# Patient Record
Sex: FEMALE | Race: WHITE | NOT HISPANIC OR LATINO | Employment: UNEMPLOYED | URBAN - METROPOLITAN AREA
[De-identification: names, ages, dates, MRNs, and addresses within clinical notes are randomized per-mention and may not be internally consistent; named-entity substitution may affect disease eponyms.]

---

## 2017-01-09 ENCOUNTER — TELEPHONE (OUTPATIENT)
Dept: OBSTETRICS AND GYNECOLOGY | Facility: CLINIC | Age: 37
End: 2017-01-09

## 2017-01-09 NOTE — TELEPHONE ENCOUNTER
Post op laparoscopic hysterectomy 11/30/16 would like to know when she can resume intercourse. Patient scheduled for post op visit 1/18/17. Please advise.

## 2017-01-09 NOTE — TELEPHONE ENCOUNTER
----- Message from Kathy Hanna sent at 2017  2:18 PM CST -----  Contact: self  Anat Parks  MRN: [id number]  : 1980  PCP: Gracy Zimmer  Home Phone      [phone]  Work Phone      Not on file.  Mobile          [phone]      MESSAGE: Pt has a question in regards to her surgery. Pt does not wish to give me further information. Pt states that she only wants to give information to a nurse. Please call @ [phone].  Thanks!

## 2017-01-09 NOTE — TELEPHONE ENCOUNTER
We usually recommend the patient's weight a full 6 weeks.  for her this would be January 11.  Patient should be safe to resume intercourse

## 2017-02-13 ENCOUNTER — TELEPHONE (OUTPATIENT)
Dept: INTERNAL MEDICINE | Facility: CLINIC | Age: 37
End: 2017-02-13

## 2017-02-13 ENCOUNTER — OFFICE VISIT (OUTPATIENT)
Dept: FAMILY MEDICINE | Facility: CLINIC | Age: 37
End: 2017-02-13
Payer: OTHER GOVERNMENT

## 2017-02-13 ENCOUNTER — APPOINTMENT (OUTPATIENT)
Dept: RADIOLOGY | Facility: CLINIC | Age: 37
End: 2017-02-13
Attending: NURSE PRACTITIONER
Payer: OTHER GOVERNMENT

## 2017-02-13 VITALS
HEART RATE: 72 BPM | DIASTOLIC BLOOD PRESSURE: 90 MMHG | SYSTOLIC BLOOD PRESSURE: 138 MMHG | WEIGHT: 247.38 LBS | BODY MASS INDEX: 37.49 KG/M2 | HEIGHT: 68 IN | RESPIRATION RATE: 15 BRPM

## 2017-02-13 DIAGNOSIS — M54.42 ACUTE BILATERAL LOW BACK PAIN WITH BILATERAL SCIATICA: Primary | ICD-10-CM

## 2017-02-13 DIAGNOSIS — M54.41 ACUTE BILATERAL LOW BACK PAIN WITH BILATERAL SCIATICA: Primary | ICD-10-CM

## 2017-02-13 DIAGNOSIS — R82.90 ABNORMAL URINALYSIS: ICD-10-CM

## 2017-02-13 DIAGNOSIS — M54.41 ACUTE BILATERAL LOW BACK PAIN WITH BILATERAL SCIATICA: ICD-10-CM

## 2017-02-13 DIAGNOSIS — M54.42 ACUTE BILATERAL LOW BACK PAIN WITH BILATERAL SCIATICA: ICD-10-CM

## 2017-02-13 PROCEDURE — 87186 SC STD MICRODIL/AGAR DIL: CPT

## 2017-02-13 PROCEDURE — 87088 URINE BACTERIA CULTURE: CPT

## 2017-02-13 PROCEDURE — 87086 URINE CULTURE/COLONY COUNT: CPT

## 2017-02-13 PROCEDURE — 99999 PR PBB SHADOW E&M-EST. PATIENT-LVL IV: CPT | Mod: PBBFAC,,, | Performed by: NURSE PRACTITIONER

## 2017-02-13 PROCEDURE — 72100 X-RAY EXAM L-S SPINE 2/3 VWS: CPT | Mod: TC,PO

## 2017-02-13 PROCEDURE — 99214 OFFICE O/P EST MOD 30 MIN: CPT | Mod: S$PBB,,, | Performed by: NURSE PRACTITIONER

## 2017-02-13 PROCEDURE — 87077 CULTURE AEROBIC IDENTIFY: CPT

## 2017-02-13 PROCEDURE — 96372 THER/PROPH/DIAG INJ SC/IM: CPT | Mod: PBBFAC

## 2017-02-13 PROCEDURE — 81001 URINALYSIS AUTO W/SCOPE: CPT | Mod: PBBFAC | Performed by: NURSE PRACTITIONER

## 2017-02-13 PROCEDURE — 72100 X-RAY EXAM L-S SPINE 2/3 VWS: CPT | Mod: 26,,, | Performed by: RADIOLOGY

## 2017-02-13 PROCEDURE — 81000 URINALYSIS NONAUTO W/SCOPE: CPT | Mod: PBBFAC | Performed by: NURSE PRACTITIONER

## 2017-02-13 PROCEDURE — 99214 OFFICE O/P EST MOD 30 MIN: CPT | Mod: PBBFAC,25 | Performed by: NURSE PRACTITIONER

## 2017-02-13 RX ORDER — NITROFURANTOIN 25; 75 MG/1; MG/1
100 CAPSULE ORAL 2 TIMES DAILY
Qty: 20 CAPSULE | Refills: 0 | Status: SHIPPED | OUTPATIENT
Start: 2017-02-13 | End: 2017-02-23

## 2017-02-13 RX ORDER — METHYLPREDNISOLONE ACETATE 40 MG/ML
60 INJECTION, SUSPENSION INTRA-ARTICULAR; INTRALESIONAL; INTRAMUSCULAR; SOFT TISSUE
Status: COMPLETED | OUTPATIENT
Start: 2017-02-13 | End: 2017-02-13

## 2017-02-13 RX ORDER — CYCLOBENZAPRINE HCL 5 MG
5 TABLET ORAL NIGHTLY PRN
Qty: 30 TABLET | Refills: 1 | Status: SHIPPED | OUTPATIENT
Start: 2017-02-13 | End: 2017-05-01

## 2017-02-13 RX ORDER — TRAMADOL HYDROCHLORIDE AND ACETAMINOPHEN 37.5; 325 MG/1; MG/1
1 TABLET, FILM COATED ORAL EVERY 6 HOURS PRN
Qty: 60 TABLET | Refills: 0 | Status: SHIPPED | OUTPATIENT
Start: 2017-02-13 | End: 2017-03-08 | Stop reason: SDUPTHER

## 2017-02-13 RX ORDER — KETOROLAC TROMETHAMINE 30 MG/ML
60 INJECTION, SOLUTION INTRAMUSCULAR; INTRAVENOUS
Status: COMPLETED | OUTPATIENT
Start: 2017-02-13 | End: 2017-02-13

## 2017-02-13 RX ADMIN — KETOROLAC TROMETHAMINE 60 MG: 60 INJECTION, SOLUTION INTRAMUSCULAR at 05:02

## 2017-02-13 RX ADMIN — METHYLPREDNISOLONE ACETATE 60 MG: 40 INJECTION, SUSPENSION INTRA-ARTICULAR; INTRALESIONAL; INTRAMUSCULAR; SOFT TISSUE at 05:02

## 2017-02-13 NOTE — TELEPHONE ENCOUNTER
Patient reports hx of back injury. Reports that she woke this morning with numbness to feet. She is requesting an appt for today. Only availability today is with BUTCH Shannon NP @ [facility] @ 3:00 pm

## 2017-02-13 NOTE — TELEPHONE ENCOUNTER
----- Message from Jody Purcell sent at 2017  1:09 PM CST -----  Contact: self  Anat Parks  MRN: [id number]  : 1980  PCP: Gracy Zimmer  Home Phone      [phone]  Work Phone      Not on file.  Mobile          [phone]      MESSAGE:   Pt said she threw out her back. She is requesting to be seen today.    Phone: [phone]

## 2017-02-13 NOTE — PROGRESS NOTES
Subjective:       Patient ID: Anat Parks is a 36 y.o. female.    Chief Complaint: Back Pain (2 weeks ); Leg Pain (2 weeks); and Foot Pain    HPI Comments: Back pain radiating to both buttocks, legs and feet.    Back Pain   Episode onset: bck pain started about 2 years ago and has been worse over the last 2 weeks. The quality of the pain is described as burning. Radiates to: anuel buttocks, legs and foot numbness. The pain is at a severity of 6/10. The pain is worse during the night. The symptoms are aggravated by standing. Associated symptoms include leg pain.   Leg Pain      Foot Pain       Review of Systems   Constitutional: Negative.    HENT: Negative.    Eyes: Negative.    Respiratory: Negative.    Cardiovascular: Negative.    Gastrointestinal: Negative.    Genitourinary: Negative.    Musculoskeletal: Positive for back pain (as per HPI).   Skin: Negative.    Neurological: Negative.    Psychiatric/Behavioral: Negative.    All other systems reviewed and are negative.      Objective:      Physical Exam   Constitutional: She is oriented to person, place, and time. She appears well-developed and well-nourished. No distress.   HENT:   Head: Normocephalic and atraumatic.   Eyes: Pupils are equal, round, and reactive to light.   Neck: Normal range of motion. Neck supple.   Cardiovascular: Normal rate and regular rhythm.    No murmur heard.  Pulmonary/Chest: Effort normal and breath sounds normal. No respiratory distress.   Neurological: She is alert and oriented to person, place, and time.   Skin: Skin is warm and dry.   Psychiatric: She has a normal mood and affect.   Nursing note and vitals reviewed.      Assessment:       1. Acute bilateral low back pain with bilateral sciatica    2. Abnormal urinalysis        Plan:   Anat was seen today for back pain, leg pain and foot pain.    Diagnoses and all orders for this visit:    Acute bilateral low back pain with bilateral sciatica  -     POCT urinalysis, dipstick or  tablet reag  -     POCT URINE SEDIMENT EXAM  -     X-Ray Lumbar Spine Ap And Lateral; Future  -     methylPREDNISolone acetate injection 60 mg; Inject 1.5 mLs (60 mg total) into the muscle one time.  -     ketorolac injection 60 mg; Inject 2 mLs (60 mg total) into the muscle one time.  -     cyclobenzaprine (FLEXERIL) 5 MG tablet; Take 1 tablet (5 mg total) by mouth nightly as needed for Muscle spasms.  -     tramadol-acetaminophen 37.5-325 mg (ULTRACET) 37.5-325 mg Tab; Take 1 tablet by mouth every 6 (six) hours as needed for Pain.    Abnormal urinalysis  -     Urine culture  -     nitrofurantoin, macrocrystal-monohydrate, (MACROBID) 100 MG capsule; Take 1 capsule (100 mg total) by mouth 2 (two) times daily.    RTC 2 weeks for recheck

## 2017-02-13 NOTE — MR AVS SNAPSHOT
[address] [address]  Phone: [phone]  Fax: [phone]                  Anat Parks   2017 3:00 PM   Office Visit    Description:  Female : 1980   Provider:  Domonique Shannon NP   Department:  [facility]           Reason for Visit     Back Pain     Leg Pain     Foot Pain           Diagnoses this Visit        Comments    Acute bilateral low back pain with bilateral sciatica    -  Primary     Abnormal urinalysis                To Do List           Future Appointments        Provider Department Dept Phone    3/1/2017 8:15 AM Domonique Shannon NP [facility] [phone]    [phone]    2017 8:00 AM Mary Dickinson NP [facility] [phone]      Goals (5 Years of Data)     None      Follow-Up and Disposition     Return in about 2 weeks (around 2017).       These Medications        Disp Refills Start End    nitrofurantoin, macrocrystal-monohydrate, (MACROBID) 100 MG capsule 20 capsule 0 2017    Take 1 capsule (100 mg total) by mouth 2 (two) times daily. - Oral    Pharmacy: [facility] [address] [place] [address] Ph #: [phone]       cyclobenzaprine (FLEXERIL) 5 MG tablet 30 tablet 1 2017     Take 1 tablet (5 mg total) by mouth nightly as needed for Muscle spasms. - Oral    Pharmacy: [facility] [address] [place] [address] Ph #: [phone]       tramadol-acetaminophen 37.5-325 mg (ULTRACET) 37.5-325 mg Tab 60 tablet 0 2017     Take 1 tablet by mouth every 6 (six) hours as needed for Pain. - Oral    Pharmacy: [facility] [address] [place] [address] Ph #: [phone]         [facility]     [facility] -  Assistance  Registered nurses in the [facility] provide clinical advisement, health education,  appointment booking, and other advisory services.  Call for this free service at [phone].             Medications           Message regarding Medications     Verify the changes and/or additions to your medication regime listed below are the same as discussed with your clinician today.  If any of these changes or additions are incorrect, please notify your healthcare provider.        START taking these NEW medications        Refills    nitrofurantoin, macrocrystal-monohydrate, (MACROBID) 100 MG capsule 0    Sig: Take 1 capsule (100 mg total) by mouth 2 (two) times daily.    Class: Normal    Route: Oral    cyclobenzaprine (FLEXERIL) 5 MG tablet 1    Sig: Take 1 tablet (5 mg total) by mouth nightly as needed for Muscle spasms.    Class: Normal    Route: Oral    tramadol-acetaminophen 37.5-325 mg (ULTRACET) 37.5-325 mg Tab 0    Sig: Take 1 tablet by mouth every 6 (six) hours as needed for Pain.    Class: Normal    Route: Oral      These medications were administered today        Dose Freq    methylPREDNISolone acetate injection 60 mg 60 mg Clinic/HOD 1 time    Sig: Inject 1.5 mLs (60 mg total) into the muscle one time.    Class: Normal    Route: Intramuscular    ketorolac injection 60 mg 60 mg Clinic/HOD 1 time    Sig: Inject 2 mLs (60 mg total) into the muscle one time.    Class: Normal    Route: Intramuscular      STOP taking these medications     hydrocodone-acetaminophen 5-325mg (NORCO) 5-325 mg per tablet Take 1 tablet by mouth every 4 (four) hours as needed for Pain.           Verify that the below list of medications is an accurate representation of the medications you are currently taking.  If none reported, the list may be blank. If incorrect, please contact your healthcare provider. Carry this list with you in case of emergency.           Current Medications     buPROPion (WELLBUTRIN XL) 300 MG 24 hr tablet Take 1 tablet (300 mg total) by mouth once daily.    CAFFEINE ORAL Take 1 tablet by mouth daily  "as needed.    cyclobenzaprine (FLEXERIL) 5 MG tablet Take 1 tablet (5 mg total) by mouth nightly as needed for Muscle spasms.    diphenhydrAMINE (BENADRYL) 25 mg capsule Take 25 mg by mouth every 6 (six) hours as needed for Itching.    eszopiclone 3 mg Tab Take 1 tablet (3 mg total) by mouth nightly as needed.    ibuprofen (ADVIL,MOTRIN) 800 MG tablet Take 1 tablet (800 mg total) by mouth every 8 (eight) hours as needed for Pain (cramping).    nitrofurantoin, macrocrystal-monohydrate, (MACROBID) 100 MG capsule Take 1 capsule (100 mg total) by mouth 2 (two) times daily.    omeprazole (PRILOSEC) 10 MG capsule Take 1 capsule (10 mg total) by mouth once daily.    promethazine (PHENERGAN) 12.5 MG Tab Take 1 tablet (12.5 mg total) by mouth every 6 (six) hours as needed (nausea/vomiting).    tramadol-acetaminophen 37.5-325 mg (ULTRACET) 37.5-325 mg Tab Take 1 tablet by mouth every 6 (six) hours as needed for Pain.           Clinical Reference Information           Your Vitals Were     BP Pulse Resp Height Weight Last Period    138/90 (BP Location: Left arm, Patient Position: Sitting, BP Method: Manual) 72 15 5' 8" (1.727 m) 112.2 kg (247 lb 5.7 oz) 11/16/2016 (Exact Date)    BMI                37.61 kg/m2          Blood Pressure          Most Recent Value    BP  (!)  138/90      Allergies as of 2/13/2017     No Known Allergies      Immunizations Administered on Date of Encounter - 2/13/2017     None      Orders Placed During Today's Visit      Normal Orders This Visit    POCT urinalysis, dipstick or tablet reag     POCT URINE SEDIMENT EXAM     Urine culture     Future Labs/Procedures Expected by Expires    X-Ray Lumbar Spine Ap And Lateral  2/13/2017 2/13/2018      Language Assistance Services     ATTENTION: Language assistance services are available, free of charge. Please call [phone].      ATENCIÓN: Si habla español, tiene a morocho disposición servicios gratuitos de asistencia lingüística. Llame al " [phone].     YOUSIF Ý: N?u b?n nói Ti?ng Vi?t, có các d?ch v? h? tr? ngôn ng? mi?n phí dành cho b?n. G?i s? [phone].         [facility] complies with applicable Federal civil rights laws and does not discriminate on the basis of race, color, national origin, age, disability, or sex.

## 2017-02-14 LAB
BACTERIA SPEC CULT: ABNORMAL
BILIRUB SERPL-MCNC: NORMAL MG/DL
BLOOD URINE, POC: NORMAL
CASTS: ABNORMAL
COLOR, POC UA: NORMAL
CRYSTALS: ABNORMAL
GLUCOSE UR QL STRIP: NORMAL
KETONES UR QL STRIP: NORMAL
LEUKOCYTE ESTERASE URINE, POC: POSITIVE
NITRITE, POC UA: NORMAL
PH, POC UA: 5
PROTEIN, POC: NORMAL
RBC CELLS COUNTED: ABNORMAL
SPECIFIC GRAVITY, POC UA: 1.01
UROBILINOGEN, POC UA: NORMAL
WHITE BLOOD CELLS: ABNORMAL

## 2017-02-16 ENCOUNTER — PATIENT MESSAGE (OUTPATIENT)
Dept: FAMILY MEDICINE | Facility: CLINIC | Age: 37
End: 2017-02-16

## 2017-02-16 DIAGNOSIS — N39.0 URINARY TRACT INFECTION WITHOUT HEMATURIA, SITE UNSPECIFIED: Primary | ICD-10-CM

## 2017-02-16 LAB — BACTERIA UR CULT: NORMAL

## 2017-02-16 RX ORDER — SULFAMETHOXAZOLE AND TRIMETHOPRIM 800; 160 MG/1; MG/1
1 TABLET ORAL 2 TIMES DAILY
Qty: 20 TABLET | Refills: 0 | Status: SHIPPED | OUTPATIENT
Start: 2017-02-16 | End: 2017-02-26

## 2017-03-08 ENCOUNTER — OFFICE VISIT (OUTPATIENT)
Dept: INTERNAL MEDICINE | Facility: CLINIC | Age: 37
End: 2017-03-08
Payer: OTHER GOVERNMENT

## 2017-03-08 VITALS
DIASTOLIC BLOOD PRESSURE: 82 MMHG | HEART RATE: 96 BPM | SYSTOLIC BLOOD PRESSURE: 122 MMHG | WEIGHT: 239.44 LBS | BODY MASS INDEX: 36.29 KG/M2 | HEIGHT: 68 IN

## 2017-03-08 DIAGNOSIS — M54.42 ACUTE BILATERAL LOW BACK PAIN WITH BILATERAL SCIATICA: ICD-10-CM

## 2017-03-08 DIAGNOSIS — M51.36 BULGE OF LUMBAR DISC WITHOUT MYELOPATHY: Primary | ICD-10-CM

## 2017-03-08 DIAGNOSIS — F51.04 PSYCHOPHYSIOLOGICAL INSOMNIA: ICD-10-CM

## 2017-03-08 DIAGNOSIS — G89.29 CHRONIC MIDLINE LOW BACK PAIN WITHOUT SCIATICA: ICD-10-CM

## 2017-03-08 DIAGNOSIS — M54.50 CHRONIC MIDLINE LOW BACK PAIN WITHOUT SCIATICA: ICD-10-CM

## 2017-03-08 DIAGNOSIS — M54.41 ACUTE BILATERAL LOW BACK PAIN WITH BILATERAL SCIATICA: ICD-10-CM

## 2017-03-08 PROCEDURE — 99999 PR PBB SHADOW E&M-EST. PATIENT-LVL III: CPT | Mod: PBBFAC,,, | Performed by: NURSE PRACTITIONER

## 2017-03-08 PROCEDURE — 99213 OFFICE O/P EST LOW 20 MIN: CPT | Mod: PBBFAC | Performed by: NURSE PRACTITIONER

## 2017-03-08 PROCEDURE — 96372 THER/PROPH/DIAG INJ SC/IM: CPT | Mod: PBBFAC

## 2017-03-08 PROCEDURE — 99214 OFFICE O/P EST MOD 30 MIN: CPT | Mod: S$PBB,,, | Performed by: NURSE PRACTITIONER

## 2017-03-08 RX ORDER — ESZOPICLONE 3 MG/1
3 TABLET, FILM COATED ORAL NIGHTLY PRN
Qty: 90 TABLET | Refills: 1 | Status: SHIPPED | OUTPATIENT
Start: 2017-03-08 | End: 2018-02-07 | Stop reason: SDUPTHER

## 2017-03-08 RX ORDER — METHYLPREDNISOLONE ACETATE 80 MG/ML
80 INJECTION, SUSPENSION INTRA-ARTICULAR; INTRALESIONAL; INTRAMUSCULAR; SOFT TISSUE
Status: COMPLETED | OUTPATIENT
Start: 2017-03-08 | End: 2017-03-08

## 2017-03-08 RX ORDER — DICYCLOMINE HYDROCHLORIDE 20 MG/1
20 TABLET ORAL EVERY 6 HOURS
COMMUNITY
End: 2017-03-28 | Stop reason: SDUPTHER

## 2017-03-08 RX ORDER — TRAMADOL HYDROCHLORIDE AND ACETAMINOPHEN 37.5; 325 MG/1; MG/1
1 TABLET, FILM COATED ORAL EVERY 6 HOURS PRN
Qty: 60 TABLET | Refills: 0 | Status: SHIPPED | OUTPATIENT
Start: 2017-03-08 | End: 2017-05-01

## 2017-03-08 RX ADMIN — METHYLPREDNISOLONE ACETATE 80 MG: 80 INJECTION, SUSPENSION INTRA-ARTICULAR; INTRALESIONAL; INTRAMUSCULAR; SOFT TISSUE at 04:03

## 2017-03-08 NOTE — PROGRESS NOTES
Subjective:       Patient ID: Anat Parks is a 37 y.o. female.    Chief Complaint: Back Pain    HPI: Pt presents to clinic today new to me but known to Ludmila with c/o back pain. She reports that she saw Ludmila at the end of Feb and was imaged. Her x ray was normal. She reports that she has1 week off and still just not better. Usin TENS unit and heat with relief but pain does not remit. She has been doing PT stretching with no resolve. She has tried tramadol. Did not take flexeril due to her lunesta use for sleep and was afraid to take it all together.  She did have a CT abd and it showed Age-appropriate degenerative changes affect the skeleton.  There is a large broad-based posterior disk bulge at L4-5 that results in mild central canal stenosis or neural foraminal narrowing. 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 Negative for chills and fever.   Respiratory: Negative for cough, chest tightness and shortness of breath.    Cardiovascular: Negative for chest pain, palpitations and leg swelling.   Gastrointestinal: Negative for abdominal pain, constipation, diarrhea, nausea and vomiting.   Genitourinary: Negative for difficulty urinating, flank pain, frequency, hematuria and urgency.   Musculoskeletal: Positive for back pain.        Pain in lower back   Skin: Negative for rash and wound.   Neurological: Negative for dizziness, weakness, numbness and headaches.       Objective:      Physical Exam   Constitutional: She is oriented to person, place, and time. She appears well-developed and well-nourished.   HENT:   Head: Normocephalic and atraumatic.   Eyes: Pupils are equal, round, and reactive to light.   Neck: Normal range of motion. Neck supple.   Cardiovascular: Normal rate, regular rhythm, normal heart sounds and intact distal pulses.    No murmur  heard.  Pulmonary/Chest: Effort normal and breath sounds normal. No respiratory distress. She has no wheezes. She has no rales.   Abdominal: Soft. Bowel sounds are normal.   Musculoskeletal: She exhibits tenderness.   Lower back pain  Turning right and left with feet posted causes pain to flank areas. With cervical compression she c/o pain in sacral area   Neurological: She is alert and oriented to person, place, and time. She has normal reflexes.   Skin: Skin is warm and dry.   Psychiatric: She has a normal mood and affect.   Nursing note and vitals reviewed.      Assessment:       1. Bulge of lumbar disc without myelopathy    2. Chronic midline low back pain without sciatica    3. Acute bilateral low back pain with bilateral sciatica    4. Psychophysiological insomnia        Plan:   Anat was seen today for back pain.    Diagnoses and all orders for this visit:    Bulge of lumbar disc without myelopathy  -     MRI Lumbar Spine Without Contrast; Future    Chronic midline low back pain without sciatica  -     MRI Lumbar Spine Without Contrast; Future  -     methylPREDNISolone acetate injection 80 mg; Inject 1 mL (80 mg total) into the muscle one time.    Acute bilateral low back pain with bilateral sciatica  -     tramadol-acetaminophen 37.5-325 mg (ULTRACET) 37.5-325 mg Tab; Take 1 tablet by mouth every 6 (six) hours as needed for Pain.    Psychophysiological insomnia  -     eszopiclone 3 mg Tab; Take 1 tablet (3 mg total) by mouth nightly as needed.

## 2017-03-13 ENCOUNTER — HOSPITAL ENCOUNTER (OUTPATIENT)
Dept: RADIOLOGY | Facility: HOSPITAL | Age: 37
Discharge: HOME | End: 2017-03-13
Attending: NURSE PRACTITIONER
Payer: OTHER GOVERNMENT

## 2017-03-13 DIAGNOSIS — M51.36 BULGE OF LUMBAR DISC WITHOUT MYELOPATHY: ICD-10-CM

## 2017-03-13 DIAGNOSIS — M54.50 CHRONIC MIDLINE LOW BACK PAIN WITHOUT SCIATICA: ICD-10-CM

## 2017-03-13 DIAGNOSIS — G89.29 CHRONIC MIDLINE LOW BACK PAIN WITHOUT SCIATICA: ICD-10-CM

## 2017-03-13 PROCEDURE — 72148 MRI LUMBAR SPINE W/O DYE: CPT | Mod: TC

## 2017-03-13 PROCEDURE — 72148 MRI LUMBAR SPINE W/O DYE: CPT | Mod: 26,,, | Performed by: RADIOLOGY

## 2017-03-14 DIAGNOSIS — G89.29 CHRONIC MIDLINE LOW BACK PAIN WITHOUT SCIATICA: Primary | ICD-10-CM

## 2017-03-14 DIAGNOSIS — M54.50 CHRONIC MIDLINE LOW BACK PAIN WITHOUT SCIATICA: Primary | ICD-10-CM

## 2017-03-15 ENCOUNTER — TELEPHONE (OUTPATIENT)
Dept: FAMILY MEDICINE | Facility: CLINIC | Age: 37
End: 2017-03-15

## 2017-03-15 DIAGNOSIS — M54.50 ACUTE MIDLINE LOW BACK PAIN WITHOUT SCIATICA: Primary | ICD-10-CM

## 2017-03-21 ENCOUNTER — TELEPHONE (OUTPATIENT)
Dept: INTERNAL MEDICINE | Facility: CLINIC | Age: 37
End: 2017-03-21

## 2017-03-21 DIAGNOSIS — M54.9 BACK PAIN, UNSPECIFIED BACK LOCATION, UNSPECIFIED BACK PAIN LATERALITY, UNSPECIFIED CHRONICITY: Primary | ICD-10-CM

## 2017-03-21 NOTE — TELEPHONE ENCOUNTER
Can you please place a referral to Dr. Reji Arreguin  Physical medicine & rehabilitation - pain medicine   [address], [place] [address]   [phone]    Neurosurgery reviewed her case and determined that surgery is not indicated at this time. They referred her out to Dr Arreguin. She is scheduled for 3/28/17.

## 2017-03-24 ENCOUNTER — TELEPHONE (OUTPATIENT)
Dept: INTERNAL MEDICINE | Facility: CLINIC | Age: 37
End: 2017-03-24

## 2017-03-24 NOTE — TELEPHONE ENCOUNTER
----- Message from Jody Purcell sent at 3/24/2017  8:48 AM CDT -----  Contact: Karen / Dr. Arreguin  Anat Parks  MRN: [id number]  : 1980  PCP: Gracy Zimmer  Home Phone      [phone]  Work Phone      Not on file.  Mobile          [phone]      MESSAGE:   Calling because pt was referred to see Dr. Arreguin. She said that they have called multiple times to get this taken care of. Pt has  prime insurance which requires referrals to be submitted electronically. The pt is scheduled to go in on Tuesday. They need to have this done before then.    Phone: [phone]  Fax: [phone]

## 2017-03-24 NOTE — TELEPHONE ENCOUNTER
----- Message from Jody Purcell sent at 3/24/2017  8:48 AM CDT -----  Contact: Karen / Dr. Arreguin  Anat Parks  MRN: [id number]  : 1980  PCP: Gracy Zimmer  Home Phone      [phone]  Work Phone      Not on file.  Mobile          [phone]      MESSAGE:   Calling because pt was referred to see Dr. Arreguin. She said that they have called multiple times to get this taken care of. Pt has  prime insurance which requires referrals to be submitted electronically. The pt is scheduled to go in on Tuesday. They need to have this done before then.    Phone: [phone]  Fax: [phone]

## 2017-03-24 NOTE — TELEPHONE ENCOUNTER
I have requested assistance from Megan in auth dept to obtain this authorization. Will contact Dr. Arreguin's office once complete.

## 2017-03-28 DIAGNOSIS — K58.2 IRRITABLE BOWEL SYNDROME WITH BOTH CONSTIPATION AND DIARRHEA: ICD-10-CM

## 2017-03-28 RX ORDER — DICYCLOMINE HYDROCHLORIDE 20 MG/1
TABLET ORAL
Qty: 360 TABLET | Refills: 0 | Status: SHIPPED | OUTPATIENT
Start: 2017-03-28 | End: 2017-06-26 | Stop reason: SDUPTHER

## 2017-03-31 DIAGNOSIS — K29.70 GASTRITIS: ICD-10-CM

## 2017-03-31 RX ORDER — OMEPRAZOLE 10 MG/1
CAPSULE, DELAYED RELEASE ORAL
Qty: 90 CAPSULE | Refills: 0 | Status: SHIPPED | OUTPATIENT
Start: 2017-03-31 | End: 2017-06-29 | Stop reason: SDUPTHER

## 2017-04-04 ENCOUNTER — TELEPHONE (OUTPATIENT)
Dept: INTERNAL MEDICINE | Facility: CLINIC | Age: 37
End: 2017-04-04

## 2017-04-04 DIAGNOSIS — G89.29 CHRONIC LOW BACK PAIN, UNSPECIFIED BACK PAIN LATERALITY, WITH SCIATICA PRESENCE UNSPECIFIED: Primary | ICD-10-CM

## 2017-04-04 DIAGNOSIS — M54.5 CHRONIC LOW BACK PAIN, UNSPECIFIED BACK PAIN LATERALITY, WITH SCIATICA PRESENCE UNSPECIFIED: Primary | ICD-10-CM

## 2017-04-04 NOTE — TELEPHONE ENCOUNTER
----- Message from Norma Moon sent at 2017 11:25 AM CDT -----  Contact: self  Anat Parks  MRN: [id number]  : 1980  PCP: Gracy Zimmer  Home Phone      [phone]  Work Phone      Not on file.  Mobile          [phone]      MESSAGE: need referral to different pain dr want to see dr osman in [facility]-----[phone]  Dr elizondo can not sent scrip to ins co

## 2017-04-19 ENCOUNTER — TELEPHONE (OUTPATIENT)
Dept: INTERNAL MEDICINE | Facility: CLINIC | Age: 37
End: 2017-04-19

## 2017-04-19 NOTE — TELEPHONE ENCOUNTER
----- Message from Norma Moon sent at 2017 11:59 AM CDT -----  Contact: self  Anat Parks  MRN: [id number]  : 1980  PCP: Gracy Zimmer  Home Phone      [phone]  Work Phone      Not on file.  [employer]          [phone]      MESSAGE: checking on referral on pain management-------[phone]

## 2017-04-27 ENCOUNTER — PATIENT MESSAGE (OUTPATIENT)
Dept: INTERNAL MEDICINE | Facility: CLINIC | Age: 37
End: 2017-04-27

## 2017-04-27 ENCOUNTER — TELEPHONE (OUTPATIENT)
Dept: FAMILY MEDICINE | Facility: CLINIC | Age: 37
End: 2017-04-27

## 2017-04-27 ENCOUNTER — TELEPHONE (OUTPATIENT)
Dept: INTERNAL MEDICINE | Facility: CLINIC | Age: 37
End: 2017-04-27

## 2017-04-27 DIAGNOSIS — Z00.00 PREVENTATIVE HEALTH CARE: Primary | ICD-10-CM

## 2017-04-27 NOTE — TELEPHONE ENCOUNTER
Spoke with pt about referral spoke with Pao Montoya for approval authorization pending will check again latter

## 2017-04-27 NOTE — TELEPHONE ENCOUNTER
----- Message from Jody Purcell sent at 2017  8:12 AM CDT -----  Contact: self  Anat Parks  MRN: [id number]  : 1980  PCP: Gracy Zimmer  Home Phone      [phone]  Work Phone      Not on file.  Mobile          [phone]      MESSAGE:   Pt would like to speak with the nurse regarding a referral for pain management. She said that she has been waiting for a referral for over a month now.    Phone: [phone]

## 2017-04-27 NOTE — TELEPHONE ENCOUNTER
----- Message from [facility] sent at 2017  8:18 AM CDT -----  Contact: self  Anat Parks  MRN: [id number]  : 1980  PCP: Gracy Zimmer  Home Phone      [phone]  Work Phone      Not on file.  Mobile          [phone]      MESSAGE: requesting to speak with cyndi regarding her  referral, says she normally sees codi and tess but she has delt with cyndi before with  referrals and knows he knows how to do them correctly. Please call to discuss.    Phone: [phone]

## 2017-04-28 ENCOUNTER — LAB VISIT (OUTPATIENT)
Dept: LAB | Facility: HOSPITAL | Age: 37
End: 2017-04-28
Attending: INTERNAL MEDICINE
Payer: OTHER GOVERNMENT

## 2017-04-28 DIAGNOSIS — K58.9 IRRITABLE BOWEL SYNDROME WITHOUT DIARRHEA: ICD-10-CM

## 2017-04-28 DIAGNOSIS — Z00.00 WELLNESS EXAMINATION: ICD-10-CM

## 2017-04-28 DIAGNOSIS — Z80.3 FH: BREAST CANCER: ICD-10-CM

## 2017-04-28 DIAGNOSIS — K29.70 GASTRITIS: ICD-10-CM

## 2017-04-28 LAB
ALBUMIN SERPL BCP-MCNC: 3.6 G/DL
ALP SERPL-CCNC: 56 U/L
ALT SERPL W/O P-5'-P-CCNC: 27 U/L
ANION GAP SERPL CALC-SCNC: 8 MMOL/L
AST SERPL-CCNC: 16 U/L
BASOPHILS # BLD AUTO: 0.02 K/UL
BASOPHILS NFR BLD: 0.3 %
BILIRUB SERPL-MCNC: 0.4 MG/DL
BUN SERPL-MCNC: 14 MG/DL
CALCIUM SERPL-MCNC: 9 MG/DL
CHLORIDE SERPL-SCNC: 105 MMOL/L
CHOLEST/HDLC SERPL: 3.2 {RATIO}
CO2 SERPL-SCNC: 26 MMOL/L
CREAT SERPL-MCNC: 0.8 MG/DL
DIFFERENTIAL METHOD: ABNORMAL
EOSINOPHIL # BLD AUTO: 0.1 K/UL
EOSINOPHIL NFR BLD: 1.2 %
ERYTHROCYTE [DISTWIDTH] IN BLOOD BY AUTOMATED COUNT: 15 %
EST. GFR  (AFRICAN AMERICAN): >60 ML/MIN/1.73 M^2
EST. GFR  (NON AFRICAN AMERICAN): >60 ML/MIN/1.73 M^2
GLUCOSE SERPL-MCNC: 92 MG/DL
HCT VFR BLD AUTO: 38 %
HDL/CHOLESTEROL RATIO: 31.1 %
HDLC SERPL-MCNC: 161 MG/DL
HDLC SERPL-MCNC: 50 MG/DL
HGB BLD-MCNC: 12.3 G/DL
LDLC SERPL CALC-MCNC: 90.2 MG/DL
LYMPHOCYTES # BLD AUTO: 2.4 K/UL
LYMPHOCYTES NFR BLD: 33.2 %
MCH RBC QN AUTO: 26.5 PG
MCHC RBC AUTO-ENTMCNC: 32.4 %
MCV RBC AUTO: 82 FL
MONOCYTES # BLD AUTO: 0.6 K/UL
MONOCYTES NFR BLD: 7.8 %
NEUTROPHILS # BLD AUTO: 4.2 K/UL
NEUTROPHILS NFR BLD: 57.5 %
NONHDLC SERPL-MCNC: 111 MG/DL
PLATELET # BLD AUTO: 279 K/UL
PMV BLD AUTO: 10 FL
POTASSIUM SERPL-SCNC: 4.3 MMOL/L
PROT SERPL-MCNC: 6.8 G/DL
RBC # BLD AUTO: 4.65 M/UL
SODIUM SERPL-SCNC: 139 MMOL/L
TRIGL SERPL-MCNC: 104 MG/DL
TSH SERPL DL<=0.005 MIU/L-ACNC: 2.87 UIU/ML
WBC # BLD AUTO: 7.33 K/UL

## 2017-04-28 PROCEDURE — 36415 COLL VENOUS BLD VENIPUNCTURE: CPT

## 2017-04-28 PROCEDURE — 80061 LIPID PANEL: CPT

## 2017-04-28 PROCEDURE — 80053 COMPREHEN METABOLIC PANEL: CPT

## 2017-04-28 PROCEDURE — 84443 ASSAY THYROID STIM HORMONE: CPT

## 2017-04-28 PROCEDURE — 85025 COMPLETE CBC W/AUTO DIFF WBC: CPT

## 2017-04-28 NOTE — TELEPHONE ENCOUNTER
i see that juma emailed her saying the approval for Dr. Quintero ([facility]) was done.

## 2017-05-01 ENCOUNTER — OFFICE VISIT (OUTPATIENT)
Dept: INTERNAL MEDICINE | Facility: CLINIC | Age: 37
End: 2017-05-01
Payer: OTHER GOVERNMENT

## 2017-05-01 VITALS
HEART RATE: 80 BPM | WEIGHT: 247.13 LBS | RESPIRATION RATE: 18 BRPM | SYSTOLIC BLOOD PRESSURE: 110 MMHG | DIASTOLIC BLOOD PRESSURE: 80 MMHG | HEIGHT: 68 IN | BODY MASS INDEX: 37.46 KG/M2

## 2017-05-01 DIAGNOSIS — R41.840 INATTENTION: ICD-10-CM

## 2017-05-01 DIAGNOSIS — Z13.29 SCREENING FOR THYROID DISORDER: ICD-10-CM

## 2017-05-01 DIAGNOSIS — Z13.6 SCREENING FOR CARDIOVASCULAR CONDITION: ICD-10-CM

## 2017-05-01 DIAGNOSIS — Z09 FOLLOW UP: Primary | ICD-10-CM

## 2017-05-01 DIAGNOSIS — K58.1 IRRITABLE BOWEL SYNDROME WITH CONSTIPATION: ICD-10-CM

## 2017-05-01 PROCEDURE — 99214 OFFICE O/P EST MOD 30 MIN: CPT | Mod: S$PBB,,, | Performed by: NURSE PRACTITIONER

## 2017-05-01 PROCEDURE — 99214 OFFICE O/P EST MOD 30 MIN: CPT | Mod: PBBFAC | Performed by: NURSE PRACTITIONER

## 2017-05-01 PROCEDURE — 99999 PR PBB SHADOW E&M-EST. PATIENT-LVL IV: CPT | Mod: PBBFAC,,, | Performed by: NURSE PRACTITIONER

## 2017-05-01 RX ORDER — MELOXICAM 15 MG/1
15 TABLET ORAL DAILY
COMMUNITY
Start: 2017-04-19 | End: 2017-08-09

## 2017-05-01 RX ORDER — LUBIPROSTONE 8 UG/1
8 CAPSULE ORAL 2 TIMES DAILY WITH MEALS
Qty: 60 CAPSULE | Refills: 2 | Status: SHIPPED | OUTPATIENT
Start: 2017-05-01 | End: 2017-05-03 | Stop reason: SDUPTHER

## 2017-05-01 RX ORDER — METHOCARBAMOL 750 MG/1
750 TABLET, FILM COATED ORAL
COMMUNITY
Start: 2017-04-19 | End: 2017-08-09 | Stop reason: SDUPTHER

## 2017-05-01 NOTE — MR AVS SNAPSHOT
[facility]  [address] [facility] [address]  Phone: [phone]  Fax: [phone]                  Anat Parks   2017 7:30 AM   Office Visit    Description:  Female : 1980   Provider:  Mary Dickinson NP   Department:  [facility]           Reason for Visit     6 month checkup     Irritable Bowel Syndrome           Diagnoses this Visit        Comments    Follow up    -  Primary     Irritable bowel syndrome with constipation         Inattention         Screening for cardiovascular condition         Screening for thyroid disorder                To Do List           Future Appointments        Provider Department Dept Phone    2017 8:30 AM Devorah Womack MD [facility] [phone]    2017 8:00 AM LAB, [facility] [phone]    2017 8:00 AM Mary Dickinson NP [facility] [phone]      Goals (5 Years of Data)     None      Follow-Up and Disposition     Return in about 6 months (around 2017) for schedule with Dr. oWmack for ADD eval and Dr. Diaz for c-scope/ibs.       These Medications        Disp Refills Start End    lubiprostone (AMITIZA) 8 MCG Cap 60 capsule 2 2017     Take 1 capsule (8 mcg total) by mouth 2 (two) times daily with meals. - Oral    Pharmacy: [facility] [address] [place] [address] #: [phone]         [facility]     [facility] Care [facility] -  Assistance  Unless otherwise directed by your provider, please contact [facility], our nurse care line that is available for  assistance.     Registered nurses in the [facility] provide: appointment scheduling, clinical advisement, health education, and other advisory services.  Call: [phone] (toll free)               Medications           Message regarding Medications     Verify the changes and/or additions to your  medication regime listed below are the same as discussed with your clinician today.  If any of these changes or additions are incorrect, please notify your healthcare provider.        START taking these NEW medications        Refills    lubiprostone (AMITIZA) 8 MCG Cap 2    Sig: Take 1 capsule (8 mcg total) by mouth 2 (two) times daily with meals.    Class: Normal    Route: Oral      STOP taking these medications     buPROPion (WELLBUTRIN XL) 300 MG 24 hr tablet Take 1 tablet (300 mg total) by mouth once daily.    CAFFEINE ORAL Take 1 tablet by mouth daily as needed.    cyclobenzaprine (FLEXERIL) 5 MG tablet Take 1 tablet (5 mg total) by mouth nightly as needed for Muscle spasms.    promethazine (PHENERGAN) 12.5 MG Tab Take 1 tablet (12.5 mg total) by mouth every 6 (six) hours as needed (nausea/vomiting).    tramadol-acetaminophen 37.5-325 mg (ULTRACET) 37.5-325 mg Tab Take 1 tablet by mouth every 6 (six) hours as needed for Pain.           Verify that the below list of medications is an accurate representation of the medications you are currently taking.  If none reported, the list may be blank. If incorrect, please contact your healthcare provider. Carry this list with you in case of emergency.           Current Medications     dicyclomine (BENTYL) 20 mg tablet TAKE 1 TABLET FOUR TIMES A DAY BEFORE MEALS AND NIGHTLY    diphenhydrAMINE (BENADRYL) 25 mg capsule Take 25 mg by mouth every 6 (six) hours as needed for Itching.    eszopiclone 3 mg Tab Take 1 tablet (3 mg total) by mouth nightly as needed.    ibuprofen (ADVIL,MOTRIN) 800 MG tablet Take 1 tablet (800 mg total) by mouth every 8 (eight) hours as needed for Pain (cramping).    meloxicam (MOBIC) 15 MG tablet Take 15 mg by mouth once daily.    methocarbamol (ROBAXIN) 750 MG Tab Take 750 mg by mouth 3 (three) times daily before meals.    omeprazole (PRILOSEC) 10 MG capsule TAKE 1 CAPSULE DAILY    lubiprostone (AMITIZA) 8 MCG Cap Take 1 capsule (8 mcg total) by  "mouth 2 (two) times daily with meals.           Clinical Reference Information           Your Vitals Were     BP Pulse Resp Height Weight Last Period    110/80 (BP Location: Left arm, Patient Position: Sitting, BP Method: Manual) 80 18 5' 8" (1.727 m) 112.1 kg (247 lb 2.2 oz) 11/16/2016 (Exact Date)    BMI                37.58 kg/m2          Blood Pressure          Most Recent Value    BP  110/80      Allergies as of 5/1/2017     No Known Allergies      Immunizations Administered on Date of Encounter - 5/1/2017     None      Orders Placed During Today's Visit      Normal Orders This Visit    Ambulatory referral to Gastroenterology     Ambulatory Referral to Internal Medicine     Future Labs/Procedures Expected by Expires    CBC auto differential  10/28/2017 (Approximate) 5/1/2018    Comprehensive metabolic panel  10/28/2017 (Approximate) 5/1/2018      Language Assistance Services     ATTENTION: Language assistance services are available, free of charge. Please call [phone].      ATENCIÓN: Si habla [place], tiene a morocho disposición servicios gratuitos de asistencia lingüística. Llame al [phone].     YOUSIF Ý: N?u b?n nói Ti?ng Vi?t, có các d?ch v? h? tr? ngôn ng? mi?n phí dành cho b?n. G?i s? [phone].         [facility] complies with applicable Federal civil rights laws and does not discriminate on the basis of race, color, national origin, age, disability, or sex.        "

## 2017-05-01 NOTE — PROGRESS NOTES
"Subjective:       Patient ID: Anat Parks is a 37 y.o. female.    Chief Complaint: 6 month checkup and Irritable Bowel Syndrome (flaring up)    HPI: pt known to me presents for 6 month check up. Reports having trouble with IBS. Reports taking bentyl with no relief. Reports she has been suffering with this x 15 years. Has had c-scope about 15 years ago. Reports she has "dagger-like" pain to right lower/mid side, relieved with defecation and then sometimes 2 hours later, needing to go again. Does not have a GB.     Reports she feels "squirrly". Reports her MIL told her she thinks she has ADHD. Reports she cannot complete a task. Also reports she recently invoiced the wrong person. Afraid she will loose her job. This started in January.     Lab Results   Component Value Date    WBC 7.33 04/28/2017    HGB 12.3 04/28/2017    HCT 38.0 04/28/2017     04/28/2017    CHOL 161 04/28/2017    TRIG 104 04/28/2017    HDL 50 04/28/2017    ALT 27 04/28/2017    AST 16 04/28/2017     04/28/2017    K 4.3 04/28/2017     04/28/2017    CREATININE 0.8 04/28/2017    BUN 14 04/28/2017    CO2 26 04/28/2017    TSH 2.874 04/28/2017         Review of Systems   Constitutional: Negative for chills, diaphoresis, fatigue and fever.   Respiratory: Negative for cough, shortness of breath and wheezing.    Cardiovascular: Negative for chest pain.   Gastrointestinal: Negative for abdominal distention, abdominal pain, constipation, diarrhea, nausea and vomiting.   Musculoskeletal: Negative for arthralgias, back pain and myalgias.   Neurological: Negative for headaches.   Psychiatric/Behavioral: Positive for decreased concentration. Negative for agitation, behavioral problems, confusion, dysphoric mood, hallucinations, self-injury, sleep disturbance and suicidal ideas. The patient is nervous/anxious. The patient is not hyperactive.        Objective:      Physical Exam   Constitutional: She is oriented to person, place, and time. " She appears well-developed and well-nourished.   HENT:   Head: Normocephalic and atraumatic.   Cardiovascular: Normal rate, regular rhythm and normal heart sounds.    Pulmonary/Chest: Effort normal and breath sounds normal.   Abdominal: Soft. Bowel sounds are normal. There is no tenderness.   Musculoskeletal: She exhibits no edema.   Neurological: She is alert and oriented to person, place, and time.   Skin: Skin is warm and dry.   Psychiatric: She has a normal mood and affect. Her behavior is normal. Judgment and thought content normal.   Crying talking about worried she may loose her job.    Nursing note and vitals reviewed.      Assessment:       1. Follow up    2. Irritable bowel syndrome with constipation    3. Inattention    4. Screening for cardiovascular condition    5. Screening for thyroid disorder        Plan:     1. Follow up      2. Irritable bowel syndrome with constipation  Will do trial of amitiza, but i think she should see GI as well.   - Ambulatory referral to Gastroenterology  - CBC auto differential; Future  - Comprehensive metabolic panel; Future    3. Inattention  Will refer to Dr. Womack. Gained weight with wellbutrin and feels like it was no longer working for her.   - Ambulatory Referral to Internal Medicine    4. Screening for cardiovascular condition  5. Screening for thyroid disorder  BW looks great.

## 2017-05-03 ENCOUNTER — OFFICE VISIT (OUTPATIENT)
Dept: INTERNAL MEDICINE | Facility: CLINIC | Age: 37
End: 2017-05-03
Payer: OTHER GOVERNMENT

## 2017-05-03 VITALS
WEIGHT: 246 LBS | SYSTOLIC BLOOD PRESSURE: 124 MMHG | HEIGHT: 68 IN | HEART RATE: 87 BPM | OXYGEN SATURATION: 98 % | DIASTOLIC BLOOD PRESSURE: 80 MMHG | RESPIRATION RATE: 18 BRPM | BODY MASS INDEX: 37.28 KG/M2

## 2017-05-03 DIAGNOSIS — R41.840 POOR CONCENTRATION: ICD-10-CM

## 2017-05-03 DIAGNOSIS — F41.1 GAD (GENERALIZED ANXIETY DISORDER): Primary | ICD-10-CM

## 2017-05-03 DIAGNOSIS — F41.0 PANIC DISORDER: ICD-10-CM

## 2017-05-03 DIAGNOSIS — K58.1 IRRITABLE BOWEL SYNDROME WITH CONSTIPATION: ICD-10-CM

## 2017-05-03 DIAGNOSIS — F41.1 GAD (GENERALIZED ANXIETY DISORDER): ICD-10-CM

## 2017-05-03 PROCEDURE — 99214 OFFICE O/P EST MOD 30 MIN: CPT | Mod: S$PBB,,, | Performed by: INTERNAL MEDICINE

## 2017-05-03 PROCEDURE — 99213 OFFICE O/P EST LOW 20 MIN: CPT | Mod: PBBFAC,PN | Performed by: INTERNAL MEDICINE

## 2017-05-03 PROCEDURE — 99999 PR PBB SHADOW E&M-EST. PATIENT-LVL III: CPT | Mod: PBBFAC,,, | Performed by: INTERNAL MEDICINE

## 2017-05-03 RX ORDER — BUPROPION HYDROCHLORIDE 150 MG/1
150 TABLET ORAL DAILY
Qty: 30 TABLET | Refills: 11 | Status: SHIPPED | OUTPATIENT
Start: 2017-05-03 | End: 2017-05-03 | Stop reason: SDUPTHER

## 2017-05-03 RX ORDER — BUPROPION HYDROCHLORIDE 150 MG/1
150 TABLET ORAL DAILY
Qty: 90 TABLET | Refills: 1 | Status: SHIPPED | OUTPATIENT
Start: 2017-05-03 | End: 2017-08-09 | Stop reason: SDUPTHER

## 2017-05-03 RX ORDER — LUBIPROSTONE 8 UG/1
8 CAPSULE ORAL 2 TIMES DAILY WITH MEALS
Qty: 180 CAPSULE | Refills: 1 | Status: SHIPPED | OUTPATIENT
Start: 2017-05-03 | End: 2017-11-29 | Stop reason: SDUPTHER

## 2017-05-03 NOTE — PATIENT INSTRUCTIONS
Anxiety Reaction  Anxiety is the feeling we all get when we think something bad might happen. It is a normal response to stress and usually causes only a mild reaction. When anxiety becomes more severe, it can interfere with daily life. In some cases, you may not even be aware of what it is youre anxious about. There may also be a genetic link or it may be a learned behavior in the home.  Both psychological and physical triggers cause stress reaction. It's often a response to fear or emotional stress, real or imagined. This stress may come from home, family, work, or social relationships.  During an anxiety reaction, you may feel:  · Helpless  · Nervous  · Depressed  · Irritable  Your body may show signs of anxiety in many ways. You may experience:  · Dry mouth  · Shakiness  · Dizziness  · Weakness  · Trouble breathing  · Breathing fast (hyperventilating)  · Chest pressure  · Sweating  · Headache  · Nausea  · Diarrhea  · Tiredness  · Inability to sleep  · Sexual problems  Home care  · Try to locate the sources of stress in your life. They may not be obvious. These may include:  ¨ Daily hassles of life (traffic jams, missed appointments, car troubles, etc.)  ¨ Major life changes, both good (new baby, job promotion) and bad (loss of job, loss of loved one)  ¨ Overload: feeling that you have too many responsibilities and can't take care of all of them at once  ¨ Feeling helpless, feeling that your problems are beyond what youre able to solve  · Notice how your body reacts to stress. Learn to listen to your body signals. This will help you take action before the stress becomes severe.  · When you can, do something about the source of your stress. (Avoid hassles, limit the amount of change that happens in your life at one time and take a break when you feel overloaded).  · Unfortunately, many stressful situations can't be avoided. It is necessary to learn how to better manage stress. There are many proven methods  that will reduce your anxiety. These include simple things like exercise, good nutrition and adequate rest. Also, there are certain techniques that are helpful:  ¨ Relaxation  ¨ Breathing exercises  ¨ Visualization  ¨ Biofeedback  ¨ Meditation  For more information about this, consult your doctor or go to a local bookstore and review the many books and tapes available on this subject.  Follow-up care  If you feel that your anxiety is not responding to self-help measures, contact your doctor or make an appointment with a counselor. You may need short-term psychological counseling and temporary medicine to help you manage stress.  Call 911  Call your healthcare provider right away if any of these occur:  · Trouble breathing  · Confusion  · Drowsiness or trouble wakening  · Fainting or loss of consciousness  · Rapid heart rate  · Seizure  · New chest pain that becomes more severe, lasts longer, or spreads into your shoulder, arm, neck, jaw, or back  When to seek medical advice  Call your healthcare provider right away if any of these occur:  · Your symptoms get worse  · Severe headache not relieved by rest and mild pain reliever  Date Last Reviewed: 9/29/2015  © 4405-5867 [employer]. [address] [place] [address]. All rights reserved. This information is not intended as a substitute for professional medical care. Always follow your healthcare professional's instructions.

## 2017-05-03 NOTE — Clinical Note
Hello,  I don't think she needs stimulants and if she wanted them I really think she needs a full psych eval to determine if this is anxiety or true ADD. I think more uncontrolled anxiety. She doesn't want to see Dr. Vaz. I put her back on Wellbutrin and said she can keep seeing you if she wants.  Let me know if you have any questions, Devorah

## 2017-05-03 NOTE — MR AVS SNAPSHOT
[facility]  [address] [place] [address]  Phone: [phone]  Fax: [phone]                  Anat Parks   5/3/2017 8:15 AM   Office Visit    Description:  Female : 1980   Provider:  Devorah Womack MD   Department:  [facility]           Reason for Visit     Consult           Diagnoses this Visit        Comments    CHRISTINE (generalized anxiety disorder)    -  Primary     Panic disorder         Poor concentration                To Do List           Future Appointments        Provider Department Dept Phone    2017 8:00 AM LAB, [facility] [phone]    2017 8:00 AM Mary Dickinson NP [facility] [phone]      Goals (5 Years of Data)     None      Follow-Up and Disposition     Return if symptoms worsen or fail to improve.       These Medications        Disp Refills Start End    buPROPion (WELLBUTRIN XL) 150 MG TB24 tablet 30 tablet 11 5/3/2017 5/3/2018    Take 1 tablet (150 mg total) by mouth once daily. - Oral    Pharmacy: [facility] [address] [place] [address] #: [phone]         [facility]     [facility] Care [facility] -  Assistance  Unless otherwise directed by your provider, please contact [facility], our nurse care line that is available for  assistance.     Registered nurses in the [facility] provide: appointment scheduling, clinical advisement, health education, and other advisory services.  Call: [phone] (toll free)               Medications           Message regarding Medications     Verify the changes and/or additions to your medication regime listed below are the same as discussed with your clinician today.  If any of these changes or additions are incorrect, please notify your healthcare provider.        START taking these NEW medications        Refills    buPROPion (WELLBUTRIN XL) 150 MG TB24  "tablet 11    Sig: Take 1 tablet (150 mg total) by mouth once daily.    Class: Normal    Route: Oral           Verify that the below list of medications is an accurate representation of the medications you are currently taking.  If none reported, the list may be blank. If incorrect, please contact your healthcare provider. Carry this list with you in case of emergency.           Current Medications     dicyclomine (BENTYL) 20 mg tablet TAKE 1 TABLET FOUR TIMES A DAY BEFORE MEALS AND NIGHTLY    diphenhydrAMINE (BENADRYL) 25 mg capsule Take 25 mg by mouth every 6 (six) hours as needed for Itching.    eszopiclone 3 mg Tab Take 1 tablet (3 mg total) by mouth nightly as needed.    ibuprofen (ADVIL,MOTRIN) 800 MG tablet Take 1 tablet (800 mg total) by mouth every 8 (eight) hours as needed for Pain (cramping).    lubiprostone (AMITIZA) 8 MCG Cap Take 1 capsule (8 mcg total) by mouth 2 (two) times daily with meals.    meloxicam (MOBIC) 15 MG tablet Take 15 mg by mouth once daily.    methocarbamol (ROBAXIN) 750 MG Tab Take 750 mg by mouth 3 (three) times daily before meals.    omeprazole (PRILOSEC) 10 MG capsule TAKE 1 CAPSULE DAILY    buPROPion (WELLBUTRIN XL) 150 MG TB24 tablet Take 1 tablet (150 mg total) by mouth once daily.           Clinical Reference Information           Your Vitals Were     BP Pulse Resp Height Weight Last Period    124/80 87 18 5' 8" (1.727 m) 111.6 kg (246 lb) 11/16/2016 (Exact Date)    SpO2 BMI             98% 37.4 kg/m2         Blood Pressure          Most Recent Value    BP  124/80      Allergies as of 5/3/2017     No Known Allergies      Immunizations Administered on Date of Encounter - 5/3/2017     None      Instructions      Anxiety Reaction  Anxiety is the feeling we all get when we think something bad might happen. It is a normal response to stress and usually causes only a mild reaction. When anxiety becomes more severe, it can interfere with daily life. In some cases, you may not even be " aware of what it is youre anxious about. There may also be a genetic link or it may be a learned behavior in the home.  Both psychological and physical triggers cause stress reaction. It's often a response to fear or emotional stress, real or imagined. This stress may come from home, family, work, or social relationships.  During an anxiety reaction, you may feel:  · Helpless  · Nervous  · Depressed  · Irritable  Your body may show signs of anxiety in many ways. You may experience:  · Dry mouth  · Shakiness  · Dizziness  · Weakness  · Trouble breathing  · Breathing fast (hyperventilating)  · Chest pressure  · Sweating  · Headache  · Nausea  · Diarrhea  · Tiredness  · Inability to sleep  · Sexual problems  Home care  · Try to locate the sources of stress in your life. They may not be obvious. These may include:  ¨ Daily hassles of life (traffic jams, missed appointments, car troubles, etc.)  ¨ Major life changes, both good (new baby, job promotion) and bad (loss of job, loss of loved one)  ¨ Overload: feeling that you have too many responsibilities and can't take care of all of them at once  ¨ Feeling helpless, feeling that your problems are beyond what youre able to solve  · Notice how your body reacts to stress. Learn to listen to your body signals. This will help you take action before the stress becomes severe.  · When you can, do something about the source of your stress. (Avoid hassles, limit the amount of change that happens in your life at one time and take a break when you feel overloaded).  · Unfortunately, many stressful situations can't be avoided. It is necessary to learn how to better manage stress. There are many proven methods that will reduce your anxiety. These include simple things like exercise, good nutrition and adequate rest. Also, there are certain techniques that are helpful:  ¨ Relaxation  ¨ Breathing exercises  ¨ Visualization  ¨ Biofeedback  ¨ Meditation  For more information about this,  consult your doctor or go to a local bookstore and review the many books and tapes available on this subject.  Follow-up care  If you feel that your anxiety is not responding to self-help measures, contact your doctor or make an appointment with a counselor. You may need short-term psychological counseling and temporary medicine to help you manage stress.  Call 911  Call your healthcare provider right away if any of these occur:  · Trouble breathing  · Confusion  · Drowsiness or trouble wakening  · Fainting or loss of consciousness  · Rapid heart rate  · Seizure  · New chest pain that becomes more severe, lasts longer, or spreads into your shoulder, arm, neck, jaw, or back  When to seek medical advice  Call your healthcare provider right away if any of these occur:  · Your symptoms get worse  · Severe headache not relieved by rest and mild pain reliever  Date Last Reviewed: 9/29/2015  © 5587-7788 [employer]. [address] [place] [address]. All rights reserved. This information is not intended as a substitute for professional medical care. Always follow your healthcare professional's instructions.             Language Assistance Services     ATTENTION: Language assistance services are available, free of charge. Please call [phone].      ATENCIÓN: Si habla español, tiene a morocho disposición servicios gratuitos de asistencia lingüística. Llame al [phone].     YOUSIF Ý: N?u b?n nói Ti?ng Vi?t, có các d?ch v? h? tr? ngôn ng? mi?n phí dành cho b?n. G?i s? [phone].         [facility] complies with applicable Federal civil rights laws and does not discriminate on the basis of race, color, national origin, age, disability, or sex.

## 2017-06-26 DIAGNOSIS — K58.2 IRRITABLE BOWEL SYNDROME WITH BOTH CONSTIPATION AND DIARRHEA: ICD-10-CM

## 2017-06-27 RX ORDER — DICYCLOMINE HYDROCHLORIDE 20 MG/1
TABLET ORAL
Qty: 360 TABLET | Refills: 0 | Status: SHIPPED | OUTPATIENT
Start: 2017-06-27 | End: 2020-11-25 | Stop reason: SDUPTHER

## 2017-06-29 DIAGNOSIS — K29.70 GASTRITIS: ICD-10-CM

## 2017-06-29 RX ORDER — OMEPRAZOLE 10 MG/1
CAPSULE, DELAYED RELEASE ORAL
Qty: 90 CAPSULE | Refills: 1 | Status: SHIPPED | OUTPATIENT
Start: 2017-06-29 | End: 2017-08-09

## 2017-07-05 ENCOUNTER — HOSPITAL ENCOUNTER (EMERGENCY)
Facility: HOSPITAL | Age: 37
Discharge: HOME | End: 2017-07-05
Attending: EMERGENCY MEDICINE
Payer: OTHER GOVERNMENT

## 2017-07-05 VITALS
HEART RATE: 89 BPM | TEMPERATURE: 98 F | RESPIRATION RATE: 16 BRPM | BODY MASS INDEX: 36.95 KG/M2 | SYSTOLIC BLOOD PRESSURE: 139 MMHG | DIASTOLIC BLOOD PRESSURE: 104 MMHG | WEIGHT: 243 LBS

## 2017-07-05 DIAGNOSIS — L23.7 POISON IVY DERMATITIS: Primary | ICD-10-CM

## 2017-07-05 PROCEDURE — 99283 EMERGENCY DEPT VISIT LOW MDM: CPT | Mod: 25

## 2017-07-05 PROCEDURE — 96372 THER/PROPH/DIAG INJ SC/IM: CPT

## 2017-07-05 PROCEDURE — 96374 THER/PROPH/DIAG INJ IV PUSH: CPT

## 2017-07-05 PROCEDURE — 63600175 PHARM REV CODE 636 W HCPCS: Performed by: EMERGENCY MEDICINE

## 2017-07-05 RX ORDER — DEXAMETHASONE SODIUM PHOSPHATE 4 MG/ML
12 INJECTION, SOLUTION INTRA-ARTICULAR; INTRALESIONAL; INTRAMUSCULAR; INTRAVENOUS; SOFT TISSUE
Status: COMPLETED | OUTPATIENT
Start: 2017-07-05 | End: 2017-07-05

## 2017-07-05 RX ORDER — PREDNISONE 50 MG/1
50 TABLET ORAL DAILY
Qty: 10 TABLET | Refills: 0 | Status: SHIPPED | OUTPATIENT
Start: 2017-07-05 | End: 2017-07-15

## 2017-07-05 RX ORDER — DIPHENHYDRAMINE HYDROCHLORIDE 50 MG/ML
25 INJECTION INTRAMUSCULAR; INTRAVENOUS
Status: COMPLETED | OUTPATIENT
Start: 2017-07-05 | End: 2017-07-05

## 2017-07-05 RX ORDER — SULFAMETHOXAZOLE AND TRIMETHOPRIM 800; 160 MG/1; MG/1
1 TABLET ORAL 2 TIMES DAILY
Qty: 14 TABLET | Refills: 0 | Status: SHIPPED | OUTPATIENT
Start: 2017-07-05 | End: 2017-07-12

## 2017-07-05 RX ADMIN — DIPHENHYDRAMINE HYDROCHLORIDE 25 MG: 50 INJECTION, SOLUTION INTRAMUSCULAR; INTRAVENOUS at 03:07

## 2017-07-05 RX ADMIN — DEXAMETHASONE SODIUM PHOSPHATE 12 MG: 4 INJECTION, SOLUTION INTRAMUSCULAR; INTRAVENOUS at 03:07

## 2017-07-05 NOTE — ED PROVIDER NOTES
[facility]                                                  Chief Complaint  37 y.o. female with Poison Ivy    History of Present Illness  Anat Parks presents to the emergency room with poison ivy to right thigh, left thigh and right upper arm.  Patient believes that she was exposed to weeks ago, however today she suddenly erupted with new blisters.  She denies fever.      Past Medical History:   Diagnosis Date    Abdominal pain, other specified site     Right lower abdomen    Acid reflux     Anxiety     Bilateral ovarian cysts     Mostly on right, but left also    Bulging lumbar disc     Depression     Fatigue     Fibroid tumor     Right ovary-sm    Heartburn     Hiatal hernia     IBS (irritable bowel syndrome)     Menorrhagia      Past Surgical History:   Procedure Laterality Date     SECTION  3/1997; 2000    COLONOSCOPY      ESOPHAGOGASTRODUODENOSCOPY      ESOPHAGOGASTRODUODENOSCOPY      gall bladder  2011    HYSTERECTOMY  2016    [facility] BLEEDING     TUBAL LIGATION  2000      Review of patient's allergies indicates:  No Known Allergies     Review of Systems and Physical Exam     Review of Systems  -- Constitution - no fever, denies fatigue, no weakness, no chills  -- Eyes - no tearing or redness, no visual disturbance  -- Ear, Nose - no tinnitus or earache, no nasal congestion or discharge  -- Mouth,Throat - no sore throat, no toothache, normal voice, normal swallowing  -- Respiratory - denies cough and congestion, no shortness of breath, no wheezing  -- Cardiovascular - denies chest pain, no palpitations, denies claudication  -- Gastrointestinal - denies abdominal pain, nausea, vomiting, or diarrhea  -- Genitourinary - no dysuria, no denies flank pain, no hematuria or frequency   -- Musculoskeletal - denies back pain, negative for myalgias and arthralgias   -- Neurological - no headache, denies weakness or seizure; no LOC  -- Skin -  denies pallor, rash, or changes in skin.  Reports poison ivy to right and left thighs and upper right arm    Vital Signs   weight is 110.2 kg (243 lb). Her oral temperature is 98 °F (36.7 °C). Her blood pressure is 139/104 (abnormal) and her pulse is 89. Her respiration is 16.      Physical Exam  -- Nursing note and vitals reviewed  -- Constitutional: Appears well-developed and well-nourished  -- Head: Atraumatic. Normocephalic. No obvious abnormality  -- Eyes: Pupils are equal and reactive to light. Normal conjunctiva and lids  -- Nose: Nose normal in appearance, nares grossly normal. No discharge  -- Throat: Mucous membranes moist, pharynx normal, normal tonsils. No lesions   -- Ears: External ears and TM normal bilaterally. Normal hearing and no drainage  -- Neck: Normal range of motion. Neck supple. No masses, trachea midline  -- Cardiac: Normal rate, regular rhythm and normal heart sounds  -- Pulmonary: Normal respiratory effort, breath sounds clear to auscultation  -- Abdominal: Soft, no tenderness. Normal bowel sounds. Normal liver edge  -- Musculoskeletal: Normal range of motion, no effusions. Joints stable   -- Neurological: No focal deficits. Showed good interaction with staff  -- Vascular: Posterior tibial, dorsalis pedis and radial pulses 2+ bilaterally    -- Lymphatics: No cervical or peripheral lymphadenopathy. No edema noted  -- Skin: Warm and dry.  Lesions consistent with poison ivy on right thigh and left thigh and upper right arm, blisters present and surrounding erythema.  -- Psychiatric: Normal mood and affect. Bedside behavior is appropriate    Emergency Room Course     Treatment and Evaluation  1.  Physical exam consistent with poison ivy and possible localized reaction versus cellulitis  2.  What she patient with steroids as treatment for contact dermatitis, however we'll send patient home with Bactrim if symptoms do not improve.  3.  Follow up PCP this week    Abnormal lab values  Labs  Reviewed - No data to display    Medications Given  Medications   dexamethasone injection 12 mg (not administered)   diphenhydrAMINE injection 25 mg (not administered)         Diagnosis  -- Contact dermatitis    Disposition and Plan  -- Disposition: home  -- Condition: stable  -- Follow-up: Patient to follow up with Gracy Zimmer MD in 1-2 days.  -- I advised the patient that we have found no life threatening condition today  -- At this time, I believe the patient is clinically stable for discharge.   -- The patient acknowledges that close follow up with a MD is required   -- Patient agrees to comply with all instruction and direction given in the ER  -- Patient counseled on strict return precautions as discussed       Summer Lund MD  07/05/17 0337

## 2017-07-05 NOTE — DISCHARGE INSTRUCTIONS
Take 10 days of prednisone for contact dermatitis. If condition appears to worsen, stop prednisone and start Bactrim. Follow up PCP first available.

## 2017-08-09 ENCOUNTER — OFFICE VISIT (OUTPATIENT)
Dept: INTERNAL MEDICINE | Facility: CLINIC | Age: 37
End: 2017-08-09
Payer: OTHER GOVERNMENT

## 2017-08-09 VITALS
HEART RATE: 85 BPM | HEIGHT: 68 IN | WEIGHT: 253.5 LBS | SYSTOLIC BLOOD PRESSURE: 112 MMHG | DIASTOLIC BLOOD PRESSURE: 70 MMHG | RESPIRATION RATE: 18 BRPM | BODY MASS INDEX: 38.42 KG/M2

## 2017-08-09 DIAGNOSIS — R41.840 POOR CONCENTRATION: ICD-10-CM

## 2017-08-09 DIAGNOSIS — F41.0 PANIC DISORDER: ICD-10-CM

## 2017-08-09 DIAGNOSIS — F41.1 GAD (GENERALIZED ANXIETY DISORDER): ICD-10-CM

## 2017-08-09 DIAGNOSIS — E66.01 SEVERE OBESITY (BMI 35.0-39.9) WITH COMORBIDITY: ICD-10-CM

## 2017-08-09 DIAGNOSIS — Z09 FOLLOW UP: Primary | ICD-10-CM

## 2017-08-09 PROCEDURE — 99213 OFFICE O/P EST LOW 20 MIN: CPT | Mod: PBBFAC | Performed by: NURSE PRACTITIONER

## 2017-08-09 PROCEDURE — 99999 PR PBB SHADOW E&M-EST. PATIENT-LVL III: CPT | Mod: PBBFAC,,, | Performed by: NURSE PRACTITIONER

## 2017-08-09 PROCEDURE — 3008F BODY MASS INDEX DOCD: CPT | Mod: ,,, | Performed by: NURSE PRACTITIONER

## 2017-08-09 PROCEDURE — 99214 OFFICE O/P EST MOD 30 MIN: CPT | Mod: S$PBB,,, | Performed by: NURSE PRACTITIONER

## 2017-08-09 RX ORDER — AMITRIPTYLINE HYDROCHLORIDE 50 MG/1
50 TABLET, FILM COATED ORAL NIGHTLY
COMMUNITY
Start: 2017-07-12 | End: 2017-11-15 | Stop reason: SDUPTHER

## 2017-08-09 RX ORDER — FENOPROFEN CALCIUM 400 MG/1
400 CAPSULE ORAL 3 TIMES DAILY PRN
COMMUNITY
Start: 2017-07-12 | End: 2017-10-26

## 2017-08-09 RX ORDER — BUPROPION HYDROCHLORIDE 300 MG/1
300 TABLET ORAL DAILY
Qty: 90 TABLET | Refills: 1 | Status: SHIPPED | OUTPATIENT
Start: 2017-08-09 | End: 2018-02-07 | Stop reason: ALTCHOICE

## 2017-08-09 RX ORDER — CHLORZOXAZONE 750 MG/1
750 TABLET ORAL DAILY
COMMUNITY
Start: 2017-06-08 | End: 2017-10-02

## 2017-08-09 NOTE — PROGRESS NOTES
Subjective:       Patient ID: Anat Parks is a 37 y.o. female.    Chief Complaint: Personal Problem    HPI: pt known to me presents for obesity complicated by back pain. She has been trying to loose weight, but so limited secondary to pain in back as for as back pain. History of running 5 miles a day. Since back has continued to gain weight. BMI: 38.55.     Review of Systems   Constitutional: Positive for activity change, fatigue and unexpected weight change. Negative for chills, diaphoresis and fever.   HENT: Negative for hearing loss, rhinorrhea and trouble swallowing.    Eyes: Negative for discharge and visual disturbance.   Respiratory: Negative for chest tightness and wheezing.    Cardiovascular: Positive for palpitations. Negative for chest pain.   Gastrointestinal: Positive for constipation and diarrhea. Negative for blood in stool and vomiting.   Endocrine: Negative for polydipsia and polyuria.   Genitourinary: Negative for difficulty urinating, dysuria, hematuria and menstrual problem.   Musculoskeletal: Positive for arthralgias and joint swelling. Negative for neck pain.   Neurological: Positive for weakness. Negative for headaches.   Psychiatric/Behavioral: Negative for confusion and dysphoric mood.       Objective:      Physical Exam   Constitutional: She is oriented to person, place, and time. She appears well-developed and well-nourished.   HENT:   Head: Normocephalic and atraumatic.   Cardiovascular: Normal rate, regular rhythm and normal heart sounds.    Pulmonary/Chest: Effort normal and breath sounds normal.   Abdominal: Soft. Bowel sounds are normal. There is no tenderness.   Musculoskeletal: She exhibits no edema.   Neurological: She is alert and oriented to person, place, and time.   Skin: Skin is warm and dry.   Psychiatric: She has a normal mood and affect. Her behavior is normal. Judgment and thought content normal.   Nursing note and vitals reviewed.      Assessment:       1. Follow up     2. Severe obesity (BMI 35.0-39.9) with comorbidity    3. CHRISTINE (generalized anxiety disorder)    4. Panic disorder    5. Poor concentration        Plan:     1. Follow up      2. Severe obesity (BMI 35.0-39.9) with comorbidity  Has tried diet and exercise with no avail.   - Ambulatory Referral to Bariatric Medicine    3. CHRISTINE (generalized anxiety disorder)    - buPROPion (WELLBUTRIN XL) 300 MG 24 hr tablet; Take 1 tablet (300 mg total) by mouth once daily.  Dispense: 90 tablet; Refill: 1    4. Panic disorder    - buPROPion (WELLBUTRIN XL) 300 MG 24 hr tablet; Take 1 tablet (300 mg total) by mouth once daily.  Dispense: 90 tablet; Refill: 1    5. Poor concentration    - buPROPion (WELLBUTRIN XL) 300 MG 24 hr tablet; Take 1 tablet (300 mg total) by mouth once daily.  Dispense: 90 tablet; Refill: 1

## 2017-10-02 ENCOUNTER — OFFICE VISIT (OUTPATIENT)
Dept: PSYCHIATRY | Facility: CLINIC | Age: 37
End: 2017-10-02
Payer: OTHER GOVERNMENT

## 2017-10-02 VITALS
WEIGHT: 262.44 LBS | HEART RATE: 88 BPM | HEIGHT: 68 IN | BODY MASS INDEX: 39.77 KG/M2 | RESPIRATION RATE: 21 BRPM | DIASTOLIC BLOOD PRESSURE: 86 MMHG | SYSTOLIC BLOOD PRESSURE: 119 MMHG

## 2017-10-02 DIAGNOSIS — F33.9 EPISODE OF RECURRENT MAJOR DEPRESSIVE DISORDER, UNSPECIFIED DEPRESSION EPISODE SEVERITY: ICD-10-CM

## 2017-10-02 DIAGNOSIS — F41.1 GAD (GENERALIZED ANXIETY DISORDER): Primary | ICD-10-CM

## 2017-10-02 DIAGNOSIS — F51.04 PSYCHOPHYSIOLOGICAL INSOMNIA: ICD-10-CM

## 2017-10-02 DIAGNOSIS — K58.8 OTHER IRRITABLE BOWEL SYNDROME: ICD-10-CM

## 2017-10-02 DIAGNOSIS — F41.0 PANIC DISORDER: ICD-10-CM

## 2017-10-02 PROCEDURE — 99214 OFFICE O/P EST MOD 30 MIN: CPT | Mod: S$PBB,,, | Performed by: PSYCHIATRY & NEUROLOGY

## 2017-10-02 PROCEDURE — 99213 OFFICE O/P EST LOW 20 MIN: CPT | Mod: PBBFAC | Performed by: PSYCHIATRY & NEUROLOGY

## 2017-10-02 PROCEDURE — 3008F BODY MASS INDEX DOCD: CPT | Mod: ,,, | Performed by: PSYCHIATRY & NEUROLOGY

## 2017-10-02 PROCEDURE — 99999 PR PBB SHADOW E&M-EST. PATIENT-LVL III: CPT | Mod: PBBFAC,,, | Performed by: PSYCHIATRY & NEUROLOGY

## 2017-10-02 RX ORDER — AMITRIPTYLINE HYDROCHLORIDE 25 MG/1
25 TABLET, FILM COATED ORAL DAILY
Qty: 30 TABLET | Refills: 0
Start: 2017-10-02 | End: 2017-11-15 | Stop reason: SDUPTHER

## 2017-10-02 RX ORDER — CYCLOBENZAPRINE HYDROCHLORIDE 30 MG/1
30 CAPSULE, EXTENDED RELEASE ORAL DAILY PRN
COMMUNITY
Start: 2017-09-25 | End: 2017-11-09

## 2017-10-02 RX ORDER — OMEPRAZOLE 10 MG/1
10 CAPSULE, DELAYED RELEASE ORAL EVERY MORNING
COMMUNITY
Start: 2017-09-27 | End: 2018-07-10

## 2017-10-02 NOTE — PROGRESS NOTES
"Outpatient Psychiatry Follow-Up Visit (MD/NP)    10/2/2017    Clinical Status of Patient:  Outpatient (Ambulatory)    Chief Complaint:  Anat Parks is a 37 y.o. female who presents today for follow-up of depression and anxiety.  Met with patient and spouse.      Interval History and Content of Current Session:  Interim Events/Subjective Report/Content of Current Session:   The patient was seen and her chart reviewed.    The patient represents to clinic after about a 1 year absence.     She has been partially compliant with treatment. She had no adverse reactions or side effects.     She had stopped her medications last January and was doing well until about May, when she relapsed with anxiety secondary to job stress (interpersonal issues). She had resumed Wellbutrin at 150 mg daily, then increased it to 300 mg in August. It has been helpful but not fully able to control symptoms. A close friend of hers committed suicide in 7-8/2017. This also exacerbated her anxiety. Their marriage is going well. She continues to deal with family struggles (balancing work/home/life responsibilities). Her  had a successful tx with the spinal stimulator for his chronic pain (looking to get the permanent one placed.     She had previously noticed a cyclical pattern to her anxiety and depression with her menstrual cycle- she has since had a hysterectomy in November of 2016 which resolved the pattern. She had a recent back injury, and she is following up with  PT. She was started on Elavil for her IBS with some effect. She is also pursuing gastric bypass surgery.     "I'm anxious."  She has not needed the vistaril at all since the last appointment for anxiety. She uses it about every other week for insomnia. She still requires the lunesta about 4 times per week.     She reports improved Symptoms of Depression: no diminished mood, no loss of interest/anhedonia; no irritability, no diminished energy, no change in sleep (see " below; improved overall), no change in appetite, no diminished concentration or cognition or indecisiveness, no PMA/R, no excessive guilt, denied hopelessness/worthlessness, no suicidal ideations    Controlled  Sleep disturbance: no trouble with initiation (relieved with medications- may happen occasionally; may be getting some tolerance to the lunesta), no issues maintenance, no early morning awakening with inability to return to sleep; getting about 6-8 hours per night on average.  No Suicidal/Homicidal ideations: no active/passive ideations, organized plans, or future intentions; no past attempts.     Denied Symptoms of CHRISTINE: no excessive anxiety/worry/fear, no restlessness, no fatigue, no poor concentration, no irritability, no muscle tension, no sleep disturbance     Increased symptoms of Panic Disorder: she averages about 3-4 panic attacks per week since the last appointment (occured at night, un-precipitated; averaging once per month and full dissipated in about 10 months); Vistaril  mg was ineffective at controlling symptoms.      No Symptoms of sexual disorders: no decrease in libido/desire (better); no issues and orgasmic (better); no pain    Previous medication trials include luvox (increased irritability)    Review of Systems   · PSYCHIATRIC: Pertinant items are noted in the narrative.  · CONSTITUTIONAL: No weight gain or loss.   · MUSCULOSKELETAL: No pain or stiffness of the joints.  · NEUROLOGIC: No weakness, sensory changes, seizures, confusion, memory loss, tremor or other abnormal movements.  · ENDOCRINE: No polydipsia or polyuria.  · INTEGUMENTARY: No rashes or lacerations.  · EYES: No exophthalmos, jaundice or blindness.  · ENT: No dizziness, tinnitus or hearing loss.  · RESPIRATORY: No shortness of breath.  · CARDIOVASCULAR: No tachycardia or chest pain.  · GASTROINTESTINAL: No nausea, vomiting, pain, constipation or diarrhea.  · GENITOURINARY: No frequency, dysuria or sexual  "dysfunction.  · HEMATOLOGIC/LYMPHATIC: No excessive bleeding, prolonged or excessive bleeding after dental extraction/injury.  · ALLERGIC/IMMUNOLOGIC: No allergic response to materials, foods or animals at this time.    Past Medical, Family and Social History: The patient's past medical, family and social history have been reviewed and updated as appropriate within the electronic medical record - see encounter notes.    Compliance: yes    Side effects: None    Risk Parameters:  Patient reports no suicidal ideation  Patient reports no homicidal ideation  Patient reports no self-injurious behavior  Patient reports no violent behavior           Exam (detailed: at least 9 elements; comprehensive: all 15 elements)   Constitutional  Vitals:  Most recent vital signs, dated less than 90 days prior to this appointment, were reviewed.   Vitals:    10/02/17 1143   BP: 119/86   Pulse: 88   Resp: (!) 21   Weight: 119 kg (262 lb 7.3 oz)   Height: 5' 8" (1.727 m)     Body mass index is 39.91 kg/m².         General:  unremarkable, age appropriate, casually dressed, neatly groomed, obese     Musculoskeletal  Muscle Strength/Tone:  no spasicity, no rigidity, no dyskinesia, no dystonia, no tremor   Gait & Station:  non-ataxic     Psychiatric  Speech:  no latency; no press, spontaneous   Mood & Affect:  steady, anxious   congruent and appropriate, mood-congruent, anxious   Thought Process:  normal and logical, goal-directed   Associations:  intact   Thought Content:  normal, no suicidality, no homicidality, delusions, or paranoia   Insight:  intact, has awareness of illness   Judgement: behavior is adequate to circumstances, age appropriate   Orientation:  person, place, situation, time/date, day of week, month of year, year   Memory: intact for content of interview, able to remember recent events- yes, able to remember remote events- yes   Language: grossly intact, able to name, able to repeat   Attention Span & Concentration:  able " to focus, completed tasks   Fund of Knowledge:  intact and appropriate to age and level of education, familiar with aspects of current personal life, 4 of 4 recent presidents     Assessment and Diagnosis   Status/Progress: Based on the examination today, the patient's problem(s) is/are inadequately controlled and worsening.  New problems have not been presented today.   Co-morbidities are not complicating management of the primary condition.  There are no active rule-out diagnoses for this patient at this time.     General Impression:  MDD, recurrent, moderate, in full remission  CHRISTINE  Panic Disorder without agoraphobia   Insomnia    IBS, GERD, ovarian cysts, obesity      Intervention/Counseling/Treatment Plan   Treatment Plan/Recommendations:     Medications:  -Wellbutrin  mg po qAM for depression and off-label CHRISTINE  -Continue Lunesta 3 mg Po q HS prn insomnia; will taper down when able  -Increase Elavil to 25 mg po q AM and 50 mg po q HS for adjunctive depression, anxiety and IBS      -Continue Probiotic for adjunctive depression     Discussed diagnosis, risks and benefits of proposed treatment vs alternative treatments vs no treatment, and potential side effects of these treatments.  The patient expresses understanding of the above and displays the capacity to agree with this treatment given said understanding.  Patient also agrees that, currently, the benefits outweigh the risks and would like to pursue treatment at this time.    Therapy:  Pt is not interested at this time      Counseled:  Counseled on illness and tx options as well as exercise for MH     Labs:   Labs reviewed        Return to Clinic: 1 month, sooner if needed     Yuriy Vaz MD

## 2017-10-25 ENCOUNTER — TELEPHONE (OUTPATIENT)
Dept: BARIATRICS | Facility: CLINIC | Age: 37
End: 2017-10-25

## 2017-10-26 ENCOUNTER — INITIAL CONSULT (OUTPATIENT)
Dept: BARIATRICS | Facility: CLINIC | Age: 37
End: 2017-10-26
Payer: OTHER GOVERNMENT

## 2017-10-26 VITALS
HEART RATE: 104 BPM | HEIGHT: 68 IN | DIASTOLIC BLOOD PRESSURE: 80 MMHG | WEIGHT: 257.69 LBS | SYSTOLIC BLOOD PRESSURE: 110 MMHG | BODY MASS INDEX: 39.06 KG/M2

## 2017-10-26 DIAGNOSIS — K21.9 GASTROESOPHAGEAL REFLUX DISEASE, ESOPHAGITIS PRESENCE NOT SPECIFIED: ICD-10-CM

## 2017-10-26 DIAGNOSIS — F41.1 GAD (GENERALIZED ANXIETY DISORDER): ICD-10-CM

## 2017-10-26 DIAGNOSIS — E66.01 SEVERE OBESITY (BMI 35.0-39.9) WITH COMORBIDITY: ICD-10-CM

## 2017-10-26 PROCEDURE — 99213 OFFICE O/P EST LOW 20 MIN: CPT | Mod: PBBFAC | Performed by: INTERNAL MEDICINE

## 2017-10-26 PROCEDURE — 99999 PR PBB SHADOW E&M-EST. PATIENT-LVL III: CPT | Mod: PBBFAC,,, | Performed by: INTERNAL MEDICINE

## 2017-10-26 PROCEDURE — 99215 OFFICE O/P EST HI 40 MIN: CPT | Mod: S$PBB,,, | Performed by: INTERNAL MEDICINE

## 2017-10-26 RX ORDER — TOPIRAMATE 50 MG/1
50 TABLET, FILM COATED ORAL 2 TIMES DAILY
Qty: 60 TABLET | Refills: 2 | Status: SHIPPED | OUTPATIENT
Start: 2017-10-26 | End: 2017-11-17 | Stop reason: SDUPTHER

## 2017-10-26 NOTE — PATIENT INSTRUCTIONS
Patient was informed that topiramate is used for migraine prevention and seizures. Weight loss is a common side effect that is well documented. She understands this. She was informed of the potential side effects such as serious and possibly fatal rash in which case the medication should be discontinued immediately. Paresthesias, forgetfulness, fatigue, kidney stones, GI symptoms, and changes in lab values such as electrolytes, blood counts and kidney function.    Start topiramate  in the evening for 1 week, then morning and evening.       3 meals a day made up of the following:  Unlimited green vegetables, tomatoes, mushrooms, spaghetti squash, cauliflower, meat, poultry, seafood, eggs and hard cheeses.   Milk and plain yogurt  Dressings, seasonings, condiments, etc should have less than 2 g sugars.   beans or nuts can have 1 x a day.   1-2 servings of citrus fruits, berries, pineapple or melon a day (1/2 cup)  Avoid fried foods    No grains, rice, pasta, potatoes, bread, corn, peas, oatmeal, grits, tortillas, crackers, chips    No soda, sweet tea, juices or lemonade    [email or web address].[email or web address] for recipes. Moderate carb intake      Fruits and Vegetables       Include 1-2 servings of fruit daily.      1 serving of fruit includes ½ cup unsweetened applesauce, ½ medium banana, tennis ball size piece of fruit, 17 grapes, 1 cup melon, 1 cup strawberries, ¼ cup dried fruit     Include 2-3 servings of vegetables daily. 1 serving is 1 cup raw or ½ cup cooked.     Non-starchy vegetables include artichoke, asparagus, baby corn, bamboo shoots, beans: green/Italian/wax, bean sprouts, beets, broccoli, [place] sprouts, cabbage, carrots, cauliflower, celery, cucumber, eggplant, green onions or scallions, greens, jicama, leeks, mushrooms, okra, onions, pea pods, peppers, radishes, spinach, summer squash, tomatoes and salsa, turnips, vegetable juice cocktail, water chestnuts, zucchini              Meal Ideas for Regular Bariatric  Diet  *Recipes and products available at [email or web address].[email or web address]      Breakfast: (15-20g protein)    - Egg white omelet: 2 egg whites or ½ cup Egg Beaters. (Optional proteins: cheese, shrimp, black beans, chicken, sliced turkey) (Optional veggies: tomatoes, salsa, spinach, mushrooms, onions, green peppers, or small slice avocado)     - Egg and sausage: 1 egg or ¼ cup Egg Beaters (any variety), with 1 nila or 2 links of Turkey sausage or Veggie breakfast sausage (Collexpo or [employer])    - Crust-less breakfast quiche: To make a glass pie dish, mix 4oz part skim Ricotta, 1 cup skim milk, and 2 eggs as your base. Add protein: shredded cheese, sliced lean ham or turkey, turkey sandoval/sausage. Add veggies: tomato, onion, green onion, mushroom, green pepper, spinach, etc.    - Yogurt parfait: Mix 1 - 6oz container Dannon Light N Fit vanilla yogurt, with ¼ cup Kashi Go Lean cereal    - Cottage cheese and fruit: ½ cup part-skim cottage cheese or ricotta cheese topped with fresh fruit or sugar free preserves     - Romy Humphries's Vanilla Egg custard* (add 2 Tbsp instant coffee granules to make Cappuccino Custard*)    - Hi-Protein café latte (skim milk, decaf coffee, 1 scoop protein powder). Optional to add Sugar free syrup or extract flavoring.    Lunch: (20-30g protein)    - ½ cup Black bean soup (Homemade or Progresso), with ¼ cup shredded low-fat cheese. Top with chopped tomato or fresh salsa.     - Lean deli turkey breast and low-fat sliced cheese, mustard or light chatman to moisten, rolled up together, or wrapped in a Edvin lettuce leaf    - Chicken salad made from dinner leftovers, moisten with low-fat salad dressing or light chatman. Also try leftover salmon, shrimp, tuna or boiled eggs. Serve ½ cup over dark green salad    - Fat-free canned refried beans, topped with ¼ cup shredded low-fat cheese. Top with chopped tomato or fresh salsa.     - Greek salad: Top mixed greens with 1-2oz grilled chicken, tomatoes, red  onions, 2-3 kalamata olives, and sprinkle lightly with feta cheese. Spritz with Balsamic vinegar to taste.     - Crust-less lunch quiche: To make a glass pie dish, mix 4oz part skim Ricotta, 1 cup skim milk, and 2 eggs as your base. Add protein: shredded cheese, sliced lean ham or turkey, shrimp, chicken. Add veggies: tomato, onion, green onion, mushroom, green pepper, spinach, artichoke, broccoli, etc.    - Pizza bake: tomato sauce, low-fat shredded mozzarella and turkey pepperoni or Citizen of the [place] sandoval. Add any veggies.    - Cucumber crab bites: Spread ¼ cup crab dip (lump crabmeat + light cream cheese and green onions) over sliced cucumber.     - Chicken with light spinach and artichoke dip*: Puree in : 6oz cooked and drained spinach, 2 cloves garlic, 1 can cannelloni beans, ½ cup chopped green onions, 1 can drained artichoke hearts (not marinated in oil), lemon juice and basil. Mix in 2oz chopped up chicken.    Supper: (20-30g protein)    - Serve grilled fish over dark green salad tossed with low-fat dressing, served with grilled asparagus quigley     - Rotisserie chicken salad: served with sliced strawberries, walnuts, fat-free feta cheese crumbles and 1 tbsp Ivys Own Light Raspberry Haines Vinaigrette    - Shrimp cocktail: Dip cold boiled shrimp in homemade low-sugar cocktail sauce (1/2 cup Alfa One Carb ketchup, 2 tbsp horseradish, 1/4 tsp hot sauce, 1 tsp Worcestershire sauce, 1 tbsp freshly-squeezed lemon juice). Serve with dark green salad, walnuts, and crumbled blue cheese drizzled with olive oil and Balsamic vinegar    - Tuna Melt: Spread tuna salad onto 2 thick slices of tomato. Top with low-fat cheese and broil until cheese is melted. May also be made with chicken salad of shrimp salad. Zearing with different types of cheeses.    - Homemade low-fat Chili using extra lean ground beef or ground turkey. Top with shredded cheese and salsa as desired. May add dollop fat-free sour cream if  desired    - Dinner Omelet with shrimp or chicken and onion, green peppers and chives.    - No noodle lasagna: Use sliced zucchini or eggplant in place of noodles.  Layer with part skim ricotta cheese and low sugar meat sauce (use very lean ground beef or ground turkey).    - Mexican chicken bake: Bake chunks of chicken breast or thigh with taco seasoning, Pace brand enchilada sauce, green onions and low-fat cheese. Serve with ¼ cup black beans or fat free refried beans topped with chopped tomatoes or salsa.    - Dread frozen meatballs, simmered in Classico Marinara sauce. Different flavors of salsa or spaghetti sauce create different dishes! Sprinkle with parmesan cheese. Serve with grilled or steamed veggies, or a dark green salad.    - Simmer boneless skinless chicken thigh chunks in Classico Marinara sauce or roasted salsa until tender with chopped onion, bell pepper, garlic, mushrooms, spinach, etc.     - Hamburger, without the bun, dressed the way you like. Served with grilled or steamed veggies.    - Eggplant parmesan: Bake slices of eggplant at 350 degrees for 15 minutes. Layer tomato sauce, sliced eggplant and low-fat mozzarella cheese in a baking dish and cover with foil. Bake 30-40 more minutes or until bubbly. Uncover and bake at 400 degrees for about 15 more minutes, or until top is slightly crisp.    - Fish tacos: grilled/baked white fish, wrapped in Edvin lettuce leaf, topped with salsa, shredded low-fat cheese, and light coleslaw.    Snacks: (100-200 calories; >5g protein)    - 1 low-fat cheese stick with 8 cherry tomatoes or 1 serving fresh fruit  - 4 thin slices fat-free turkey breast and 1 slice low-fat cheese  - 4 thin slices fat-free honey ham with wedge of melon  - 1/4 cup unsalted nuts with ½ cup fruit  - 6-oz container Dannon Light n Fit vanilla yogurt, topped with 1oz unsalted nuts         - apple, celery or baby carrots spread with 2 Tbsp natural peanut butter or almond butter   -  apple slices with 1 oz slice low-fat cheese  - celery, cucumber, bell pepper or baby carrots dipped in ¼ cup hummus bean spread or light spinach and artichoke dip (*recipe in lunch section)  - 100 calorie bag microwave light popcorn with 3 tbsp grated parmesan cheese  - Misael Links Beef Steak - 14g protein! (similar to beef jerky)  - 2 wedges [place] - Light Herb & Garlic Cheese with sliced cucumber or green bell pepper  - 1/2 cup low-fat cottage cheese with ¼ cup fruit or ¼ cup salsa  - RTD Protein drinks: Atkins, Low Carb Slim Fast, EAS light, Muscle Milk Light, etc.  - Homemade Protein drinks: GNC Soy95, Isopure, Nectar, UNJURY, Whey Gourmet, etc. Mix 1 scoop powder with 8oz skim/1% milk or light soymilk.  - Protein bars: Atkins, EAS, Pure Protein, Think Thin, Detour, etc. Must have 0-4 grams sugar - Read the label.    Takeout Options: No more than twice/week  Deli - Salads (no pasta or rice), meats, cheeses. Roasted chicken. Lox (salmon)    Mexican - Platters which don't include tortillas, chips, or rice. Go easy on the beans. Example: Fajitas without the tortillas. Ask the  not to bring chips to the table if they are too tempting.    Greek - Meat or fish and vegetable, but no bread or rice. Including hummus, baba ganoush, etc, is OK. Most sit-down Greek restaurants can provide you with cucumber slices for dipping instead of rao bread.    Fast Food (Avoid as much as possible) - Salads (no croutons and limit salad dressing to 2 tbsp), grilled chicken sandwich without the bun and ask for no chatman. Lavernes low fat chili or Taco Bell pintos and cheese.    BBQ - The meats are fine if you ask for sauces on the side, but most of the traditional side dishes are loaded with carbs. Ricki slaw, baked beans and BBQ sauce are typically made with sugar.    Chinese - Nothing deep-fried, no rice or noodles. Many Chinese sauces have starch and sugar in them, so you'll have to use your judgement. If you find that these  sauces trigger cravings, or cause Dumping, you can ask for the sauce to be made without sugar or just use soy sauce.    Helpful tips to survive the holidays:  - Dont save yourself for the meal: Make sure you continue to eat high protein small meals every 3-4 hours to ensure to do not become over-hungry. Avoid temptation by not showing up to a holiday party or gathering hungry.   - Plan ahead. Bring a dish to a party if you know there may not be an appropriate option.   - Choose sugar-free drinks: Stick to water or other sugar-free beverages and make sure you are getting 6-8 cups of fluid each day (but not with meals!). Avoid alcohol, carbonated beverages, and high-fat/high-sugar beverages like hot chocolate and eggnog. Try sugar-free hot cocoa made with skim milk or water, or sugar-free spiced tea to add some holiday flair to your beverage (see sugar-free mulled cider recipe below)  - Take your time: Eat mindfully. Dont graze on food throughout the day. Sit down to enjoy your small meals. Chew slowly and thoroughly. Cut your food into small pieces before eating.  - Listen to your body: Stop eating as soon as you feel full. Do not feel pressured to try certain (or all) foods or to eat all of the food on your plate. Listen to your hunger cues.   - REMEMBER: Make your holidays about spending time with family and friends instead of focusing gatherings around food.  - Keep up your exercise routine: Make sure you continue to get regular exercise throughout the holiday season. Encourage friends and family to be active by taking a walk together after a meal, to look at holiday lights, or to window-shop.    Good Holiday Meal Options:  - Roasted Turkey, NO skin. Use low sodium broth instead of gravy.   - Stuffed Bell Peppers made WITHOUT bread crumbs or Rice. Try using parmesan cheese instead  - Gumbo, NO rice. Try picking out mostly the meat/seafood and vegetables with little broth.   - Green Bean Casserole made with 98%  fat free cream of mushroom soup and crushed almonds/pecans instead of fried onions  - Side salad w/ low fat dressing. Try a different kind of salad maybe use Kale or spinach.   - Roasted non-starchy vegetables like brussel sprouts, broccoli, green beans, zucchini, butternut squash, cauliflower  - Cauliflower Mash (steam or roast cauliflower, puree w/ low fat cheese, dash of fat free milk and 2-3 sprays of I cant believe its not butter spray. Add garlic powder and black pepper to season). Use Low sodium broth instead of gravy.   - Try Loaded Cauliflower Mash (Make cauliflower like above cauliflower mash. Top with diced turkey sandoval, ¼ cup low fat cheddar cheese and bake @ 350* F for 5-10 minutes, until cheese is melted. Top with minced chives, black pepper and garlic to taste).   - Homemade cranberry sauce using Splenda or another alternative sweetener. Boil fresh cranberries and add splenda to taste. Boil until cranberries break open and then simmer until it reaches the consistency you want (less time for more watery sauce and simmer for longer to create a thicker sauce).   - Deviled eggs: make using low fat chatman, mustard, DILL relish (not sweet relish).   - Vegetable tray w/ Greek yogurt Ranch Dip. Mix 1 packet of hidden [place] ranch dip mix w/ 16 oz low fat plain greek yogurt.     Good Holiday Dessert Options:  - High protein Pumpkin Cheesecake (see recipe below)  - Pumpkin Whip (see recipe below)  - Quest Apple Pie or Cinnamon Roll flavored protein bar (warm in microwave for 10-15 seconds)  - Eggnog Protein shake (see recipe below)  - Fresh fruit w/ low fat cheese  - Sugar-free Jello Parfaits. Layer Red and Green sugar-free jello in cups and top w/ 2 tbsp Sugar-free cool-whip    Pumpkin Cheesecake    8 ounces fat free cream cheese, softened   2 scoops of vanilla protein powder (<4 g sugar per serving)   ¼ tsp Fine salt   2 eggs, at room temperature   1/3 cup fat free sour cream  1/3 cup fat free half and  half  1 15 -ounce can pure pumpkin puree   1 tablespoon pumpkin pie spice, plus more for dusting   Unsalted nuts, crushed  *Add splenda to taste    Directions     1. Preheat the oven to 300 degrees F. Line 18 muffin cups with paper liners. Sprinkle 1 tsp crushed unsalted nuts at the bottom of each of muffin cup liner.     2. In a large bowl, beat the cream cheese, vanilla protein powder and 1/4 teaspoon fine salt on medium-high speed until smooth and creamy, 2 to 3 minutes. Scrape down the sides, reduce speed to low and beat in the eggs, 1 at a time, until combined. Beat in 1/3 cup fat free sour cream and fat free half and half. Stir in the pumpkin puree and pumpkin pie spice until smooth. Divide evenly among cookie-lined paper cups, filling almost all the way to the top.     3. Bake until the filling is just set, 40 to 45 minutes. A sharp knife inserted into the center will come out moist, but clean. Cool completely in tins on a wire rack. Refrigerate until cold, 4 hours, or overnight. Top with a dusting of pumpkin pie spice.    Recipe altered from the following recipe: [email or web address]-cheesecakes-recipe.[email or web address]    Pumpkin Whip    Box of sugar-free vanilla pudding  Can of pumpkin puree  Pumpkin Pie spice (sprinkle to taste)  ½ cup of sugar-free Cool Whip    Directions:  Make sugar-free pudding according to package directions using fat free or 1% milk. Stir in pumpkin and cool whip. Add pumpkin pie spice to taste.     Egg Nog Protein shake    8 oz skim or 1% milk  1 scoop vanilla protein powder  1 tbsp sugar-free vanilla pudding mix  ½ tsp butter flavor extract  ½ tsp rum extract  ½ tsp cinnamon     Shake together or blend with ice and serve.     Sugar-Free Mulled Cider    3 oz diet cran-apple juice  6 oz water  1 packet sugar-free apple cider mix  ½ tsp apple pie spice  ½ tsp butter flavor extract  1 tbsp Sugar-free Syrup    Mix together. Warm if  needed and serve w/ orange wedge and cinnamon stick.       Eating well to be healthy and lose weight does not have to be hard. It also does not have to be time consuming or expensive. There a lots of ways you can work in healthy choices into your day. Many of these are easy, quick and even family friendly!    Homemade hazelnut au lait  Brew your favorite brand of hazelnut flavored coffee (Community makes a good one). Microwave 1/2 cup of milk that fits your eating plan (whole, skim or sugar-free almond milk can all work). Add half to 1 oz sugar free hazelnut syrup.     Quick and easy breakfast  1-2 boiled eggs or mini-frittatas with a tangerine. The boiled eggs and mini-frittatas can both be made ahead and last for up to 4 days in the refrigerator. Bonus if you portion them out in ready to go containers or zipper bags.     Breakfast Egg Muffins with Sandoval and Spinach  Makes 12 muffins  Ingredients    6 eggs  ¼ cup milk  ¼ teaspoon salt  2 cups grated cheddar cheese  3/4 cup spinach, cooked and drained (about 8 oz fresh spinach)  6 sandoval slices, cooked, drained of fat, and chopped  1/2 cup grated Parmesan cheese (optional)    Instructions      Preheat oven to 350 degrees. Use a regular 12-cup muffin pan. Spray the muffin pan with non-stick cooking spray.  In a large bowl, beat eggs until smooth. Add milk, salt, Cheddar cheese and mix. Stir spinach, cooked sandoval into the egg mixture. Ladle the egg mixture into greased muffin cups ¾ full.  Top each muffin cup with grated Parmesan cheese.  Bake for 25 minutes. Remove from the oven, let the muffins cool for 30 minutes before removing them from the pan.      Be a brown bagger! When you make dinner, plan for an extra helping. When you serve your plate for dinner, serve an additional helping into a container that you can take with you the next day. If you don't have a refrigerator available during the day, an insulated lunch bag and ice packs will help you safely store you  lunch.     Cold Brewed Iced tea. Fill a pitcher with 64 oz filtered water. Add either 4 regular tea bags of your choice or a large iced tea bag. Refrigerate over night then remove the tea bags. The tea will not be bitter and is super flavorful. Get creative! Try combinations like green tea and hibiscus tea or black tea with lemon zinger. Add orange or lemon slices for even more flavor.     Snack wisely. Protein filled snacks will fill you up, allowing you to get by with fewer calories. String cheese, pork skins (chicharrones), turkey pepperoni, or celery with cream cheese will all fit the bill.       Ditch the take out. Turkey tacos (with or without a low carb tortilla), burgers (without the bun), or fun stir fries are all quick and easy. The whole family will be happy, and you can save calories and money.      Orange Chicken Stir montano with asparagus   Makes 6 servings  Ingredients:    1.5 lbs boneless skinless chicken breast/tenders, diced into 1-inch pieces  1 Tbsp extra virgin olive or avocado oil, divided  2 lb asparagus, end portions trimmed and remainder diced into 1 1/2-inch pieces  1 small yellow onion, sliced into thin strips  8 oz button mushrooms, sliced  1 Tbsp peeled and finely grated fresh oneil  4 cloves garlic, minced  1/2 cup low-sodium chicken broth  Juice of 2 fresh oranges  2 Tbsp low sodium soy sauce  2 Tbsp cornstarch  Sea salt and freshly ground black pepper    Directions:    In a 12-inch non-stick wok, heat 1/2 oil over moderately high heat. Once oil is hot, add diced chicken and season lightly with salt and pepper. Sauté until cooked through, tossing occasionally, about 5-6 minutes.  Place chicken on a large plate and set aside. Return wok, reduce to medium-high heat, add remaining oil.  Once oil is hot, add asparagus, yellow onion and mushrooms, and sauté until tender-crisp, about 4 - 5 minutes, adding in garlic and oneil during the last 1 minute of sautéing.  Meanwhile, in a mixing bowl  whisk together chicken broth, orange juice, soy sauce and cornstarch until well blended.  Pour chicken broth mixture into skillet with veggies, season with salt and pepper to taste, and bring mixture to a light boil, stirring constantly. Allow mixture to gently boil, stirring constantly, until thickened, about 1 minute.  Toss chicken into mixture and serve immediately over cauliflower rice or Shirataki noodles.      Skinny Chicken Tortilla Soup  Makes 7 servings    2 teaspoons olive oil  1 cup onion, chopped (about 1 small)  2 cups celery, sliced (about 4 medium stalks)  4 garlic cloves, minced  4 medium tomatoes, chopped  2 cups water  4 cups low-sodium organic chicken broth  3 cups chopped and/or shredded rotisserie chicken, skinless  2 cups sliced carrots (about 3 medium)  1 teaspoon dried oregano leaves  2 teaspoons chili powder  1 teaspoon garlic powder  2 teaspoons cumin  ½ teaspoon cayenne pepper (add less or omit, if you don't want a spicy soup)  ½ teaspoon sea salt + more to taste  ½ teaspoon pepper + more to taste    Directions:   Put all ingredients into a large crock pot. Cook on low for 5-6 hours.     Optional garnish with chopped avocado, chopped fresh cilantro, crumbled Cotija cheese, sour cream, Greek yogurt, your favorite hot sauce.           Vegan Avocado Banana Chocolate Pudding  Makes 4 servings  Ingredients    1 1/2 ripe avocados  2 ripe bananas  6 tbsp raw cacao powder or unsweetened cocoa powder  2-3 tbsp maple syrup (or calorie free sweetener)  1/4 cup almond milk  Instructions    Blend everything together in a  until the consistency is smooth and velvety. Taste and see if more sweetener is needed and stir to make sure everything is evenly mixed. Blend a second time if needed.  Top with banana slices, raw cacao nibs, almond butter, or any other toppings and enjoy!

## 2017-10-26 NOTE — PROGRESS NOTES
"Subjective:       Patient ID: Anat Parks is a 37 y.o. female.    Chief Complaint: No chief complaint on file.    CC: "I don't like the way this feels anymore" (gesturing toward abd)    Current attempts at weight loss: New pt to me, referred by Jenn Dickinson NP, with Patient Active Problem List:     Dyspepsia     Gastritis     CHRISTINE (generalized anxiety disorder)- seeing psych. Note reviewed- Medications:  -Wellbutrin  mg po qAM for depression and off-label CHRISTINE  -Continue Lunesta 3 mg Po q HS prn insomnia; will taper down when able  -Increase Elavil to 25 mg po q AM and 50 mg po q HS for adjunctive depression, anxiety and IBS     Panic disorder     MDD (major depressive disorder), recurrent episode     IBS (irritable bowel syndrome)     Psychophysiological insomnia     Obesity (BMI 30.0-34.9)     Family history of colon cancer     Family history of cervical cancer       Does some yard work and walks her dog. No exercise.  Avoids sweets.     Previous diet attempts: paleo, gluten-free, atkins.     History of medication for loss: a few years ago in [place]. Took something that made her hungry.     Heaviest weight: 257    Lightest weight: 180#    Goal weight: 220#    Typical eating patterns: Works for .  with 2 sons at home.   Breakfast: [employer] granola bar and piece of toast. Eggs and sandoval and toast. Weekends: similar.     Lunch: yogurt. Roast beef sandwich with cheese and chips. Weekends: similar    Dinner: corn, potatoes, steak and yellow rice.     Snacks: cheese stick, yogurt, granola bars, pork rinds, cheese and crackers.     Beverages:.water, crystal light.     Willingness to change: 10/10    EKG:Normal sinus rhythm  Within normal limits    BMR: 1897        Review of Systems   Constitutional: Negative for chills and fever.   Respiratory: Negative for shortness of breath.         + Snores   Cardiovascular: Positive for leg swelling. Negative for chest pain. " "  Gastrointestinal: Positive for constipation. Negative for diarrhea.        + GERD   Genitourinary: Negative for difficulty urinating and dysuria.        S/p hyst   Musculoskeletal: Positive for arthralgias and back pain.   Neurological: Negative for dizziness and light-headedness.   Psychiatric/Behavioral: Positive for dysphoric mood. The patient is nervous/anxious.         Pt states under control       Objective:     /80   Pulse 104   Ht 5' 8" (1.727 m)   Wt 116.9 kg (257 lb 11.5 oz)   LMP 11/16/2016 (Exact Date)   BMI 39.19 kg/m²     Physical Exam   Constitutional: She is oriented to person, place, and time. She appears well-developed. No distress.   Obese     HENT:   Head: Normocephalic and atraumatic.   Eyes: EOM are normal. Pupils are equal, round, and reactive to light. No scleral icterus.   Neck: Normal range of motion. Neck supple. No thyromegaly present.   Cardiovascular: Normal rate and normal heart sounds.  Exam reveals no gallop and no friction rub.    No murmur heard.  Pulmonary/Chest: Effort normal and breath sounds normal. No respiratory distress. She has no wheezes.   Abdominal: Soft. Bowel sounds are normal. She exhibits no distension. There is no tenderness.   Musculoskeletal: Normal range of motion. She exhibits no edema.   Neurological: She is alert and oriented to person, place, and time. No cranial nerve deficit.   Skin: Skin is warm and dry. No erythema.   Psychiatric: She has a normal mood and affect. Her behavior is normal. Judgment normal.   Vitals reviewed.      Assessment:       1. Severe obesity (BMI 35.0-39.9) with comorbidity    2. CHRISTINE (generalized anxiety disorder)    3. Gastroesophageal reflux disease, esophagitis presence not specified        Plan:         1. Severe obesity (BMI 35.0-39.9) with comorbidity  Healthy all day and holiday tips and Nutrition materials provided today.    3 meals a day made up of the following:  Unlimited green vegetables, tomatoes, mushrooms, " spaghetti squash, cauliflower, meat, poultry, seafood, eggs and hard cheeses.   Milk and plain yogurt  Dressings, seasonings, condiments, etc should have less than 2 g sugars.   beans or nuts can have 1 x a day.   1-2 servings of citrus fruits, berries, pineapple or melon a day (1/2 cup)  Avoid fried foods    No grains, rice, pasta, potatoes, bread, corn, peas, oatmeal, grits, tortillas, crackers, chips    No soda, sweet tea, juices or lemonade    [email or web address] for recipes. Moderate carb intake      2. CHRISTINE (generalized anxiety disorder)  Surgery not discussed today as she has just had some med changes.     3. Gastroesophageal reflux disease, esophagitis presence not specified  Expect improvement with weight loss  Attempt to wean PPI once 10% TBW lost.        Patient was informed that topiramate is used for migraine prevention and seizures. Weight loss is a common side effect that is well documented. She understands this. She was informed of the potential side effects such as serious and possibly fatal rash in which case the medication should be discontinued immediately. Paresthesias, forgetfulness, fatigue, kidney stones, GI symptoms, and changes in lab values such as electrolytes, blood counts and kidney function.    Start topiramate  in the evening for 1 week, then morning and evening.

## 2017-10-31 ENCOUNTER — OFFICE VISIT (OUTPATIENT)
Dept: PSYCHIATRY | Facility: CLINIC | Age: 37
End: 2017-10-31
Payer: OTHER GOVERNMENT

## 2017-10-31 ENCOUNTER — LAB VISIT (OUTPATIENT)
Dept: LAB | Facility: HOSPITAL | Age: 37
End: 2017-10-31
Attending: NURSE PRACTITIONER
Payer: OTHER GOVERNMENT

## 2017-10-31 VITALS
RESPIRATION RATE: 18 BRPM | BODY MASS INDEX: 38.56 KG/M2 | HEART RATE: 107 BPM | WEIGHT: 254.44 LBS | SYSTOLIC BLOOD PRESSURE: 128 MMHG | HEIGHT: 68 IN | DIASTOLIC BLOOD PRESSURE: 91 MMHG

## 2017-10-31 DIAGNOSIS — K58.1 IRRITABLE BOWEL SYNDROME WITH CONSTIPATION: ICD-10-CM

## 2017-10-31 DIAGNOSIS — E66.9 OBESITY (BMI 30.0-34.9): ICD-10-CM

## 2017-10-31 DIAGNOSIS — F33.9 EPISODE OF RECURRENT MAJOR DEPRESSIVE DISORDER, UNSPECIFIED DEPRESSION EPISODE SEVERITY: ICD-10-CM

## 2017-10-31 DIAGNOSIS — F41.0 PANIC DISORDER: ICD-10-CM

## 2017-10-31 DIAGNOSIS — F41.1 GAD (GENERALIZED ANXIETY DISORDER): Primary | ICD-10-CM

## 2017-10-31 LAB
ALBUMIN SERPL BCP-MCNC: 3.8 G/DL
ALP SERPL-CCNC: 68 U/L
ALT SERPL W/O P-5'-P-CCNC: 48 U/L
ANION GAP SERPL CALC-SCNC: 8 MMOL/L
AST SERPL-CCNC: 22 U/L
BASOPHILS # BLD AUTO: 0.03 K/UL
BASOPHILS NFR BLD: 0.4 %
BILIRUB SERPL-MCNC: 0.4 MG/DL
BUN SERPL-MCNC: 11 MG/DL
CALCIUM SERPL-MCNC: 9.4 MG/DL
CHLORIDE SERPL-SCNC: 107 MMOL/L
CO2 SERPL-SCNC: 23 MMOL/L
CREAT SERPL-MCNC: 1 MG/DL
DIFFERENTIAL METHOD: ABNORMAL
EOSINOPHIL # BLD AUTO: 0.1 K/UL
EOSINOPHIL NFR BLD: 1.4 %
ERYTHROCYTE [DISTWIDTH] IN BLOOD BY AUTOMATED COUNT: 13.8 %
EST. GFR  (AFRICAN AMERICAN): >60 ML/MIN/1.73 M^2
EST. GFR  (NON AFRICAN AMERICAN): >60 ML/MIN/1.73 M^2
GLUCOSE SERPL-MCNC: 97 MG/DL
HCT VFR BLD AUTO: 43.1 %
HGB BLD-MCNC: 13.8 G/DL
LYMPHOCYTES # BLD AUTO: 2 K/UL
LYMPHOCYTES NFR BLD: 27.7 %
MCH RBC QN AUTO: 26.7 PG
MCHC RBC AUTO-ENTMCNC: 32 G/DL
MCV RBC AUTO: 83 FL
MONOCYTES # BLD AUTO: 0.7 K/UL
MONOCYTES NFR BLD: 9.1 %
NEUTROPHILS # BLD AUTO: 4.5 K/UL
NEUTROPHILS NFR BLD: 61.4 %
PLATELET # BLD AUTO: 334 K/UL
PMV BLD AUTO: 9.9 FL
POTASSIUM SERPL-SCNC: 4.4 MMOL/L
PROT SERPL-MCNC: 7.4 G/DL
RBC # BLD AUTO: 5.17 M/UL
SODIUM SERPL-SCNC: 138 MMOL/L
WBC # BLD AUTO: 7.28 K/UL

## 2017-10-31 PROCEDURE — 36415 COLL VENOUS BLD VENIPUNCTURE: CPT

## 2017-10-31 PROCEDURE — 99999 PR PBB SHADOW E&M-EST. PATIENT-LVL III: CPT | Mod: PBBFAC,,, | Performed by: PSYCHIATRY & NEUROLOGY

## 2017-10-31 PROCEDURE — 99214 OFFICE O/P EST MOD 30 MIN: CPT | Mod: S$PBB,,, | Performed by: PSYCHIATRY & NEUROLOGY

## 2017-10-31 PROCEDURE — 85025 COMPLETE CBC W/AUTO DIFF WBC: CPT

## 2017-10-31 PROCEDURE — 80053 COMPREHEN METABOLIC PANEL: CPT

## 2017-10-31 PROCEDURE — 99213 OFFICE O/P EST LOW 20 MIN: CPT | Mod: PBBFAC | Performed by: PSYCHIATRY & NEUROLOGY

## 2017-10-31 RX ORDER — FLUCONAZOLE 200 MG/1
200 TABLET ORAL ONCE
Status: ON HOLD | COMMUNITY
Start: 2017-10-15 | End: 2018-11-08

## 2017-10-31 NOTE — PROGRESS NOTES
"Outpatient Psychiatry Follow-Up Visit (MD/NP)    10/31/2017    Clinical Status of Patient:  Outpatient (Ambulatory)    Chief Complaint:  Anat Parks is a 37 y.o. female who presents today for follow-up of depression and anxiety.  Met with patient and spouse.      Interval History and Content of Current Session:  Interim Events/Subjective Report/Content of Current Session:   The patient was seen and her chart reviewed.    She has been compliant with treatment. She had no adverse reactions or side effects. Vistaril was minimally effective in controlling a panic attack    A close friend of hers committed suicide in 7-8/2017. Her marriage is going well. She continues to deal with family struggles (balancing work/home/life responsibilities), but she is handling them better. Her  had a successful tx with the spinal stimulator for his chronic pain (looking to get the permanent one placed).     She is pursuing gastric bypass surgery- she recently met with a bariatric doctor and was started on Topamax; she is working on dietary and exercise changes.     She is in physical therapy for lower back pain, which has been helpful. She had no other medical issues since last seen. Her heartburn has improved.     "I'm doing better.. Ok today"      Improved/Controlled Symptoms of Depression: no diminished mood, no loss of interest/anhedonia; no irritability, no diminished energy, no change in sleep (see below; improved overall), no change in appetite, no diminished concentration or cognition or indecisiveness, no PMA/R, no excessive guilt, denied hopelessness/worthlessness, no suicidal ideations    Controlled Sleep disturbance: no trouble with initiation (relieved with medications- may happen occasionally; may be getting some tolerance to the lunesta), no issues maintenance, no early morning awakening with inability to return to sleep; getting about 8-12 hours per night on average. She still requires the lunesta about 4 " times per week.     No Suicidal/Homicidal ideations: no active/passive ideations, organized plans, or future intentions; no past attempts.     Denied Symptoms of CHRISTINE: no excessive anxiety/worry/fear, no restlessness, no fatigue, no poor concentration, no irritability, no muscle tension, no sleep disturbance     Continued but less symptoms of Panic Disorder: she averages about 1 per week (was at 3-4 panic attacks per week) since the last appointment (may be precipitated or unprecipitated); Vistaril 100 mg was ineffective at controlling symptoms.      No Symptoms of sexual disorders: no decrease in libido/desire (better); no issues and orgasmic (better); no pain    Overall, her IBS persists; Back pain is better.     Previous medication trials include luvox (increased irritability)    Review of Systems   · PSYCHIATRIC: Pertinant items are noted in the narrative.  · CONSTITUTIONAL: No weight gain or loss.   · MUSCULOSKELETAL: No pain or stiffness of the joints.  · NEUROLOGIC: No weakness, sensory changes, seizures, confusion, memory loss, tremor or other abnormal movements.  · ENDOCRINE: No polydipsia or polyuria.  · INTEGUMENTARY: No rashes or lacerations.  · EYES: No exophthalmos, jaundice or blindness.  · ENT: No dizziness, tinnitus or hearing loss.  · RESPIRATORY: No shortness of breath.  · CARDIOVASCULAR: No tachycardia or chest pain.  · GASTROINTESTINAL: No nausea, vomiting, pain, constipation or diarrhea.  · GENITOURINARY: No frequency, dysuria or sexual dysfunction.  · HEMATOLOGIC/LYMPHATIC: No excessive bleeding, prolonged or excessive bleeding after dental extraction/injury.  · ALLERGIC/IMMUNOLOGIC: No allergic response to materials, foods or animals at this time.    Past Medical, Family and Social History: The patient's past medical, family and social history have been reviewed and updated as appropriate within the electronic medical record - see encounter notes.    Compliance: yes    Side effects:  "None    Risk Parameters:  Patient reports no suicidal ideation  Patient reports no homicidal ideation  Patient reports no self-injurious behavior  Patient reports no violent behavior           Exam (detailed: at least 9 elements; comprehensive: all 15 elements)   Constitutional  Vitals:  Most recent vital signs, dated less than 90 days prior to this appointment, were reviewed.   Vitals:    10/31/17 1213   BP: (!) 128/91   Pulse: 107   Resp: 18   Weight: 115.4 kg (254 lb 6.6 oz)   Height: 5' 8" (1.727 m)     Body mass index is 38.68 kg/m².         General:  unremarkable, age appropriate, casually dressed, neatly groomed, obese     Musculoskeletal  Muscle Strength/Tone:  no spasicity, no rigidity, no dyskinesia, no dystonia, no tremor   Gait & Station:  non-ataxic     Psychiatric  Speech:  no latency; no press, spontaneous   Mood & Affect:  steady "ok"   congruent and appropriate, mood-congruent, anxious   Thought Process:  normal and logical, goal-directed   Associations:  intact   Thought Content:  normal, no suicidality, no homicidality, delusions, or paranoia   Insight:  intact, has awareness of illness   Judgement: behavior is adequate to circumstances, age appropriate   Orientation:  person, place, situation, time/date, day of week, month of year, year   Memory: intact for content of interview, able to remember recent events- yes, able to remember remote events- yes   Language: grossly intact, able to name, able to repeat   Attention Span & Concentration:  able to focus, completed tasks   Fund of Knowledge:  intact and appropriate to age and level of education, familiar with aspects of current personal life, 4 of 4 recent presidents     Assessment and Diagnosis   Status/Progress: Based on the examination today, the patient's problem(s) is/are improved and inadequately controlled.  New problems have not been presented today.   Co-morbidities are not complicating management of the primary condition.  There are no " active rule-out diagnoses for this patient at this time.     General Impression:  MDD, recurrent, moderate, in full remission  CHRISTINE  Panic Disorder without agoraphobia   Insomnia    IBS, GERD, ovarian cysts, obesity      Intervention/Counseling/Treatment Plan   Treatment Plan/Recommendations:     Medications:  -Wellbutrin  mg po qAM for depression and off-label CHRISTINE; pt declined a trial of 450 mg daily  -Lunesta 3 mg Po q HS prn insomnia; will taper down when able  -Change Elavil to 75 mg po q HS for adjunctive depression, anxiety and IBS; pt declined a trial of 100 mg nightly    -stop vistaril- ineffective  -Pt cannot have a trial of sedative hypnotics secondary to job restrictions    -Discussed naturopathic techniques- pt would like to try lavender extract    Pt started on Topamax 50 mg po BID for off-label mood/anxiety and weightloss    -Continue Probiotic for adjunctive depression     Discussed diagnosis, risks and benefits of proposed treatment vs alternative treatments vs no treatment, and potential side effects of these treatments.  The patient expresses understanding of the above and displays the capacity to agree with this treatment given said understanding.  Patient also agrees that, currently, the benefits outweigh the risks and would like to pursue treatment at this time.    Therapy:  Pt is not interested at this time      Counseled:  Counseled on illness and tx options as well as exercise for MH   Counseled on weight loss- goal is to lose 12 lbs by next appointment (goal weight 242 lbs)    Labs:   Labs reviewed        Return to Clinic: 1 month, sooner if needed     Yuriy Vaz MD

## 2017-11-09 ENCOUNTER — OFFICE VISIT (OUTPATIENT)
Dept: INTERNAL MEDICINE | Facility: CLINIC | Age: 37
End: 2017-11-09
Payer: OTHER GOVERNMENT

## 2017-11-09 VITALS
DIASTOLIC BLOOD PRESSURE: 72 MMHG | WEIGHT: 212.5 LBS | SYSTOLIC BLOOD PRESSURE: 100 MMHG | BODY MASS INDEX: 32.21 KG/M2 | RESPIRATION RATE: 16 BRPM | HEART RATE: 100 BPM | HEIGHT: 68 IN

## 2017-11-09 DIAGNOSIS — F41.1 GAD (GENERALIZED ANXIETY DISORDER): ICD-10-CM

## 2017-11-09 DIAGNOSIS — E66.9 OBESITY (BMI 30.0-34.9): ICD-10-CM

## 2017-11-09 DIAGNOSIS — Z00.00 HEALTH CARE MAINTENANCE: ICD-10-CM

## 2017-11-09 DIAGNOSIS — R10.13 DYSPEPSIA: Primary | ICD-10-CM

## 2017-11-09 DIAGNOSIS — Z13.29 SCREENING FOR HYPOTHYROIDISM: ICD-10-CM

## 2017-11-09 DIAGNOSIS — K58.8 OTHER IRRITABLE BOWEL SYNDROME: ICD-10-CM

## 2017-11-09 DIAGNOSIS — F33.9 EPISODE OF RECURRENT MAJOR DEPRESSIVE DISORDER, UNSPECIFIED DEPRESSION EPISODE SEVERITY: ICD-10-CM

## 2017-11-09 DIAGNOSIS — Z13.220 SCREENING FOR HYPERLIPIDEMIA: ICD-10-CM

## 2017-11-09 DIAGNOSIS — K29.70 GASTRITIS, PRESENCE OF BLEEDING UNSPECIFIED, UNSPECIFIED CHRONICITY, UNSPECIFIED GASTRITIS TYPE: ICD-10-CM

## 2017-11-09 PROCEDURE — 99213 OFFICE O/P EST LOW 20 MIN: CPT | Mod: PBBFAC | Performed by: NURSE PRACTITIONER

## 2017-11-09 PROCEDURE — 99999 PR PBB SHADOW E&M-EST. PATIENT-LVL III: CPT | Mod: PBBFAC,,, | Performed by: NURSE PRACTITIONER

## 2017-11-09 PROCEDURE — 90688 IIV4 VACCINE SPLT 0.5 ML IM: CPT | Mod: PBBFAC

## 2017-11-09 PROCEDURE — 99214 OFFICE O/P EST MOD 30 MIN: CPT | Mod: S$PBB,,, | Performed by: NURSE PRACTITIONER

## 2017-11-09 NOTE — PROGRESS NOTES
Subjective:       Patient ID: Anat Parks is a 37 y.o. female.    Chief Complaint: Follow-up ( 6month )    HPI: Pt presents to clinic today known to me and Mary. She reports that she is still seeing pain management (has been d/michael), psych- still seeing Dr Vaz, and PT- has been d/michael. She did do her cbc and cmp. Last lipid and tsh was 4/2017- WNL    She also has seen bariatrics and was put on topamax and also started on a diet. She has f/u 1/2/2017     Also sees Dr Diaz for IBS- sees as needed    Lab Results   Component Value Date    ALT 48 (H) 10/31/2017    AST 22 10/31/2017    ALKPHOS 68 10/31/2017    BILITOT 0.4 10/31/2017     Fatty liver. She is trying to loose wt  Review of Systems   Constitutional: Negative for activity change and unexpected weight change.   HENT: Negative for hearing loss, rhinorrhea and trouble swallowing.    Eyes: Negative for discharge and visual disturbance.   Respiratory: Negative for chest tightness and wheezing.    Cardiovascular: Negative for chest pain and palpitations.   Gastrointestinal: Negative for constipation, diarrhea and vomiting.   Endocrine: Negative for polydipsia and polyuria.   Genitourinary: Negative for difficulty urinating, dysuria, hematuria and menstrual problem.   Musculoskeletal: Negative for arthralgias, joint swelling and neck pain.   Neurological: Negative for weakness and headaches.   Psychiatric/Behavioral: Negative for confusion and dysphoric mood.       Objective:      Physical Exam   Constitutional: She is oriented to person, place, and time. She appears well-developed and well-nourished.   HENT:   Head: Normocephalic and atraumatic.   Nose: Nose normal.   Mouth/Throat: Oropharynx is clear and moist.   Bilateral fluid   Eyes: Conjunctivae and EOM are normal. Pupils are equal, round, and reactive to light.   Neck: Normal range of motion. Neck supple. No thyromegaly present.   Cardiovascular: Normal rate, regular rhythm, normal heart sounds and  intact distal pulses.    No murmur heard.  Pulmonary/Chest: Effort normal and breath sounds normal. No respiratory distress. She has no wheezes. She has no rales.   Abdominal: Soft. Bowel sounds are normal. She exhibits no distension and no mass. There is no tenderness. There is no guarding.   Musculoskeletal: Normal range of motion.   Lymphadenopathy:     She has no cervical adenopathy.   Neurological: She is alert and oriented to person, place, and time.   Skin: Skin is warm and dry. Capillary refill takes less than 2 seconds.   Psychiatric: She has a normal mood and affect. Her behavior is normal. Judgment and thought content normal.   Nursing note and vitals reviewed.      Assessment:       1. Dyspepsia    2. Gastritis, presence of bleeding unspecified, unspecified chronicity, unspecified gastritis type    3. CHRISTINE (generalized anxiety disorder)    4. Episode of recurrent major depressive disorder, unspecified depression episode severity    5. Other irritable bowel syndrome    6. Obesity (BMI 30.0-34.9)        Plan:   nAat was seen today for follow-up.    Diagnoses and all orders for this visit:    Dyspepsia    Gastritis, presence of bleeding unspecified, unspecified chronicity, unspecified gastritis type    CHRISTINE (generalized anxiety disorder)    Episode of recurrent major depressive disorder, unspecified depression episode severity    Other irritable bowel syndrome    Obesity (BMI 30.0-34.9)    Cont topamax, I can not fill this as it is off lable. She will cont to have her MDs write for this  Cont amitiza, prilosec and bentyl per GI for IBS  Cont eszopiclone for insomnia and wellbutrim from psych  Cont elavil for chronic pain and psych- I can rx this for her or Dr Vaz as she is no longer seeing pain management    All labs 4/2018

## 2017-11-15 ENCOUNTER — TELEPHONE (OUTPATIENT)
Dept: INTERNAL MEDICINE | Facility: CLINIC | Age: 37
End: 2017-11-15

## 2017-11-15 RX ORDER — AMITRIPTYLINE HYDROCHLORIDE 25 MG/1
25 TABLET, FILM COATED ORAL DAILY
Qty: 30 TABLET | Refills: 0 | Status: SHIPPED | OUTPATIENT
Start: 2017-11-15 | End: 2017-11-17 | Stop reason: SDUPTHER

## 2017-11-15 RX ORDER — AMITRIPTYLINE HYDROCHLORIDE 50 MG/1
50 TABLET, FILM COATED ORAL NIGHTLY
Qty: 30 TABLET | Refills: 0 | Status: SHIPPED | OUTPATIENT
Start: 2017-11-15 | End: 2017-11-17 | Stop reason: SDUPTHER

## 2017-11-15 NOTE — TELEPHONE ENCOUNTER
Ok. Pt still taking bentyl and amitiza. She is no longer taking the amrix prescribed by Dr Quintero. She would like to restart taking the lorzone 750mg bc this helped her with no adverse rx. She stopped the amrix d/t dry mouth. She is also requesting refills on amitriptyline 25mg and 50mg.

## 2017-11-15 NOTE — TELEPHONE ENCOUNTER
What has she done in the past? I see that she is on Amitiza/bentyl/prilosec. Is she still taking this? She can make a f/u appt to cody

## 2017-11-15 NOTE — TELEPHONE ENCOUNTER
Refilled her elavil. lorzone is a muscle relaxer. FDA has not indicated its use in IBS. She may have used it off label but I am not able to prescribe meds off label

## 2017-11-15 NOTE — TELEPHONE ENCOUNTER
Spoke with pt. Informed her of inability to send in lorzone. She understood. I told her to give the new meds an adjustment period. She may also just be having an IBS flare up causing the side pain. She will give it a week or so and if no relief, she will call to schedule an appt so we can get to the bottom of it and take the next appropriate steps.

## 2017-11-15 NOTE — TELEPHONE ENCOUNTER
----- Message from Waleska Bishop sent at 11/15/2017  9:21 AM CST -----  Contact: Pt  Anat Parks  MRN: [id number]  : 1980  PCP: Azalia Muir  Home Phone      [phone]  Work Phone      Not on file.  Mobile          [phone]      MESSAGE:  Pt recently stopped seeing pain management and her IBS symptoms are coming back, she's getting pain her side. Wants to know what Azalia wants to do about this. Says her digestive health doctor can't see her for 6 months. Please advise.    PHONE:  [phone]

## 2017-11-17 ENCOUNTER — TELEPHONE (OUTPATIENT)
Dept: INTERNAL MEDICINE | Facility: CLINIC | Age: 37
End: 2017-11-17

## 2017-11-17 NOTE — TELEPHONE ENCOUNTER
----- Message from Kaila Hanson sent at 2017  9:46 AM CST -----  Contact: self  Anat Parks  MRN: [id number]  : 1980  PCP: Azalia Muir  Home Phone      [phone]  Work Phone      Not on file.  [employer]          [phone]      MESSAGE: patient calling stating her medication was sent to the wrong pharmacy/ she only uses [facility] if the medication is an antibiotic   Phone [phone]  Pharmacy - [facility]

## 2017-11-17 NOTE — TELEPHONE ENCOUNTER
Pt requesting amitriptyline (ELAVIL) 25 MG tablet and amitriptyline (ELAVIL) 50 MG tablet to be sent to [facility]. Requesting 90 day supply.   Please advise.

## 2017-11-20 RX ORDER — AMITRIPTYLINE HYDROCHLORIDE 50 MG/1
50 TABLET, FILM COATED ORAL NIGHTLY
Qty: 90 TABLET | Refills: 0 | Status: SHIPPED | OUTPATIENT
Start: 2017-11-20 | End: 2018-02-07 | Stop reason: SDUPTHER

## 2017-11-20 RX ORDER — TOPIRAMATE 50 MG/1
50 TABLET, FILM COATED ORAL 2 TIMES DAILY
Qty: 60 TABLET | Refills: 2 | Status: SHIPPED | OUTPATIENT
Start: 2017-11-20 | End: 2018-01-22 | Stop reason: SDUPTHER

## 2017-11-20 RX ORDER — AMITRIPTYLINE HYDROCHLORIDE 25 MG/1
25 TABLET, FILM COATED ORAL DAILY
Qty: 90 TABLET | Refills: 0 | Status: SHIPPED | OUTPATIENT
Start: 2017-11-20 | End: 2018-02-07 | Stop reason: SDUPTHER

## 2017-11-29 DIAGNOSIS — K58.1 IRRITABLE BOWEL SYNDROME WITH CONSTIPATION: ICD-10-CM

## 2017-11-29 RX ORDER — LUBIPROSTONE 8 UG/1
8 CAPSULE ORAL 2 TIMES DAILY WITH MEALS
Qty: 180 CAPSULE | Refills: 1 | Status: SHIPPED | OUTPATIENT
Start: 2017-11-29 | End: 2019-06-05 | Stop reason: SDUPTHER

## 2017-12-12 ENCOUNTER — TELEPHONE (OUTPATIENT)
Dept: INTERNAL MEDICINE | Facility: CLINIC | Age: 37
End: 2017-12-12

## 2017-12-12 NOTE — TELEPHONE ENCOUNTER
----- Message from Zach Marin sent at 2017  9:20 AM CST -----  Contact: SELF  Anat Parks  MRN: [id number]  : 1980  PCP: Azalia Muir  Home Phone      [phone]  Work Phone      Not on file.  Mobile          [phone]      MESSAGE: PT WANTS TO BE SEEN TODAY FOR INJURIES FROM A FALL LAST NIGHT. PT BELIEVES SHE HAS HEAD INJURY. PLEASE CALL     PHONE: [phone]

## 2018-01-22 ENCOUNTER — OFFICE VISIT (OUTPATIENT)
Dept: BARIATRICS | Facility: CLINIC | Age: 38
End: 2018-01-22
Payer: OTHER GOVERNMENT

## 2018-01-22 VITALS
SYSTOLIC BLOOD PRESSURE: 110 MMHG | HEIGHT: 68 IN | DIASTOLIC BLOOD PRESSURE: 60 MMHG | WEIGHT: 242.06 LBS | BODY MASS INDEX: 36.69 KG/M2 | HEART RATE: 100 BPM

## 2018-01-22 DIAGNOSIS — K21.9 GASTROESOPHAGEAL REFLUX DISEASE, ESOPHAGITIS PRESENCE NOT SPECIFIED: ICD-10-CM

## 2018-01-22 DIAGNOSIS — E66.01 SEVERE OBESITY (BMI 35.0-39.9) WITH COMORBIDITY: Primary | ICD-10-CM

## 2018-01-22 DIAGNOSIS — M51.36 BULGING LUMBAR DISC: ICD-10-CM

## 2018-01-22 DIAGNOSIS — K44.9 HIATAL HERNIA: ICD-10-CM

## 2018-01-22 DIAGNOSIS — F41.0 PANIC DISORDER: ICD-10-CM

## 2018-01-22 DIAGNOSIS — F33.9 EPISODE OF RECURRENT MAJOR DEPRESSIVE DISORDER, UNSPECIFIED DEPRESSION EPISODE SEVERITY: ICD-10-CM

## 2018-01-22 PROCEDURE — 99213 OFFICE O/P EST LOW 20 MIN: CPT | Mod: PBBFAC | Performed by: INTERNAL MEDICINE

## 2018-01-22 PROCEDURE — 99999 PR PBB SHADOW E&M-EST. PATIENT-LVL III: CPT | Mod: PBBFAC,,, | Performed by: INTERNAL MEDICINE

## 2018-01-22 PROCEDURE — 99213 OFFICE O/P EST LOW 20 MIN: CPT | Mod: S$PBB,,, | Performed by: INTERNAL MEDICINE

## 2018-01-22 RX ORDER — TOPIRAMATE 50 MG/1
50 TABLET, FILM COATED ORAL 2 TIMES DAILY
Qty: 180 TABLET | Refills: 0 | Status: SHIPPED | OUTPATIENT
Start: 2018-01-22 | End: 2018-04-19 | Stop reason: SDUPTHER

## 2018-01-22 NOTE — PROGRESS NOTES
"Subjective:       Patient ID: Anat Parks is a 37 y.o. female.    Chief Complaint: Follow-up    Pt here today for follow-up. Has lost 17 lbs.  Has been on LCHF diet and topiramate. Denies SE. Has noted some dry mouth, but not sure, if may be wellbutrin. She feels she is doing well. She did have some dietary questions. She has talked to her psychiatrist, and he says will be ok with her proceeding with work up. She has not had any further med changes with him.               Review of Systems   Constitutional: Negative for chills and fever.   Respiratory: Negative for shortness of breath.         + Snores   Cardiovascular: Positive for leg swelling. Negative for chest pain.   Gastrointestinal: Positive for constipation. Negative for diarrhea.        + GERD   Genitourinary: Negative for difficulty urinating and dysuria.        S/p hyst   Musculoskeletal: Positive for arthralgias and back pain.   Neurological: Negative for dizziness and light-headedness.   Psychiatric/Behavioral: Positive for dysphoric mood. The patient is nervous/anxious.         Pt states under control       Objective:     /60   Pulse 100   Ht 5' 8" (1.727 m)   Wt 109.8 kg (242 lb 1 oz)   LMP 11/16/2016 (Exact Date)   BMI 36.81 kg/m²     Physical Exam   Constitutional: She is oriented to person, place, and time. She appears well-developed. No distress.   Obese     HENT:   Head: Normocephalic and atraumatic.   Eyes: EOM are normal. Pupils are equal, round, and reactive to light. No scleral icterus.   Neck: Normal range of motion. Neck supple.   Cardiovascular: Normal rate and normal heart sounds.  Exam reveals no gallop and no friction rub.    No murmur heard.  Pulmonary/Chest: Effort normal and breath sounds normal. No respiratory distress. She has no wheezes.   Musculoskeletal: Normal range of motion. She exhibits no edema.   Neurological: She is alert and oriented to person, place, and time. No cranial nerve deficit.   Skin: Skin is " warm and dry. No erythema.   Psychiatric: She has a normal mood and affect. Her behavior is normal. Judgment normal.   Vitals reviewed.      Assessment:       1. Severe obesity (BMI 35.0-39.9) with comorbidity    2. Gastroesophageal reflux disease, esophagitis presence not specified    3. Hiatal hernia    4. Bulging lumbar disc    5. Episode of recurrent major depressive disorder, unspecified depression episode severity    6. Panic disorder        Plan:         1. Severe obesity (BMI 35.0-39.9) with comorbidity  She is still intereseted in surgery. Seminar info given.    2. Gastroesophageal reflux disease, esophagitis presence not specified  Expect improvement with weight loss  Attempt to wean PPI once 10% TBW lost.      3. Hiatal hernia  If she proceeds to surgery, could possibly have prepared at same time.     4. Bulging lumbar disc  Increase low impact activity as tolerated.  Avoid high impact activity, very heavy lifting or other exercise regimens that may cause discomfort.  Expect improvement with weight loss.     5. Episode of recurrent major depressive disorder, unspecified depression episode severity  Pt has not had med changes in 3 months. Advised that meds will need to be in from that can be crushed/chewed.   Will CC Dr. Vaz.   6. Panic disorder  See #5.     3 meals a day made up of the following:  Unlimited green vegetables, tomatoes, mushrooms, spaghetti squash, cauliflower, meat, poultry, seafood, eggs and hard cheeses.   Milk and plain yogurt  Dressings, seasonings, condiments, etc should have less than 2 g sugars.   beans or nuts can have 1 x a day.   1-2 servings of citrus fruits, berries, pineapple or melon a day (1/2 cup)  Avoid fried foods    No grains, rice, pasta, potatoes, bread, corn, peas, oatmeal, grits, tortillas, crackers, chips    No soda, sweet tea, juices or lemonade    [email or web address].[email or web address] for recipes. Moderate carb intake      Patient was informed that topiramate is used for  migraine prevention and seizures. Weight loss is a common side effect that is well documented. She understands this. She was informed of the potential side effects such as serious and possibly fatal rash in which case the medication should be discontinued immediately. Paresthesias, forgetfulness, fatigue, kidney stones, GI symptoms, and changes in lab values such as electrolytes, blood counts and kidney function.

## 2018-01-22 NOTE — PATIENT INSTRUCTIONS
If you are interested in bariatric surgery we will need you to either attend an in-person seminar or online seminar. If you choose to attend an in-person seminar this is give once a month and you may call [phone] to arrange your appointment. If you choose to view the online seminar please go to: [email or web address] The seminar is best viewed on a desktop or laptop computer. Upon completion of the seminar on the last slide you will see the code word comprised of letters and numbers in red and you should jot them down as youll need them to enter into the required form. On that same page youll see the link which will take you to the form. Please enter all the information required, including the code word. You will receive an email confirmation and so will we. Upon receiving this letter you will be called so that your appointments can be arranged. There is also two links for you to print out two packets of information. One is the patient information packet and the other is the nutrition worksheet. Please complete them and bring to your next appointment in our department.       3 meals a day made up of the following:  Unlimited green vegetables, tomatoes, mushrooms, spaghetti squash, cauliflower, meat, poultry, seafood, eggs and hard cheeses.   Milk and plain yogurt  Dressings, seasonings, condiments, etc should have less than 2 g sugars.   beans or nuts can have 1 x a day.   1-2 servings of citrus fruits, berries, pineapple or melon a day (1/2 cup)  Avoid fried foods    No grains, rice, pasta, potatoes, bread, corn, peas, oatmeal, grits, tortillas, crackers, chips    No soda, sweet tea, juices or lemonade    [email or web address] for recipes. Moderate carb intake      Patient was informed that topiramate is used for migraine prevention and seizures. Weight loss is a common side effect that is well documented. She understands this. She was informed of the potential side effects such as serious and possibly  fatal rash in which case the medication should be discontinued immediately. Paresthesias, forgetfulness, fatigue, kidney stones, GI symptoms, and changes in lab values such as electrolytes, blood counts and kidney function.      Dinner in Under 30 minutes and 400 calories.     Baked Chicken breast with Broccoli Cheese Casserole  2-3 chicken breast (approximately 6-8 oz each)    2 tsp each garlic and herb Mrs. Palumbo seasoning   2 tsp your favorite creole seasoning  2 tablespoons of balsamic vinegar  2 - 10 oz packages of frozen broccoli  1 egg   ½ cup skim milk  ½ tsp paprika   ½ tsp cayenne pepper   1 can fat free cream of mushroom soup.   1 .5 cups of shredded cheddar cheese    Pre-heat oven to 450 degrees. Toss 2-3 chicken breast (approximately 6-8 oz each) in 2 tsp each garlic and herb Mrs. Dash seasoning, 2 tsp your favorite creole seasoning, and 2 tablespoons of balsamic vinegar. This can also be done up to 24 hours ahead of time, and the chicken can be left in the refrigerator to marinate. Place on a baking sheet sprayed with non-stick cooking spray and bake on 450 degrees for 10 min, then reduce heat to 350 degrees and cook an addition 10-15 minutes until chicken is cooked through.   While chicken is baking, microwave safe dish, thaw 2 - 10 oz packages of frozen broccoli. Drain any excess water, season with salt, pepper, all-purpose seasoning of your choice. In another bowl, whisk together 1 egg, ½ cup skim milk, ½ tsp paprika, ½ tsp cayenne pepper and 1 can fat free cream of mushroom soup. Combine broccoli, soup mixture, 1 cup of shredded cheddar cheese in baking dish sprayed with cooking spray. Top with remaining cheese. Bake in the oven with chicken for about 20 min or until set cheese is melted and villegas.     Makes 4 servings. 389 calories per serving.10 g fat, 13 g carbs, 54g protein     Sheet Pan [place] Shrimp  1 pound large shrimp, shelled, peeled and deveined.   2 tablespoon olive oil  The zest and  the juice of 1 lime  ½ tsp chili powder  ½-1 tsp cayenne pepper  1 tsp cumin  ½ tsp dry oregano  1 red bell pepper  1 green bell pepper  1 red onion  2 cups mushrooms, quartered  1 avocado  Whole giuliano lettuce leaves  Preheat oven to 375 degrees.  In a bowl combine shrimp, lime juice, lime zest, cumin, cayenne pepper, chili powder, and a pinch of salt. Set aside.   Slice peppers and onions into thin strips. Toss in 1 tablespoon of olive oil. Sprinkle with salt and pepper and arrange in a single layer over 1/3 of a large sheet pan  Toss mushrooms with remaining olive oil, oregano, salt and pepper. Arrange in single layer on sheet pan. Place sheet pan in oven for about 15-20 minutes until vegetables are softened, cooked about ¾ of the way through. Remove the sheet pan from oven.  Change setting on oven to low broil.  If needed, rearrange vegetables to make room for shrimp. Arrange the shrimp in a single layer on the sheet pan and return pan to oven. After 5 minutes, flip shrimp. Cook 3-5 additional minutes, or until  Shrimp are opaque.   Serve shrimp and cooked vegetables on lettuce leaves with sliced avocado.   Makes 4 servings. Calories per servin; 23g fat, 19 g carbohydrates, 27g protein.    Zoodles Primavera with Seasoned Ricotta  8 cups zucchini noodles. These can be purchased already prepared, or you can make them on your own with whole zucchini and a vegetable spiralizer.   1.5 cups cherry tomatoes, quartered  2 cups sliced mushrooms  1 cup chopped fresh broccoli  1 cup frozen peas and carrots  2 cloves garlic, finely chopped  16 oz part skim ricotta cheese  2 oz Neufchatel cream cream cheese, cut into small cubes  Juice and zest of 1 lemon  1 pinch red pepper flakes    2 tsp Italian seasoning  4-5 leaves fresh basil, thinly sliced  1 tablespoon of olive oil  Parmesan cheese for topping (optional)     In a bowl, combine ricotta cheese, 1 tsp Italian seasoning, ½ teaspoon lemon zest. Season with salt and  pepper to taste. Mix well. Fold in half of fresh basil. Set aside.   Heat a large skillet to medium high. Add olive oil. Sautee mushrooms until starting to become tender. Season them with salt and pepper while cooking.  Add broccoli and peas and carrots. Reduce heat to medium. Cover pan and cook for 4-5 minutes until broccoli is tender and peas and carrots are warmed through. Add Neufchatel cheese, Italian seasoning, red pepper flakes, 1 tsp lemon zest and lemon juice. Stir until a smooth sauce is formed. Add zucchini noodles (zoodles) to skillet and toss in sauce, then add cherry tomatoes.  Separate zoodle and vegetables into 4 equal portions. Serve each with a ¼ cup serving of seasoned ricotta cheese. Sprinkle with grated parmesan cheese or fresh basil,  if desired.   Makes 4 servings. Calories per servin calories, 32 g carbs, 33 g protein, 18 g fat

## 2018-02-07 ENCOUNTER — OFFICE VISIT (OUTPATIENT)
Dept: PSYCHIATRY | Facility: CLINIC | Age: 38
End: 2018-02-07
Payer: OTHER GOVERNMENT

## 2018-02-07 VITALS
DIASTOLIC BLOOD PRESSURE: 84 MMHG | RESPIRATION RATE: 19 BRPM | WEIGHT: 240.5 LBS | BODY MASS INDEX: 36.45 KG/M2 | HEIGHT: 68 IN | HEART RATE: 86 BPM | SYSTOLIC BLOOD PRESSURE: 116 MMHG

## 2018-02-07 DIAGNOSIS — F41.0 PANIC DISORDER: ICD-10-CM

## 2018-02-07 DIAGNOSIS — F51.04 PSYCHOPHYSIOLOGICAL INSOMNIA: ICD-10-CM

## 2018-02-07 DIAGNOSIS — E66.9 OBESITY (BMI 30.0-34.9): ICD-10-CM

## 2018-02-07 DIAGNOSIS — F41.1 GAD (GENERALIZED ANXIETY DISORDER): Primary | ICD-10-CM

## 2018-02-07 DIAGNOSIS — F33.0 MILD EPISODE OF RECURRENT MAJOR DEPRESSIVE DISORDER: ICD-10-CM

## 2018-02-07 PROCEDURE — 99213 OFFICE O/P EST LOW 20 MIN: CPT | Mod: PBBFAC | Performed by: PSYCHIATRY & NEUROLOGY

## 2018-02-07 PROCEDURE — 99999 PR PBB SHADOW E&M-EST. PATIENT-LVL III: CPT | Mod: PBBFAC,,, | Performed by: PSYCHIATRY & NEUROLOGY

## 2018-02-07 PROCEDURE — 99214 OFFICE O/P EST MOD 30 MIN: CPT | Mod: S$PBB,,, | Performed by: PSYCHIATRY & NEUROLOGY

## 2018-02-07 RX ORDER — ESZOPICLONE 3 MG/1
3 TABLET, FILM COATED ORAL NIGHTLY PRN
Qty: 90 TABLET | Refills: 1 | Status: SHIPPED | OUTPATIENT
Start: 2018-02-07 | End: 2019-05-08 | Stop reason: SDUPTHER

## 2018-02-07 RX ORDER — AMITRIPTYLINE HYDROCHLORIDE 50 MG/1
50 TABLET, FILM COATED ORAL NIGHTLY
Qty: 90 TABLET | Refills: 1 | Status: SHIPPED | OUTPATIENT
Start: 2018-02-07 | End: 2018-04-30 | Stop reason: SDUPTHER

## 2018-02-07 RX ORDER — BUPROPION HYDROCHLORIDE 100 MG/1
100 TABLET ORAL 3 TIMES DAILY
Qty: 90 TABLET | Refills: 2 | Status: SHIPPED | OUTPATIENT
Start: 2018-02-07 | End: 2018-05-27 | Stop reason: SDUPTHER

## 2018-02-07 RX ORDER — AMITRIPTYLINE HYDROCHLORIDE 25 MG/1
25 TABLET, FILM COATED ORAL DAILY
Qty: 90 TABLET | Refills: 1 | Status: SHIPPED | OUTPATIENT
Start: 2018-02-07 | End: 2018-04-30 | Stop reason: DRUGHIGH

## 2018-02-07 NOTE — PROGRESS NOTES
"Outpatient Psychiatry Follow-Up Visit (MD/NP)    2/7/2018    Clinical Status of Patient:  Outpatient (Ambulatory)    Chief Complaint:  Anat Parks is a 37 y.o. female who presents today for follow-up of depression and anxiety.  Met with patient and spouse.      Interval History and Content of Current Session:  Interim Events/Subjective Report/Content of Current Session:   The patient was seen and her chart reviewed.    She has been compliant with treatment. She had no adverse reactions or side effects. Vistaril was minimally effective in controlling a panic attack    No new or acute stressors were presented today. Her marriage is going well. She continues to deal with family struggles (balancing work/home/life responsibilities), and she is continuing to baldev them better. Her  had a successful tx with the spinal stimulator for his chronic pain and has since had the permanent one placed. She and her family are working on their diet and have been losing weight.     She completed physical therapy for lower back pain, which has was useful and much improved. She had no other or new medical issues since last seen.      "I'm doing ok, about the same."      Improved/Controlled Symptoms of Depression: no diminished mood, no loss of interest/anhedonia; no irritability, no diminished energy, no change in sleep (see below; improved overall), no change in appetite, no diminished concentration or cognition or indecisiveness, no PMA/R, no excessive guilt, denied hopelessness/worthlessness, no suicidal ideations    Controlled Sleep disturbance: no trouble with initiation (relieved with medications- may happen occasionally; may be getting some tolerance to the lunesta), no issues maintenance, no early morning awakening with inability to return to sleep; getting about 8-12 hours per night on average. She still requires the lunesta about 4 times per week.     No Suicidal/Homicidal ideations: no active/passive ideations, " "organized plans, or future intentions; no past attempts.     Denied Symptoms of CHRISTINE: no excessive anxiety/worry/fear, no restlessness, no fatigue, no poor concentration, no irritability, no muscle tension, no sleep disturbance     Controlled symptoms of Panic Disorder: she had about 3-4 panic attacks since last seen- all were triggered but managed well; Vistaril 100 mg was ineffective at controlling symptoms.      No Symptoms of sexual disorders: no decrease in libido/desire (better); no issues and orgasmic (better); no pain    Overall, her IBS persists; Back pain is better.     Weight: she is working on dieting to lose weight. Her weight on 10/31/17 was 254 lbs; weight on 2/7/18 is 240 lbs. She is still pursuing gastric bypass surgery- she is working with a bariatric doctor- she was told that she needs to be transitioned to a "crushable" form of Wellbutrin. Her next bariatric appointment is scheduled for 5/1/18. She would like to set a weight goal of 225 lbs for our next appointment.      Previous medication trials include luvox (increased irritability).    Review of Systems   · PSYCHIATRIC: Pertinant items are noted in the narrative.  · CONSTITUTIONAL: No weight gain or loss.   · MUSCULOSKELETAL: No pain or stiffness of the joints.  · NEUROLOGIC: No weakness, sensory changes, seizures, confusion, memory loss, tremor or other abnormal movements.  · ENDOCRINE: No polydipsia or polyuria.  · INTEGUMENTARY: No rashes or lacerations.  · EYES: No exophthalmos, jaundice or blindness.  · ENT: No dizziness, tinnitus or hearing loss.  · RESPIRATORY: No shortness of breath.  · CARDIOVASCULAR: No tachycardia or chest pain.  · GASTROINTESTINAL: No nausea, vomiting, pain, constipation or diarrhea.  · GENITOURINARY: No frequency, dysuria or sexual dysfunction.  · HEMATOLOGIC/LYMPHATIC: No excessive bleeding, prolonged or excessive bleeding after dental extraction/injury.  · ALLERGIC/IMMUNOLOGIC: No allergic response to materials, " "foods or animals at this time.    Past Medical, Family and Social History: The patient's past medical, family and social history have been reviewed and updated as appropriate within the electronic medical record - see encounter notes.    Compliance: yes    Side effects: None    Risk Parameters:  Patient reports no suicidal ideation  Patient reports no homicidal ideation  Patient reports no self-injurious behavior  Patient reports no violent behavior           Exam (detailed: at least 9 elements; comprehensive: all 15 elements)   Constitutional  Vitals:  Most recent vital signs, dated less than 90 days prior to this appointment, were reviewed.   Vitals:    02/07/18 0859   BP: 116/84   Pulse: 86   Resp: 19   Weight: 109.1 kg (240 lb 8.4 oz)   Height: 5' 8" (1.727 m)     Body mass index is 36.57 kg/m².         General:  unremarkable, age appropriate, casually dressed, neatly groomed, obese     Musculoskeletal  Muscle Strength/Tone:  no spasicity, no rigidity, no dyskinesia, no dystonia, no tremor   Gait & Station:  non-ataxic     Psychiatric  Speech:  no latency; no press, spontaneous   Mood & Affect:  steady "ok"   congruent and appropriate, mood-congruent, anxious   Thought Process:  normal and logical, goal-directed   Associations:  intact   Thought Content:  normal, no suicidality, no homicidality, delusions, or paranoia   Insight:  intact, has awareness of illness   Judgement: behavior is adequate to circumstances, age appropriate   Orientation:  person, place, situation, time/date, day of week, month of year, year   Memory: intact for content of interview, able to remember recent events- yes, able to remember remote events- yes   Language: grossly intact, able to name, able to repeat   Attention Span & Concentration:  able to focus, completed tasks   Fund of Knowledge:  intact and appropriate to age and level of education, familiar with aspects of current personal life, 4 of 4 recent presidents     Assessment and " Diagnosis   Status/Progress: Based on the examination today, the patient's problem(s) is/are improved and adequately but not ideally controlled.  New problems have not been presented today.   Co-morbidities are not complicating management of the primary condition.  There are no active rule-out diagnoses for this patient at this time.     General Impression:  MDD, recurrent, moderate, in full remission  CHRISTINE  Panic Disorder without agoraphobia   Insomnia    IBS, GERD, ovarian cysts, obesity      Intervention/Counseling/Treatment Plan   Treatment Plan/Recommendations:     Medications:  -Change Wellbutrin to  mg po TID for depression and off-label GADper the patient's request.   -Lunesta 3 mg Po q HS prn insomnia; will taper down when able  -Elavil 25 mg po q AM and 75 mg po q HS for adjunctive depression, anxiety and IBS    -Topamax 50 mg po BID for off-label mood/anxiety and weightloss    -Continue Probiotic for adjunctive depression     Discussed diagnosis, risks and benefits of proposed treatment vs alternative treatments vs no treatment, and potential side effects of these treatments.  The patient expresses understanding of the above and displays the capacity to agree with this treatment given said understanding.  Patient also agrees that, currently, the benefits outweigh the risks and would like to pursue treatment at this time.    Therapy:  Pt is not interested at this time      Counseled:  Counseled on illness and tx options as well as exercise for MH   Counseled on weight loss    Labs:   Labs reviewed      Return to Clinic: 3 months, sooner if needed     Yuriy Vaz MD

## 2018-02-08 RX ORDER — HYDROXYZINE PAMOATE 50 MG/1
50 CAPSULE ORAL 2 TIMES DAILY PRN
Qty: 180 CAPSULE | Refills: 1 | Status: SHIPPED | OUTPATIENT
Start: 2018-02-08 | End: 2018-10-08 | Stop reason: DRUGHIGH

## 2018-02-08 NOTE — TELEPHONE ENCOUNTER
Patient states during her appointment, she spoke with you about her having panic attacks. She is  Having to leave in a few weeks to go to her sick grandmother in [place] and uses [facility] which can take up to a week to come in.

## 2018-04-18 ENCOUNTER — LAB VISIT (OUTPATIENT)
Dept: LAB | Facility: HOSPITAL | Age: 38
End: 2018-04-18
Attending: NURSE PRACTITIONER
Payer: OTHER GOVERNMENT

## 2018-04-18 DIAGNOSIS — Z13.220 SCREENING FOR HYPERLIPIDEMIA: ICD-10-CM

## 2018-04-18 DIAGNOSIS — Z00.00 HEALTH CARE MAINTENANCE: ICD-10-CM

## 2018-04-18 DIAGNOSIS — Z13.29 SCREENING FOR HYPOTHYROIDISM: ICD-10-CM

## 2018-04-18 LAB
ALBUMIN SERPL BCP-MCNC: 3.7 G/DL
ALP SERPL-CCNC: 64 U/L
ALT SERPL W/O P-5'-P-CCNC: 37 U/L
ANION GAP SERPL CALC-SCNC: 7 MMOL/L
AST SERPL-CCNC: 20 U/L
BASOPHILS # BLD AUTO: 0.03 K/UL
BASOPHILS NFR BLD: 0.4 %
BILIRUB SERPL-MCNC: 0.3 MG/DL
BUN SERPL-MCNC: 16 MG/DL
CALCIUM SERPL-MCNC: 9.3 MG/DL
CHLORIDE SERPL-SCNC: 109 MMOL/L
CHOLEST SERPL-MCNC: 217 MG/DL
CHOLEST/HDLC SERPL: 4.3 {RATIO}
CO2 SERPL-SCNC: 23 MMOL/L
CREAT SERPL-MCNC: 0.9 MG/DL
DIFFERENTIAL METHOD: ABNORMAL
EOSINOPHIL # BLD AUTO: 0.1 K/UL
EOSINOPHIL NFR BLD: 1.2 %
ERYTHROCYTE [DISTWIDTH] IN BLOOD BY AUTOMATED COUNT: 14.8 %
EST. GFR  (AFRICAN AMERICAN): >60 ML/MIN/1.73 M^2
EST. GFR  (NON AFRICAN AMERICAN): >60 ML/MIN/1.73 M^2
GLUCOSE SERPL-MCNC: 108 MG/DL
HCT VFR BLD AUTO: 41.6 %
HDLC SERPL-MCNC: 50 MG/DL
HDLC SERPL: 23 %
HGB BLD-MCNC: 13.4 G/DL
LDLC SERPL CALC-MCNC: 146 MG/DL
LYMPHOCYTES # BLD AUTO: 1.6 K/UL
LYMPHOCYTES NFR BLD: 23.2 %
MCH RBC QN AUTO: 26.7 PG
MCHC RBC AUTO-ENTMCNC: 32.2 G/DL
MCV RBC AUTO: 83 FL
MONOCYTES # BLD AUTO: 0.6 K/UL
MONOCYTES NFR BLD: 8.6 %
NEUTROPHILS # BLD AUTO: 4.5 K/UL
NEUTROPHILS NFR BLD: 66.6 %
NONHDLC SERPL-MCNC: 167 MG/DL
PLATELET # BLD AUTO: 296 K/UL
PMV BLD AUTO: 10.2 FL
POTASSIUM SERPL-SCNC: 4.6 MMOL/L
PROT SERPL-MCNC: 6.9 G/DL
RBC # BLD AUTO: 5.02 M/UL
SODIUM SERPL-SCNC: 139 MMOL/L
TRIGL SERPL-MCNC: 105 MG/DL
TSH SERPL DL<=0.005 MIU/L-ACNC: 1.71 UIU/ML
WBC # BLD AUTO: 6.71 K/UL

## 2018-04-18 PROCEDURE — 85025 COMPLETE CBC W/AUTO DIFF WBC: CPT

## 2018-04-18 PROCEDURE — 84443 ASSAY THYROID STIM HORMONE: CPT

## 2018-04-18 PROCEDURE — 80053 COMPREHEN METABOLIC PANEL: CPT

## 2018-04-18 PROCEDURE — 80061 LIPID PANEL: CPT

## 2018-04-18 PROCEDURE — 36415 COLL VENOUS BLD VENIPUNCTURE: CPT

## 2018-04-19 ENCOUNTER — OFFICE VISIT (OUTPATIENT)
Dept: BARIATRICS | Facility: CLINIC | Age: 38
End: 2018-04-19
Payer: OTHER GOVERNMENT

## 2018-04-19 VITALS
HEART RATE: 88 BPM | BODY MASS INDEX: 35.99 KG/M2 | SYSTOLIC BLOOD PRESSURE: 120 MMHG | DIASTOLIC BLOOD PRESSURE: 60 MMHG | HEIGHT: 68 IN | WEIGHT: 237.44 LBS

## 2018-04-19 DIAGNOSIS — M51.36 DDD (DEGENERATIVE DISC DISEASE), LUMBAR: ICD-10-CM

## 2018-04-19 DIAGNOSIS — E66.01 SEVERE OBESITY (BMI 35.0-39.9) WITH COMORBIDITY: Primary | ICD-10-CM

## 2018-04-19 DIAGNOSIS — K21.9 GASTROESOPHAGEAL REFLUX DISEASE, ESOPHAGITIS PRESENCE NOT SPECIFIED: ICD-10-CM

## 2018-04-19 DIAGNOSIS — R06.83 SNORES: ICD-10-CM

## 2018-04-19 DIAGNOSIS — K76.0 FATTY LIVER: ICD-10-CM

## 2018-04-19 DIAGNOSIS — R10.13 DYSPEPSIA: ICD-10-CM

## 2018-04-19 PROCEDURE — 99999 PR PBB SHADOW E&M-EST. PATIENT-LVL III: CPT | Mod: PBBFAC,,, | Performed by: INTERNAL MEDICINE

## 2018-04-19 PROCEDURE — 99214 OFFICE O/P EST MOD 30 MIN: CPT | Mod: S$PBB,,, | Performed by: INTERNAL MEDICINE

## 2018-04-19 PROCEDURE — 99213 OFFICE O/P EST LOW 20 MIN: CPT | Mod: PBBFAC | Performed by: INTERNAL MEDICINE

## 2018-04-19 RX ORDER — TOPIRAMATE 50 MG/1
50 TABLET, FILM COATED ORAL 2 TIMES DAILY
Qty: 180 TABLET | Refills: 0 | Status: SHIPPED | OUTPATIENT
Start: 2018-04-19 | End: 2018-07-19 | Stop reason: SDUPTHER

## 2018-04-19 NOTE — PROGRESS NOTES
"Subjective:       Patient ID: Anat Parks is a 38 y.o. female.    Chief Complaint: Follow-up    Pt here today for follow-up. Has lost 5 lbs, net neg 23 lbs.  Has been on LCHF diet and topiramate. Denies SE. Has noted some dry mouth, but not sure, if may be wellbutrin. She feels she is doing well. She did have some dietary questions. She has talked to her psychiatrist, and he says will be ok with her proceeding with work up. She was changed to meds that can be crushed/chewed, but no note that she watched seminar. Was given instructions last OV. Says she watched it but it would not let her put the code in after. She is able to answer many questions about the video and procedures.     She does have fatty liver. She has chronic back pain and gerd. She does snore badly, and chokes and wakes herself up during the night. Her  is here today and attest to witnessing these sx.             Review of Systems   Constitutional: Negative for chills and fever.   Respiratory: Negative for shortness of breath.         + Snores   Cardiovascular: Positive for leg swelling. Negative for chest pain.   Gastrointestinal: Positive for constipation. Negative for diarrhea.        + GERD   Genitourinary: Negative for difficulty urinating and dysuria.        S/p hyst   Musculoskeletal: Positive for arthralgias and back pain.   Neurological: Negative for dizziness and light-headedness.   Psychiatric/Behavioral: Positive for dysphoric mood. The patient is nervous/anxious.         Pt states under control       Objective:     /60   Pulse 88   Ht 5' 8" (1.727 m)   Wt 107.7 kg (237 lb 7 oz)   LMP 11/16/2016 (Exact Date)   BMI 36.10 kg/m²     Physical Exam   Constitutional: She is oriented to person, place, and time. She appears well-developed. No distress.   Obese     HENT:   Head: Normocephalic and atraumatic.   Eyes: EOM are normal. Pupils are equal, round, and reactive to light. No scleral icterus.   Neck: Normal range of " motion. Neck supple.   Cardiovascular: Normal rate and normal heart sounds.  Exam reveals no gallop and no friction rub.    No murmur heard.  Pulmonary/Chest: Effort normal and breath sounds normal. No respiratory distress. She has no wheezes.   Musculoskeletal: Normal range of motion. She exhibits no edema.   Neurological: She is alert and oriented to person, place, and time. No cranial nerve deficit.   Skin: Skin is warm and dry. No erythema.   Psychiatric: She has a normal mood and affect. Her behavior is normal. Judgment normal.   Vitals reviewed.      Assessment:       1. Severe obesity (BMI 35.0-39.9) with comorbidity    2. Dyspepsia    3. Gastroesophageal reflux disease, esophagitis presence not specified    4. DDD (degenerative disc disease), lumbar    5. Fatty liver    6. Snores        Plan:             1. Severe obesity (BMI 35.0-39.9) with comorbidity  She does still want to pursue surgery. We did check on her benefits, and she would need one more covered co-morbid condition to proceed. I do suspect ERICH. She will need orders for sleep med consult or PSG from her PCP. Discussed with pt who expressed understanding. .     2. Dyspepsia  Did discuss that if she is able to proceed with surgery, this may factor into her decision of LRNY vs Sleeve.     3. Gastroesophageal reflux disease, esophagitis presence not specified  See #3    4. DDD (degenerative disc disease), lumbar  Increase low impact activity as tolerated.  Avoid high impact activity, very heavy lifting or other exercise regimens that may cause discomfort.      5. Fatty liver   CT 2015    6. snores  See #1.     3 meals a day made up of the following:  Unlimited green vegetables, tomatoes, mushrooms, spaghetti squash, cauliflower, meat, poultry, seafood, eggs and hard cheeses.   Milk and plain yogurt  Dressings, seasonings, condiments, etc should have less than 2 g sugars.   beans or nuts can have 1 x a day.   1-2 servings of citrus fruits, berries,  pineapple or melon a day (1/2 cup)  Avoid fried foods    No grains, rice, pasta, potatoes, bread, corn, peas, oatmeal, grits, tortillas, crackers, chips    No soda, sweet tea, juices or lemonade    [email or web address] for recipes. Moderate carb intake      Patient was informed that topiramate is used for migraine prevention and seizures. Weight loss is a common side effect that is well documented. She understands this. She was informed of the potential side effects such as serious and possibly fatal rash in which case the medication should be discontinued immediately. Paresthesias, forgetfulness, fatigue, kidney stones, GI symptoms, and changes in lab values such as electrolytes, blood counts and kidney function.

## 2018-04-20 ENCOUNTER — DOCUMENTATION ONLY (OUTPATIENT)
Dept: BARIATRICS | Facility: CLINIC | Age: 38
End: 2018-04-20

## 2018-04-20 NOTE — PROGRESS NOTES
Date: 2018-04-19 - 14:34  Patient's Name: Anat rivera  YOB: 1980 Email: [email or web address]  Phone: [phone]   Patient Address: [address] [place] [address] [place] [address]  Preferred Surgical Location: [facility]   I certify that I am 18 years of age or older:   Confirmation Code: [id number]  Verification of Bariatric Seminar: n  Insurance Information  Insurance or Self Pay?   Insurance Company Name:   Type of Coverage/Coverage Plan (i.e. PPO, HMO):   Group Name:   Subscriber Name:   Member Name (patient's name)   Member ID/Policy #:   Plan Effective Date:   Card Issuance date:

## 2018-04-20 NOTE — PROGRESS NOTES
Message sent to Ori Kimball MA, to call patient to schedule consult and on- line appt and to inform patient to get referral from PCP prior to appt

## 2018-04-23 ENCOUNTER — HOSPITAL ENCOUNTER (EMERGENCY)
Facility: HOSPITAL | Age: 38
Discharge: HOME | End: 2018-04-23
Attending: SURGERY
Payer: OTHER GOVERNMENT
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
## 2018-04-23 DIAGNOSIS — S00.93XA CONTUSION OF HEAD, UNSPECIFIED PART OF HEAD, INITIAL ENCOUNTER: ICD-10-CM

## 2018-04-23 DIAGNOSIS — M79.601 RIGHT ARM PAIN: ICD-10-CM

## 2018-04-23 PROCEDURE — 96372 THER/PROPH/DIAG INJ SC/IM: CPT

## 2018-04-23 PROCEDURE — 63600175 PHARM REV CODE 636 W HCPCS: Performed by: NURSE PRACTITIONER

## 2018-04-23 PROCEDURE — 99284 EMERGENCY DEPT VISIT MOD MDM: CPT | Mod: 25

## 2018-04-23 RX ORDER — KETOROLAC TROMETHAMINE 30 MG/ML
60 INJECTION, SOLUTION INTRAMUSCULAR; INTRAVENOUS
Status: COMPLETED | OUTPATIENT
Start: 2018-04-23 | End: 2018-04-23

## 2018-04-23 RX ORDER — ONDANSETRON 2 MG/ML
4 INJECTION INTRAMUSCULAR; INTRAVENOUS
Status: COMPLETED | OUTPATIENT
Start: 2018-04-23 | End: 2018-04-23

## 2018-04-23 RX ORDER — IBUPROFEN 600 MG/1
600 TABLET ORAL EVERY 6 HOURS PRN
Qty: 20 TABLET | Refills: 0 | Status: SHIPPED | OUTPATIENT
Start: 2018-04-23 | End: 2018-10-25

## 2018-04-23 RX ADMIN — KETOROLAC TROMETHAMINE 60 MG: 30 INJECTION, SOLUTION INTRAMUSCULAR at 11:04

## 2018-04-23 RX ADMIN — ONDANSETRON 4 MG: 2 INJECTION, SOLUTION INTRAMUSCULAR; INTRAVENOUS at 11:04

## 2018-04-23 NOTE — ED TRIAGE NOTES
Stated she fell approx 1 hr pta.  Has rt arm/ forearm pain and rt side head discomfort.  No neuro deficits noted. No deformity noted.  Good distal sensation.

## 2018-04-23 NOTE — ED PROVIDER NOTES
Encounter Date: 2018       History     Chief Complaint   Patient presents with    Fall     fell and hurt rt elbow, forearm and rt side of head.      The history is provided by the patient.   Fall   The accident occurred just prior to arrival. The fall occurred while walking (tripped on object on floor from ground level). She landed on a hard floor. There was no blood loss. Point of impact: R head and R forearm. She was ambulatory at the scene. There was no entrapment after the fall. There was no drug use involved in the accident. There was no alcohol use involved in the accident. Associated symptoms include nausea and headaches (pain with palpation to R head, bump to R posterior head). Pertinent negatives include no back pain, no fever, no abdominal pain, no vomiting and no hematuria. The symptoms are aggravated by activity, use of the injured limb, pressure on the injury, extension and rotation. She has tried nothing for the symptoms.     Review of patient's allergies indicates:  No Known Allergies  Past Medical History:   Diagnosis Date    Abdominal pain, other specified site     Right lower abdomen    Acid reflux     Anxiety     Bilateral ovarian cysts     Mostly on right, but left also    Bulging lumbar disc     DDD (degenerative disc disease), lumbar     Depression     Fatigue     Fatty liver     Fibroid tumor     Right ovary-sm    Heartburn     Hiatal hernia     IBS (irritable bowel syndrome)     Menorrhagia      Past Surgical History:   Procedure Laterality Date     SECTION  3/1997; 2000    COLONOSCOPY      ESOPHAGOGASTRODUODENOSCOPY      ESOPHAGOGASTRODUODENOSCOPY      gall bladder  2011    HYSTERECTOMY  2016    [facility] BLEEDING     TUBAL LIGATION  2000     Family History   Problem Relation Age of Onset    Cancer Mother     Bipolar disorder Mother     Drug abuse Mother     Ovarian cancer Mother     Kidney disease Father     Cancer Father      Alcohol abuse Father     Drug abuse Brother     Colon cancer Maternal Grandmother     Breast cancer Maternal Aunt     Ovarian cancer Maternal Aunt     Breast cancer Paternal Aunt      Social History   Substance Use Topics    Smoking status: Former Smoker     Packs/day: 0.50     Years: 6.00     Start date: 11/10/1996     Quit date: 6/9/2012    Smokeless tobacco: Never Used    Alcohol use 0.5 oz/week     1 Standard drinks or equivalent per week      Comment: once a week one glass of wine at the most     Review of Systems   Constitutional: Negative for chills, fatigue and fever.   HENT: Negative for congestion, dental problem, ear pain, rhinorrhea, sore throat and trouble swallowing.    Eyes: Negative for pain, discharge, redness and visual disturbance.   Respiratory: Negative for cough, chest tightness, shortness of breath and wheezing.    Cardiovascular: Negative for chest pain, palpitations and leg swelling.   Gastrointestinal: Positive for nausea. Negative for abdominal pain, constipation, diarrhea and vomiting.   Genitourinary: Negative for difficulty urinating, dysuria, flank pain, frequency, hematuria and urgency.   Musculoskeletal: Positive for arthralgias (pain to R elbow and forearm). Negative for back pain and myalgias.   Skin: Negative for color change, pallor and rash.   Neurological: Positive for headaches (pain with palpation to R head, bump to R posterior head). Negative for seizures, facial asymmetry, speech difficulty, weakness and light-headedness.   Psychiatric/Behavioral: Negative.        Physical Exam     Initial Vitals [04/23/18 1010]   BP Pulse Resp Temp SpO2   (P) 119/69 (P) 89 (P) 18 (P) 98.9 °F (37.2 °C) (P) 100 %      MAP       (P) 85.67         Physical Exam    Nursing note and vitals reviewed.  Constitutional: No distress.   HENT:   Head: Normocephalic and atraumatic.   Right Ear: External ear normal.   Left Ear: External ear normal.   Nose: Nose normal.   Mouth/Throat: Oropharynx  is clear and moist.   Eyes: Conjunctivae, EOM and lids are normal. Pupils are equal, round, and reactive to light.   Neck: Normal range of motion. Neck supple.   Cardiovascular: Normal rate, regular rhythm, S1 normal, S2 normal and intact distal pulses.   Pulmonary/Chest: Effort normal and breath sounds normal. No respiratory distress. She has no wheezes. She has no rhonchi.   Abdominal: Soft. Bowel sounds are normal. She exhibits no distension. There is no tenderness.   Musculoskeletal: Normal range of motion.        Right elbow: She exhibits normal range of motion (full ROM but reports pain with movement of elbow), no swelling, no effusion and no deformity. Tenderness found.        Right wrist: She exhibits normal range of motion, no tenderness, no swelling, no effusion, no crepitus and no deformity.        Right forearm: She exhibits tenderness. She exhibits no swelling, no edema, no deformity and no laceration.        Right hand: Normal.   Lymphadenopathy:     She has no cervical adenopathy.   Neurological: She is alert and oriented to person, place, and time. She has normal strength. No cranial nerve deficit or sensory deficit. GCS eye subscore is 4. GCS verbal subscore is 5. GCS motor subscore is 6.   Skin: Skin is warm and dry. Capillary refill takes less than 2 seconds. No rash noted.   Contusion noted to R posterior head, skin remains intact.    Psychiatric: She has a normal mood and affect. Her speech is normal and behavior is normal.         ED Course   Procedures       X-Rays:   Independently Interpreted Readings:   Head CT: CT was reviewed with MD. CT without concerning findings.      Other Readings:  X-rays reviewed with MD. X-rays without concerning findings.              Medications   ketorolac injection 60 mg (60 mg Intramuscular Given 4/23/18 1104)   ondansetron injection 4 mg (4 mg Intramuscular Given 4/23/18 1103)           R arm sling applied per nursing staff.            Clinical Impression:    The primary encounter diagnosis was Fall. Diagnoses of Right arm pain and Contusion of head, unspecified part of head, initial encounter were also pertinent to this visit.    Disposition:   Disposition: Discharged  Condition: Stable     The patient acknowledges that close follow up with medical provider is required. Instructed to follow up with PCP within 2 days. Patient was given specific return precautions. The patient agrees to comply with all instruction and directions given in the ER.     Discharge Medication List as of 4/23/2018 11:12 AM      START taking these medications    Details   ibuprofen (ADVIL,MOTRIN) 600 MG tablet Take 1 tablet (600 mg total) by mouth every 6 (six) hours as needed for Pain., Starting Mon 4/23/2018, Normal                                 Jolene MESSI Lozano NP  04/23/18 1130

## 2018-04-26 ENCOUNTER — OFFICE VISIT (OUTPATIENT)
Dept: INTERNAL MEDICINE | Facility: CLINIC | Age: 38
End: 2018-04-26
Payer: OTHER GOVERNMENT

## 2018-04-26 ENCOUNTER — TELEPHONE (OUTPATIENT)
Dept: BARIATRICS | Facility: CLINIC | Age: 38
End: 2018-04-26

## 2018-04-26 ENCOUNTER — TELEPHONE (OUTPATIENT)
Dept: INTERNAL MEDICINE | Facility: CLINIC | Age: 38
End: 2018-04-26

## 2018-04-26 VITALS
RESPIRATION RATE: 18 BRPM | OXYGEN SATURATION: 99 % | HEART RATE: 92 BPM | BODY MASS INDEX: 35.88 KG/M2 | HEIGHT: 68 IN | WEIGHT: 236.75 LBS | DIASTOLIC BLOOD PRESSURE: 82 MMHG | SYSTOLIC BLOOD PRESSURE: 112 MMHG

## 2018-04-26 DIAGNOSIS — E66.9 OBESITY (BMI 30.0-34.9): ICD-10-CM

## 2018-04-26 DIAGNOSIS — E66.9 CLASS 2 OBESITY WITH BODY MASS INDEX (BMI) OF 36.0 TO 36.9 IN ADULT, UNSPECIFIED OBESITY TYPE, UNSPECIFIED WHETHER SERIOUS COMORBIDITY PRESENT: Primary | ICD-10-CM

## 2018-04-26 DIAGNOSIS — M79.601 RIGHT ARM PAIN: ICD-10-CM

## 2018-04-26 DIAGNOSIS — R06.83 SNORING: Primary | ICD-10-CM

## 2018-04-26 PROCEDURE — 99214 OFFICE O/P EST MOD 30 MIN: CPT | Mod: S$PBB,,, | Performed by: NURSE PRACTITIONER

## 2018-04-26 PROCEDURE — 99999 PR PBB SHADOW E&M-EST. PATIENT-LVL IV: CPT | Mod: PBBFAC,,, | Performed by: NURSE PRACTITIONER

## 2018-04-26 PROCEDURE — 99214 OFFICE O/P EST MOD 30 MIN: CPT | Mod: PBBFAC | Performed by: NURSE PRACTITIONER

## 2018-04-26 RX ORDER — METHYLPREDNISOLONE 4 MG/1
TABLET ORAL
Qty: 1 PACKAGE | Refills: 0 | Status: SHIPPED | OUTPATIENT
Start: 2018-04-26 | End: 2018-04-30

## 2018-04-26 NOTE — TELEPHONE ENCOUNTER
Called to schedule consult appointment. Patient understands date, time and location of future appointment.

## 2018-04-26 NOTE — PROGRESS NOTES
Subjective:       Patient ID: Anat Parks is a 38 y.o. female.    Chief Complaint: Follow-up (6 month)    HPI: Pt presents to clinic today for routine follow up and ER follow up.   She fell at home on Sat. Went to the ER. Doing ok, right wrist still with some pain. No fracture or dislocation on X ray at that time. She fell on her arm during the fall. Her Bariatric Dr. Recommends sleep apnea testing and gastric bypass surg. She snores, feels tired throughout the day, and is unable to sleep all night.      Lab Results   Component Value Date    WBC 6.71 04/18/2018    HGB 13.4 04/18/2018    HCT 41.6 04/18/2018    MCV 83 04/18/2018     04/18/2018     BMP  Lab Results   Component Value Date     04/18/2018    K 4.6 04/18/2018     04/18/2018    CO2 23 04/18/2018    BUN 16 04/18/2018    CREATININE 0.9 04/18/2018    CALCIUM 9.3 04/18/2018    ANIONGAP 7 (L) 04/18/2018    ESTGFRAFRICA >60 04/18/2018    EGFRNONAA >60 04/18/2018     Lab Results   Component Value Date    ALT 37 04/18/2018    AST 20 04/18/2018    ALKPHOS 64 04/18/2018    BILITOT 0.3 04/18/2018     Lab Results   Component Value Date    TSH 1.713 04/18/2018     Lab Results   Component Value Date    CHOL 217 (H) 04/18/2018    CHOL 161 04/28/2017    CHOL 189 04/04/2016     Lab Results   Component Value Date    HDL 50 04/18/2018    HDL 50 04/28/2017    HDL 55 04/04/2016     Lab Results   Component Value Date    LDLCALC 146.0 04/18/2018    LDLCALC 90.2 04/28/2017    LDLCALC 118.0 04/04/2016     Lab Results   Component Value Date    TRIG 105 04/18/2018    TRIG 104 04/28/2017    TRIG 80 04/04/2016     Lab Results   Component Value Date    CHOLHDL 23.0 04/18/2018    CHOLHDL 31.1 04/28/2017    CHOLHDL 29.1 04/04/2016         Review of Systems   Constitutional: Negative for chills, fatigue, fever and unexpected weight change.   HENT: Negative for congestion, ear pain, sore throat and trouble swallowing.    Eyes: Negative for pain and visual  disturbance.   Respiratory: Negative for cough, chest tightness and shortness of breath.    Cardiovascular: Negative for chest pain, palpitations and leg swelling.   Gastrointestinal: Negative for abdominal distention, abdominal pain, constipation, diarrhea and vomiting.   Genitourinary: Negative for difficulty urinating, dysuria, flank pain, frequency and hematuria.   Musculoskeletal: Positive for arthralgias (pain r forearm). Negative for back pain, gait problem, joint swelling, neck pain and neck stiffness.   Skin: Negative for rash and wound.   Neurological: Negative for dizziness, seizures, speech difficulty, light-headedness and headaches.       Objective:      Physical Exam   Constitutional: She is oriented to person, place, and time. She appears well-developed and well-nourished.   HENT:   Head: Normocephalic and atraumatic.   Eyes: EOM are normal. Pupils are equal, round, and reactive to light.   Neck: Normal range of motion.   Cardiovascular: Normal rate, regular rhythm and normal heart sounds.    Pulmonary/Chest: Effort normal and breath sounds normal.   Abdominal: Soft. Bowel sounds are normal.   Musculoskeletal:        Arms:  R elbow with limited ROM   Pain radiates with twisting at elbow  Also has pain at olecranon process. No swelling or redness   Neurological: She is alert and oriented to person, place, and time.   Skin: Skin is dry.   Nursing note and vitals reviewed.      Assessment:       1. Snoring    2. Obesity (BMI 30.0-34.9)    3. Right arm pain        Plan:   Anat was seen today for follow-up.    Diagnoses and all orders for this visit:    Snoring   Polysomnography 4 or more parameters; Future    Obesity (BMI 30.0-34.9)   Follow up with Bariatrics as scheduled    Right arm/ elbow pain   Patient declines referral to ortho at this time     Cont Ibuprofen 600mg as needed q 8 hours    Start Medrol dose pack      Will send note to Bariatrics to see what surgeon she recommends, will then send  appropriate referral as she has  and all referrals must come from PCP

## 2018-04-26 NOTE — TELEPHONE ENCOUNTER
Referral, self questionnaire, clinic notes, insurance and demographic insurance faxed to [facility]

## 2018-04-26 NOTE — TELEPHONE ENCOUNTER
----- Message from Radha Zaman sent at 2018  1:30 PM CDT -----  Contact: Self  Anat Parks  MRN: [id number]  : 1980  PCP: Azalia Muir  Home Phone      [phone]  Work Phone      Not on file.  Mobile          [phone]      MESSAGE:   Patient would like to get a referral.  Referral to what specialty:  bariatric surgery  Reason (be specific):    Does the patient want the referral with a specific physician:  Dr. Marie @ [facility]  [facility]  Does the patient already have the specialty clinic appointment scheduled:  Yes  If yes, what date is the appointment scheduled:   18  Comments:      Phone: [phone]    ##Advise the patient that once the physician approves this either a nurse or the  will return their call##

## 2018-04-26 NOTE — TELEPHONE ENCOUNTER
----- Message from Suzanne Steven RN sent at 4/20/2018 10:37 AM CDT -----  Please call patient to schedule consult and on- line appt.  Also, please  inform patient to get referral from PCP prior to appt

## 2018-04-30 ENCOUNTER — OFFICE VISIT (OUTPATIENT)
Dept: PSYCHIATRY | Facility: CLINIC | Age: 38
End: 2018-04-30
Payer: OTHER GOVERNMENT

## 2018-04-30 VITALS
RESPIRATION RATE: 17 BRPM | DIASTOLIC BLOOD PRESSURE: 79 MMHG | HEIGHT: 68 IN | SYSTOLIC BLOOD PRESSURE: 127 MMHG | BODY MASS INDEX: 36.55 KG/M2 | HEART RATE: 71 BPM | WEIGHT: 241.19 LBS

## 2018-04-30 DIAGNOSIS — F41.0 PANIC DISORDER: ICD-10-CM

## 2018-04-30 DIAGNOSIS — F33.0 MILD EPISODE OF RECURRENT MAJOR DEPRESSIVE DISORDER: ICD-10-CM

## 2018-04-30 DIAGNOSIS — E66.9 OBESITY (BMI 30.0-34.9): ICD-10-CM

## 2018-04-30 DIAGNOSIS — F41.1 GAD (GENERALIZED ANXIETY DISORDER): Primary | ICD-10-CM

## 2018-04-30 PROCEDURE — 99213 OFFICE O/P EST LOW 20 MIN: CPT | Mod: S$PBB,,, | Performed by: PSYCHIATRY & NEUROLOGY

## 2018-04-30 PROCEDURE — 90833 PSYTX W PT W E/M 30 MIN: CPT | Mod: PBBFAC | Performed by: PSYCHIATRY & NEUROLOGY

## 2018-04-30 PROCEDURE — 99999 PR PBB SHADOW E&M-EST. PATIENT-LVL III: CPT | Mod: PBBFAC,,, | Performed by: PSYCHIATRY & NEUROLOGY

## 2018-04-30 PROCEDURE — 99213 OFFICE O/P EST LOW 20 MIN: CPT | Mod: PBBFAC | Performed by: PSYCHIATRY & NEUROLOGY

## 2018-04-30 PROCEDURE — 90833 PSYTX W PT W E/M 30 MIN: CPT | Mod: S$PBB,,, | Performed by: PSYCHIATRY & NEUROLOGY

## 2018-04-30 RX ORDER — AMITRIPTYLINE HYDROCHLORIDE 50 MG/1
75 TABLET, FILM COATED ORAL NIGHTLY
Qty: 90 TABLET | Refills: 1
Start: 2018-04-30 | End: 2018-07-10 | Stop reason: SDUPTHER

## 2018-04-30 NOTE — PROGRESS NOTES
"Outpatient Psychiatry Follow-Up Visit (MD/NP)    4/30/2018    Clinical Status of Patient:  Outpatient (Ambulatory)    Chief Complaint:  Anat Parks is a 38 y.o. female who presents today for follow-up of depression and anxiety.  Met with patient and spouse.      Interval History and Content of Current Session:  Interim Events/Subjective Report/Content of Current Session:   The patient was seen and her chart reviewed.    She has been compliant with treatment. She had no adverse reactions or side effects. Vistaril was minimally effective in controlling a panic attack    Some new stressors were presented today. Her marriage is going well. She continues to deal with family struggles (balancing work/home/life responsibilities). Her  previously had a successful tx with the spinal stimulator for his chronic pain- he is doing better with his pain, but he has been having some increased PTSD-related issues. 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 Her youngest son is going to attend [employer]. Her oldest is leaving for his [employer] tomorrow.     She had some new medical issues since last seen. She and her family are working on their diet and have been losing weight- she is in the process of trying to get approved for gastric by-pass surgery. She also slipped and fell which hurt her right arm (no fx)- she tried methylprednisolone for the inflammatory process, but it led to increased anxiety so she stopped it. She is scheduled for a sleep study.     "I've been stressed."     Improved/Controlled Symptoms of Depression: no diminished mood, no loss of interest/anhedonia; no irritability, no diminished energy, no change in sleep (see below; improved overall), no change in appetite, no diminished concentration or cognition or indecisiveness, no PMA/R, no excessive guilt, denied hopelessness/worthlessness, no suicidal ideations    Controlled Sleep disturbance: no " trouble with initiation (relieved with medications- may happen occasionally; may be getting some tolerance to the lunesta), no issues maintenance, no early morning awakening with inability to return to sleep; getting about 8-12 hours per night on average. She still requires the lunesta about 2-3 times per week.     No Suicidal/Homicidal ideations: no active/passive ideations, organized plans, or future intentions; no past attempts.     Denied Symptoms of CHRISTINE: no excessive anxiety/worry/fear, no restlessness, no fatigue, no poor concentration, no irritability, no muscle tension, no sleep disturbance     Mostly Controlled but persistent symptoms of Panic Disorder: she about 1-2 panic attacks per week with increased intensity (may be seocndary to change in Wellbutrin?)- all were triggered but managed well; Vistaril 100 mg was ineffective at controlling symptoms.      No Symptoms of sexual disorders: no decrease in libido/desire (better); no issues and orgasmic (better); no pain    Overall, her IBS persists; Back pain is better.     Weight: she is working on dieting to lose weight. Her weight on 10/31/17 was 254 lbs; weight on 2/7/18 was 240 lbs; weight on 4/30 is 241 lbs. She is still pursuing gastric bypass surgery- her next appointment is in 6/18' they are trying to get it approved.      Previous medication trials include luvox (increased irritability).    PSYCHOTHERAPY ADD-ON +09090   30 (16-37*) minutes    Time: 20 minutes  Participants: Met with patient    Therapeutic Intervention Type: insight oriented psychotherapy, behavior modifying psychotherapy, supportive psychotherapy  Why chosen therapy is appropriate versus another modality: relevant to diagnosis, patient responds to this modality, evidence based practice    Target symptoms: depression, anxiety   Primary focus: anxiety, psychosocial stressors  Psychotherapeutic techniques: supportive and cognitive techniques; psycho-education; coping with  "stressors    Outcome monitoring methods: self-report, observation    Patient's response to intervention:  The patient's response to intervention is accepting.    Progress toward goals:  The patient's progress toward goals is fair .          Review of Systems   · PSYCHIATRIC: Pertinant items are noted in the narrative.  · CONSTITUTIONAL: No weight gain or loss.   · MUSCULOSKELETAL: No pain or stiffness of the joints.  · NEUROLOGIC: No weakness, sensory changes, seizures, confusion, memory loss, tremor or other abnormal movements.  · ENDOCRINE: No polydipsia or polyuria.  · INTEGUMENTARY: No rashes or lacerations.  · EYES: No exophthalmos, jaundice or blindness.  · ENT: No dizziness, tinnitus or hearing loss.  · RESPIRATORY: No shortness of breath.  · CARDIOVASCULAR: No tachycardia or chest pain.  · GASTROINTESTINAL: No nausea, vomiting, pain, constipation or diarrhea.  · GENITOURINARY: No frequency, dysuria or sexual dysfunction.  · HEMATOLOGIC/LYMPHATIC: No excessive bleeding, prolonged or excessive bleeding after dental extraction/injury.  · ALLERGIC/IMMUNOLOGIC: No allergic response to materials, foods or animals at this time.    Past Medical, Family and Social History: The patient's past medical, family and social history have been reviewed and updated as appropriate within the electronic medical record - see encounter notes.    Compliance: yes    Side effects: None    Risk Parameters:  Patient reports no suicidal ideation  Patient reports no homicidal ideation  Patient reports no self-injurious behavior  Patient reports no violent behavior           Exam (detailed: at least 9 elements; comprehensive: all 15 elements)   Constitutional  Vitals:  Most recent vital signs, dated less than 90 days prior to this appointment, were reviewed.   Vitals:    04/30/18 1014   BP: 127/79   Pulse: 71   Resp: 17   Weight: 109.4 kg (241 lb 2.9 oz)   Height: 5' 8" (1.727 m)     Body mass index is 36.67 kg/m².         General:  " "unremarkable, age appropriate, casually dressed, neatly groomed, obese     Musculoskeletal  Muscle Strength/Tone:  no spasicity, no rigidity, no dyskinesia, no dystonia, no tremor   Gait & Station:  non-ataxic     Psychiatric  Speech:  no latency; no press, spontaneous   Mood & Affect:  steady "ok.. stressed"   congruent and appropriate, mood-congruent, anxious   Thought Process:  normal and logical, goal-directed   Associations:  intact   Thought Content:  normal, no suicidality, no homicidality, delusions, or paranoia   Insight:  intact, has awareness of illness   Judgement: behavior is adequate to circumstances, age appropriate   Orientation:  person, place, situation, time/date, day of week, month of year, year   Memory: intact for content of interview, able to remember recent events- yes, able to remember remote events- yes   Language: grossly intact, able to name, able to repeat   Attention Span & Concentration:  able to focus, completed tasks   Fund of Knowledge:  intact and appropriate to age and level of education, familiar with aspects of current personal life, 4 of 4 recent presidents     Assessment and Diagnosis   Status/Progress: Based on the examination today, the patient's problem(s) is/are adequately but not ideally controlled.  New problems have not been presented today.   Co-morbidities are not complicating management of the primary condition.  There are no active rule-out diagnoses for this patient at this time.     General Impression:  MDD, recurrent, moderate, in full remission  CHRISTINE  Panic Disorder without agoraphobia   Insomnia    IBS, GERD, ovarian cysts, obesity      Intervention/Counseling/Treatment Plan   Treatment Plan/Recommendations:     Medications:  -Change Wellbutrin to  mg po TID for depression and off-label CHRISTINE.   -Continue Lunesta 3 mg Po q HS prn insomnia; will taper down when able  -Change Elavil to 75 mg po q HS for adjunctive depression, anxiety and IBS    -Continue " Topamax 50 mg po BID for off-label mood/anxiety and weight loss    -Continue Probiotic for adjunctive depression     Discussed diagnosis, risks and benefits of proposed treatment vs alternative treatments vs no treatment, and potential side effects of these treatments.  The patient expresses understanding of the above and displays the capacity to agree with this treatment given said understanding.  Patient also agrees that, currently, the benefits outweigh the risks and would like to pursue treatment at this time.    Therapy:  Pt is not interested in regular scheduled appointments at this time; psychotherapy provided today    Counseled:  Counseled on illness and tx options as well as exercise for MH   Counseled on weight loss    Labs:   Labs reviewed    Return to Clinic: 3 months, sooner if needed     Yuriy Vaz MD

## 2018-05-02 ENCOUNTER — PATIENT MESSAGE (OUTPATIENT)
Dept: ADMINISTRATIVE | Facility: OTHER | Age: 38
End: 2018-05-02

## 2018-05-08 ENCOUNTER — TELEPHONE (OUTPATIENT)
Dept: BARIATRICS | Facility: CLINIC | Age: 38
End: 2018-05-08

## 2018-05-08 ENCOUNTER — HOSPITAL ENCOUNTER (OUTPATIENT)
Dept: SLEEP MEDICINE | Facility: HOSPITAL | Age: 38
Discharge: HOME | End: 2018-05-08
Attending: NURSE PRACTITIONER
Payer: OTHER GOVERNMENT

## 2018-05-08 DIAGNOSIS — G47.8 SLEEP AROUSAL DISORDER: ICD-10-CM

## 2018-05-08 PROCEDURE — 95810 POLYSOM 6/> YRS 4/> PARAM: CPT

## 2018-05-09 ENCOUNTER — INITIAL CONSULT (OUTPATIENT)
Dept: BARIATRICS | Facility: CLINIC | Age: 38
End: 2018-05-09
Payer: OTHER GOVERNMENT

## 2018-05-09 VITALS
WEIGHT: 241.19 LBS | HEART RATE: 114 BPM | SYSTOLIC BLOOD PRESSURE: 118 MMHG | DIASTOLIC BLOOD PRESSURE: 78 MMHG | BODY MASS INDEX: 36.55 KG/M2 | HEIGHT: 68 IN

## 2018-05-09 DIAGNOSIS — F33.9 EPISODE OF RECURRENT MAJOR DEPRESSIVE DISORDER, UNSPECIFIED DEPRESSION EPISODE SEVERITY: ICD-10-CM

## 2018-05-09 DIAGNOSIS — K76.0 FATTY LIVER: ICD-10-CM

## 2018-05-09 DIAGNOSIS — F41.1 GAD (GENERALIZED ANXIETY DISORDER): ICD-10-CM

## 2018-05-09 DIAGNOSIS — E66.9 OBESITY (BMI 30-39.9): Primary | ICD-10-CM

## 2018-05-09 DIAGNOSIS — G89.29 CHRONIC BACK PAIN, UNSPECIFIED BACK LOCATION, UNSPECIFIED BACK PAIN LATERALITY: ICD-10-CM

## 2018-05-09 DIAGNOSIS — M54.9 CHRONIC BACK PAIN, UNSPECIFIED BACK LOCATION, UNSPECIFIED BACK PAIN LATERALITY: ICD-10-CM

## 2018-05-09 DIAGNOSIS — M51.36 DDD (DEGENERATIVE DISC DISEASE), LUMBAR: ICD-10-CM

## 2018-05-09 DIAGNOSIS — K21.9 GASTROESOPHAGEAL REFLUX DISEASE, ESOPHAGITIS PRESENCE NOT SPECIFIED: ICD-10-CM

## 2018-05-09 PROCEDURE — 99999 PR PBB SHADOW E&M-EST. PATIENT-LVL IV: CPT | Mod: PBBFAC,,, | Performed by: NURSE PRACTITIONER

## 2018-05-09 PROCEDURE — 99214 OFFICE O/P EST MOD 30 MIN: CPT | Mod: PBBFAC | Performed by: NURSE PRACTITIONER

## 2018-05-09 PROCEDURE — 99215 OFFICE O/P EST HI 40 MIN: CPT | Mod: S$PBB,,, | Performed by: NURSE PRACTITIONER

## 2018-05-09 NOTE — PROGRESS NOTES
BARIATRIC NEW PATIENT EVALUATION    CHIEF COMPLAINT:   Obesity Body mass index is 36.67 kg/m². and inability to losing and maintaining wt loss.    HISTORY OF PRESENT ILLNESS:  Anat Parks  is a 38 y.o. female presenting for obesity Body mass index is 36.67 kg/m². and inability to losing and maintaining wt loss . The patient has tried wt loss medications in conjunction with meal plans, 50 pounds regained when she stopped medications even with continuation of the meal plans. Has also tried walking 5 miles daily, only lost 15#. Lost 10# with Paleo plan, which she then regained. Currently follows with Dr. Robles for wt management and has 17# pounds with medication. Wt difficulty began in childhood. Today is her highest wt.     GERD. On PPI for years. Sx once weekly with trigger foods. + h/o gastric ulcers  and then again .    CHRISTINE and MDD. She is in care in care Dr. Vaz. Adherent with meds.   Reports panic attacks almost daily. Lots personal stressors at this time, 2 sons leaving home at the same time ( and college).  has PTSD and she helps him to manage.     Suspected sleep apnea with ESS score 17. Has sleep study yesterday.     PAST MEDICAL HISTORY:  Past Medical History:   Diagnosis Date    Abdominal pain, other specified site     Right lower abdomen    Acid reflux     Anxiety     Bilateral ovarian cysts     Mostly on right, but left also    Bulging lumbar disc     DDD (degenerative disc disease), lumbar     Degenerative disc disease 2017    Lower back    Depression     Fatigue     Fatty liver     Fibroid tumor     Right ovary-sm    Heartburn     IBS (irritable bowel syndrome)     Menorrhagia     Ulcer     Had an upper gi take Prilosec         PAST SURGICAL HISTORY:  Past Surgical History:   Procedure Laterality Date     SECTION  3/1997; 2000    CHOLECYSTECTOMY  2009    COLONOSCOPY      ESOPHAGOGASTRODUODENOSCOPY       ESOPHAGOGASTRODUODENOSCOPY  2014    gall bladder  11/2011    HYSTERECTOMY  11/30/2016    [facility]     TUBAL LIGATION  7/20/2000       FAMILY HISTORY:  Family History   Problem Relation Age of Onset    Cancer Mother     Bipolar disorder Mother     Drug abuse Mother     Ovarian cancer Mother     Kidney disease Father     Alcohol abuse Father     Drug abuse Brother     Colon cancer Maternal Grandmother     Cancer Maternal Grandmother     Obesity Maternal Grandmother     Stroke Maternal Grandmother     Cancer Maternal Aunt     Breast cancer Paternal Aunt        SOCIAL HISTORY:  Social History     Social History    Marital status:      Spouse name: N/A    Number of children: 2    Years of education: N/A     Occupational History    Not on file.     Social History Main Topics    Smoking status: Former Smoker     Packs/day: 0.50     Years: 6.00     Types: Cigarettes     Start date: 11/10/1996     Quit date: 6/9/2012    Smokeless tobacco: Never Used    Alcohol use 0.5 oz/week     1 Standard drinks or equivalent per week      Comment: Rarely    Drug use: No    Sexual activity: Yes     Partners: Male     Birth control/ protection: See Surgical Hx, Other-see comments      Comment: Hysterectomy     Other Topics Concern    Patient Feels They Ought To Cut Down On Drinking/Drug Use No    Patient Annoyed By Others Criticizing Their Drinking/Drug Use No    Patient Has Felt Bad Or Guilty About Drinking/Drug Use No    Patient Has Had A Drink/Used Drugs As An Eye Opener In The [place] No     Social History Narrative     17 years    2 children           MEDICATIONS:  Current Outpatient Prescriptions   Medication Sig Dispense Refill    amitriptyline (ELAVIL) 50 MG tablet Take 1.5 tablets (75 mg total) by mouth every evening. (Patient taking differently: Take 75 mg by mouth nightly as needed. ) 90 tablet 1    buPROPion (WELLBUTRIN) 100 MG tablet Take 1 tablet (100 mg total) by mouth 3 (three)  "times daily. 90 tablet 2    dicyclomine (BENTYL) 20 mg tablet TAKE 1 TABLET FOUR TIMES A DAY BEFORE MEALS AND NIGHTLY 360 tablet 0    eszopiclone 3 mg Tab Take 1 tablet (3 mg total) by mouth nightly as needed. 90 tablet 1    fluconazole (DIFLUCAN) 200 MG Tab Take 200 mg by mouth once.       hydrOXYzine pamoate (VISTARIL) 50 MG Cap Take 1 capsule (50 mg total) by mouth 2 (two) times daily as needed (ANXIETY OR INSOMNIA. MAY TAKE UP  MG AT A TIME IF NEEDED.). 180 capsule 1    ibuprofen (ADVIL,MOTRIN) 600 MG tablet Take 1 tablet (600 mg total) by mouth every 6 (six) hours as needed for Pain. 20 tablet 0    lubiprostone (AMITIZA) 8 MCG Cap Take 1 capsule (8 mcg total) by mouth 2 (two) times daily with meals. 180 capsule 1    omeprazole (PRILOSEC) 10 MG capsule Take 10 mg by mouth every morning.       topiramate (TOPAMAX) 50 MG tablet Take 1 tablet (50 mg total) by mouth 2 (two) times daily. 180 tablet 0     No current facility-administered medications for this visit.        ALLERGIES:  Review of patient's allergies indicates:  No Known Allergies    ROS:  Review of Systems   Constitutional: Negative for chills, fever and malaise/fatigue.   Respiratory: Negative for cough, shortness of breath and wheezing.    Cardiovascular: Negative for chest pain and palpitations.   Gastrointestinal: Positive for constipation, diarrhea and heartburn. Negative for abdominal pain, blood in stool, melena, nausea and vomiting.   Genitourinary: Negative for frequency and urgency.   Musculoskeletal: Positive for back pain. Negative for joint pain, myalgias and neck pain.   Neurological: Negative for dizziness, tingling, speech change, seizures and headaches.   Psychiatric/Behavioral: Negative for depression. The patient is nervous/anxious.      PE:  Vitals:    05/09/18 1249   BP: 118/78   Pulse: (!) 114   Weight: 109.4 kg (241 lb 2.9 oz)   Height: 5' 8" (1.727 m)     Physical Exam   Constitutional: She is oriented to person, " place, and time. She appears well-developed and well-nourished. No distress.   Morbidly obese   HENT:   Head: Normocephalic and atraumatic.   Eyes: Conjunctivae are normal. Right eye exhibits no discharge. Left eye exhibits no discharge. No scleral icterus.   Neck: Neck supple.   Cardiovascular: Normal rate, regular rhythm and normal heart sounds.  Exam reveals no friction rub.    No murmur heard.  tachycardia   Pulmonary/Chest: Effort normal and breath sounds normal. No respiratory distress. She has no wheezes. She has no rales.   Abdominal: Soft. Bowel sounds are normal. She exhibits no distension and no mass. There is no tenderness (epigastric and lower abd). There is no rebound and no guarding. No hernia.   Musculoskeletal: Normal range of motion. She exhibits no edema.   Neurological: She is alert and oriented to person, place, and time.   Skin: Skin is warm, dry and intact. Capillary refill takes less than 2 seconds. No rash noted. She is not diaphoretic. No erythema. No pallor.   Psychiatric: She has a normal mood and affect. Her speech is normal and behavior is normal. Judgment and thought content normal.   Nursing note and vitals reviewed.     DIAGNOSIS:  1. Obesity with Body mass index is 36.67 kg/m². and inability to losing and maintaining wt loss.  2. Co-morbidities: fatty liver, GERD, IBS, suspected sleep apnea (ESS17)  3. CHRISTINE, MDD, not well controlled.    PLAN:  The patient is a potential candidate for Bariatric Surgery. she is interested in sleeve gastrectomy with Dr Renee. We discussed that sleeve can worsen moderate to severe reflux, discussed bypass alternatively. Both surgeries and post-op care were discussed in detail with the patient. All questions were answered. She would like to proceed as a sleeve candidate, not bypass.    she understands that bariatric surgery is a tool to aid in weight loss and that she needs to be committed to the diet and exercise post-operatively for successful  weight loss.  Discussed expected weight loss outcomes after surgery which is 50% of the excess weight on her frame.  Goal weight is 196#s.  Discussed with patient that bariatric surgery is not the easy way out and that it will take plenty of dedication on the patient's part to be successful. Also discussed the possibility of weight regain if the patient strays from the diet guidelines or exercise requirements. Patient verbalized understanding and wishes to proceed with the work-up.    ORDERS:  1. Chest X-Ray and EKG, EGD  Sleep study 5/8/2018, pending interpretation  2. Bariatric Dietician Consult and PCP Clearance   Needs psych clearance from her provider. Outside letter provided.   3. Bariatric Labs: BMP, CBC, Folate Serum, H. pylori, HgA1C, Hepatic Panel/LFT, Iron & TIBC, Lipid Profile, Magnesium, Phosphate, T3, T4, TSH, Free T4, Vitamin B12, and Vitamin B1.  4. No NSAIDS after bariatric surgery d/t increased risk of gastric ulcers. Talk to prescribing provider for alternate pain management.   5. Medications for anxiety or depression. Reviewed pill crush/open capsule 3 months before surgery.     Primary Physician: Azalia Muir NP  RTC: As scheduled.    60 minute visit, over 50% of time spent counseling patient face to face on surgical options, risks, benefits, expected diet, recommended exercise regimen, and expected weight loss.

## 2018-05-09 NOTE — LETTER
[facility]  [address]  [facility] [address]  Phone: [phone]  Fax: [phone] May 11, 2018      Azalia Muir, NINA  [address] [facility] [address]    Patient: Anat Parks   MR Number: [id number]   YOB: 1980   Date of Visit: 5/9/2018     Dear Dr. Muir:    Thank you for referring Anat Parks to me for evaluation. Below are the relevant portions of my assessment and plan of care.    BARIATRIC NEW PATIENT EVALUATION     CHIEF COMPLAINT:   Obesity Body mass index is 36.67 kg/m². and inability to losing and maintaining wt loss.     HISTORY OF PRESENT ILLNESS:   Anat Parks  is a 38-year-old female presenting for obesity Body mass index is 36.67 kg/m². and inability to losing and maintaining wt loss . The patient has tried wt loss medications in conjunction with meal plans, 50 pounds regained when she stopped medications even with continuation of the meal plans. Has also tried walking 5 miles daily, only lost 15#. Lost 10# with Paleo plan, which she then regained. Currently follows with Dr. Robles for wt management and has 17# pounds with medication. Wt difficulty began in childhood. Today is her highest weight.      GERD. On PPI for years. SX once weekly with trigger foods. + h/o gastric ulcers 2005 and then again 2014.    DIAGNOSIS:  1. Obesity with Body mass index is 36.67 kg/m². and inability to losing and maintaining wt loss.  2. Co-morbidities: fatty liver, GERD, IBS, suspected sleep apnea (ESS17)  3. CHRISTINE, MDD, not well controlled.     PLAN: The patient is a potential candidate for Bariatric Surgery. she is interested in sleeve gastrectomy with Dr Renee. We discussed that sleeve can worsen moderate to severe reflux, discussed bypass alternatively. Both surgeries and post-op care were discussed in detail with the patient. All questions were answered. She would like to proceed as a sleeve candidate, not bypass.     She understands that  Bariatric Surgery is a tool to aid in weight loss and that she needs to be committed to the diet and exercise post-operatively for successful weight loss.  Discussed expected weight loss outcomes after surgery which is 50% of the excess weight on her frame.  Goal weight is 196#s.  Discussed with patient that bariatric surgery is not the easy way out and that it will take plenty of dedication on the patient's part to be successful. Also discussed the possibility of weight regain if the patient strays from the diet guidelines or exercise requirements. Patient verbalized understanding and wishes to proceed with the work-up.     ORDERS:  1. Chest X-Ray and EKG, EGD  Sleep study 5/8/2018, pending interpretation  2. Bariatric Dietician Consult and PCP Clearance   Needs psych clearance from her provider. Outside letter provided.   3. Bariatric Labs: BMP, CBC, Folate Serum, H. pylori, HgA1C, Hepatic Panel/LFT, Iron & TIBC, Lipid Profile, Magnesium, Phosphate, T3, T4, TSH, Free T4, Vitamin B12, and Vitamin B1.  4. No NSAIDS after bariatric surgery d/t increased risk of gastric ulcers. Talk to prescribing provider for alternate pain management.   5. Medications for anxiety or depression. Reviewed pill crush/open capsule 3 months before surgery.     If you have questions, please do not hesitate to call me. I look forward to following Anat along with you.    Sincerely,    LINDA Guaman   Section of Bariatric Surgery  [facility]    BOOKER/mally

## 2018-05-09 NOTE — PATIENT INSTRUCTIONS
Prior to surgery you will need to complete:  - Dietitian consult and follow up appointments as needed  - PCP clearance  - Labs  - Chest X-ray  - EKG  - Psychological evaluation, please provide letter to your provider.  - EGD    In preparation for bariatric surgery, please complete the following:   · Discuss your current medications with your primary care provider, remember your medications will need to be crushed, chewable, or in liquid form for the first 3-6 months after a gastric bypass or sleeve.  For a gastric band, your medications will need to be crushed indefinitely.    · If you take a blood thinner such as: Coumadin (warfarin), Pradaxa (dabigatran), or Plavix (clopidogrel), you will need to speak with your prescribing provider on how or if this medication can be stopped before surgery.   · If you take a medication for depression or anxiety, you will need to begin crushing or opening the capsule 1-3 months prior to surgery.  Remember to discuss this with the psychologist or psychiatrist that you see.   · If you take medication for arthritis on a daily basis that is considered a non-steroidal anti-inflammatory (NSAID), please discuss with your prescribing physician an alternative medication.  After having gastric bypass or gastric sleeve, this group of medications is not appropriate to take due to increased risk of bleeding stomach ulcers.      DEFINITIONS  Appointments: Pre-scheduled meetings or consultations with any physician, advanced practice provider, dietitian, or psychologist, and labs, imaging studies, sleep studies, etc.   Late cancellation: Cancelling an appointment 24-48 hours prior to scheduled time.  No-Show appointment:  is when    You do NOT arrive to your appointment at the time its scheduled.   You call to cancel or cancel via [facility] less than 24 hours in advance of your scheduled appointment   You show up 15 minutes AFTER your scheduled appointment time without any notification of  being late.     POLICY  1. You are allowed up to 3 cancellations for appointments.    After 3 cancellations your case will be placed on hold for 2 months and after that time you can resume the program.   2. You are allowed only 1 no-show for an appointment.    You will be re-scheduled one time and if there is a 2nd no-show at any point, your case will be placed on hold for 3 months.  After 3 months you can resume the program.     3. Upon resuming the program after being placed on hold for either above mentioned reasons, if you have a late cancel or no show for any appointment, the bariatric team will review if youre an appropriate candidate for surgery at the monthly meeting.

## 2018-05-14 ENCOUNTER — PATIENT MESSAGE (OUTPATIENT)
Dept: INTERNAL MEDICINE | Facility: CLINIC | Age: 38
End: 2018-05-14

## 2018-05-28 RX ORDER — BUPROPION HYDROCHLORIDE 100 MG/1
TABLET ORAL
Qty: 90 TABLET | Refills: 2 | Status: SHIPPED | OUTPATIENT
Start: 2018-05-28 | End: 2018-08-20 | Stop reason: SDUPTHER

## 2018-06-08 ENCOUNTER — PATIENT MESSAGE (OUTPATIENT)
Dept: INTERNAL MEDICINE | Facility: CLINIC | Age: 38
End: 2018-06-08

## 2018-06-15 ENCOUNTER — PATIENT MESSAGE (OUTPATIENT)
Dept: BARIATRICS | Facility: CLINIC | Age: 38
End: 2018-06-15

## 2018-06-15 ENCOUNTER — TELEPHONE (OUTPATIENT)
Dept: BARIATRICS | Facility: CLINIC | Age: 38
End: 2018-06-15

## 2018-06-15 NOTE — TELEPHONE ENCOUNTER
----- Message from Dante Miles sent at 6/15/2018  9:25 AM CDT -----  Pt said she received some test results and wanted to know what her next step was. Pt can be reached at [phone]

## 2018-06-22 ENCOUNTER — PATIENT MESSAGE (OUTPATIENT)
Dept: BARIATRICS | Facility: CLINIC | Age: 38
End: 2018-06-22

## 2018-07-10 ENCOUNTER — OFFICE VISIT (OUTPATIENT)
Dept: PSYCHIATRY | Facility: CLINIC | Age: 38
End: 2018-07-10
Payer: OTHER GOVERNMENT

## 2018-07-10 VITALS
HEART RATE: 103 BPM | BODY MASS INDEX: 36.42 KG/M2 | HEIGHT: 68 IN | WEIGHT: 240.31 LBS | DIASTOLIC BLOOD PRESSURE: 84 MMHG | SYSTOLIC BLOOD PRESSURE: 106 MMHG | RESPIRATION RATE: 20 BRPM

## 2018-07-10 DIAGNOSIS — F33.9 EPISODE OF RECURRENT MAJOR DEPRESSIVE DISORDER, UNSPECIFIED DEPRESSION EPISODE SEVERITY: ICD-10-CM

## 2018-07-10 DIAGNOSIS — E66.9 OBESITY (BMI 30.0-34.9): ICD-10-CM

## 2018-07-10 DIAGNOSIS — F41.0 PANIC DISORDER: ICD-10-CM

## 2018-07-10 DIAGNOSIS — F51.04 PSYCHOPHYSIOLOGICAL INSOMNIA: Primary | ICD-10-CM

## 2018-07-10 DIAGNOSIS — F41.1 GAD (GENERALIZED ANXIETY DISORDER): ICD-10-CM

## 2018-07-10 PROCEDURE — 99999 PR PBB SHADOW E&M-EST. PATIENT-LVL III: CPT | Mod: PBBFAC,,, | Performed by: PSYCHIATRY & NEUROLOGY

## 2018-07-10 PROCEDURE — 99213 OFFICE O/P EST LOW 20 MIN: CPT | Mod: S$PBB,,, | Performed by: PSYCHIATRY & NEUROLOGY

## 2018-07-10 PROCEDURE — 90833 PSYTX W PT W E/M 30 MIN: CPT | Mod: S$PBB,,, | Performed by: PSYCHIATRY & NEUROLOGY

## 2018-07-10 PROCEDURE — 99213 OFFICE O/P EST LOW 20 MIN: CPT | Mod: PBBFAC | Performed by: PSYCHIATRY & NEUROLOGY

## 2018-07-10 PROCEDURE — 90833 PSYTX W PT W E/M 30 MIN: CPT | Mod: PBBFAC | Performed by: PSYCHIATRY & NEUROLOGY

## 2018-07-10 RX ORDER — ALPRAZOLAM 0.5 MG/1
0.5 TABLET ORAL 2 TIMES DAILY PRN
Qty: 60 TABLET | Refills: 2 | Status: SHIPPED | OUTPATIENT
Start: 2018-07-10 | End: 2018-10-08 | Stop reason: SDUPTHER

## 2018-07-10 RX ORDER — AMITRIPTYLINE HYDROCHLORIDE 75 MG/1
75 TABLET ORAL NIGHTLY
Qty: 90 TABLET | Refills: 0 | Status: SHIPPED | OUTPATIENT
Start: 2018-07-10 | End: 2018-09-24 | Stop reason: SDUPTHER

## 2018-07-10 NOTE — PROGRESS NOTES
"Outpatient Psychiatry Follow-Up Visit (MD/NP)    7/10/2018    Clinical Status of Patient:  Outpatient (Ambulatory)    Chief Complaint:  Anat Parks is a 38 y.o. female who presents today for follow-up of depression and anxiety.  Met with patient and spouse.      Interval History and Content of Current Session:  Interim Events/Subjective Report/Content of Current Session:   The patient was seen and her chart reviewed.    She has been compliant with treatment. She had no adverse reactions or side effects. Vistaril was minimally effective in controlling a panic attack- she had previous success with Xanax. She tolerated the previous medication changes well.     No new stressors were presented today. Her marriage is going well. She continues to deal with family struggles (balancing work/home/life responsibilities). Her  previously had a successful tx with the spinal stimulator for his chronic pain (some recent adjustments were made)- he is doing better with his pain, but he has continued PTSD-related issues. She quit her previous job secondary to stress and started new employment in housekeeping- her new job is going well. Her youngest son is going to attend [employer] in August. Her oldest is leaving the [employer]- he is seeking new employment.      She had no new medical issues since she was last seen. She and her family are working on their diet and have been losing weight- she is in the process of trying to get approved for gastric by-pass surgery.  She had a sleep study- was diagnosed with mild ERICH and recommended to loose weight to correct it. .     "I've been ok.. Everything seems to be doing good."     Improved/Controlled Symptoms of Depression: no diminished mood, no loss of interest/anhedonia; no irritability, no diminished energy, no change in sleep (see below; improved overall), no change in appetite, no diminished concentration or cognition or indecisiveness, no PMA/R, no excessive " guilt, denied hopelessness/worthlessness, no suicidal ideations    Controlled Sleep disturbance: no trouble with initiation (relieved with medications- may happen occasionally; may be getting some tolerance to the lunesta), no issues maintenance, no early morning awakening with inability to return to sleep; getting about 8-12 hours per night on average. She still requires the lunesta about 2-3 times per week.     No Suicidal/Homicidal ideations: no active/passive ideations, organized plans, or future intentions; no past attempts.     Denied Symptoms of CHRISTINE: no excessive anxiety/worry/fear, no restlessness, no fatigue, no poor concentration, no irritability, no muscle tension, no sleep disturbance     Mostly Controlled but persistent symptoms of Panic Disorder: she is now averaging about 1 panic attack per week (may have initially increased seocndary to changes in Wellbutrin?)- all were triggered but somewhat difficult to manage Vistaril 100 mg was ineffective at controlling symptoms.  She had previous success with xanax    No Symptoms of sexual disorders: no decrease in libido/desire (better); no issues and orgasmic (better); no pain    Overall, her IBS persists but is controlled; Back pain is better.     Weight: she is working on dieting to lose weight. Her weight on 10/31/17 was 254 lbs; weight on 2/7/18 was 240 lbs; weight on 4/30 is 241 lbs. She is still pursuing gastric bypass surgery- her next appointment is in 6/18' they are trying to get it approved.      Previous medication trials include luvox (increased irritability).    PSYCHOTHERAPY ADD-ON +[id number]   30 (16-37*) minutes    Time: 16 minutes  Participants: Met with patient    Therapeutic Intervention Type: insight oriented psychotherapy, behavior modifying psychotherapy, supportive psychotherapy  Why chosen therapy is appropriate versus another modality: relevant to diagnosis, patient responds to this modality, evidence based practice    Target symptoms:  depression, anxiety   Primary focus: anxiety, psychosocial stressors  Psychotherapeutic techniques: supportive techniques; psycho-education    Outcome monitoring methods: self-report, observation    Patient's response to intervention:  The patient's response to intervention is accepting.    Progress toward goals:  The patient's progress toward goals is good.              Review of Systems   · PSYCHIATRIC: Pertinant items are noted in the narrative.  · CONSTITUTIONAL: No weight gain or loss.   · MUSCULOSKELETAL: No pain or stiffness of the joints.  · NEUROLOGIC: No weakness, sensory changes, seizures, confusion, memory loss, tremor or other abnormal movements.  · ENDOCRINE: No polydipsia or polyuria.  · INTEGUMENTARY: No rashes or lacerations.  · EYES: No exophthalmos, jaundice or blindness.  · ENT: No dizziness, tinnitus or hearing loss.  · RESPIRATORY: No shortness of breath.  · CARDIOVASCULAR: No tachycardia or chest pain.  · GASTROINTESTINAL: No nausea, vomiting, pain, constipation or diarrhea.  · GENITOURINARY: No frequency, dysuria or sexual dysfunction.  · HEMATOLOGIC/LYMPHATIC: No excessive bleeding, prolonged or excessive bleeding after dental extraction/injury.  · ALLERGIC/IMMUNOLOGIC: No allergic response to materials, foods or animals at this time.    Past Medical, Family and Social History: The patient's past medical, family and social history have been reviewed and updated as appropriate within the electronic medical record - see encounter notes.    Compliance: yes    Side effects: None    Risk Parameters:  Patient reports no suicidal ideation  Patient reports no homicidal ideation  Patient reports no self-injurious behavior  Patient reports no violent behavior           Exam (detailed: at least 9 elements; comprehensive: all 15 elements)   Constitutional  Vitals:  Most recent vital signs, dated less than 90 days prior to this appointment, were reviewed.   Vitals:    07/10/18 1105   BP: 106/84   Pulse:  "103   Resp: 20   Weight: 109 kg (240 lb 4.8 oz)   Height: 5' 8" (1.727 m)     Body mass index is 36.54 kg/m².         General:  unremarkable, age appropriate, casually dressed, neatly groomed, obese     Musculoskeletal  Muscle Strength/Tone:  no spasicity, no rigidity, no dyskinesia, no dystonia, no tremor   Gait & Station:  non-ataxic     Psychiatric  Speech:  no latency; no press, spontaneous   Mood & Affect:  steady "ok.. good"   congruent and appropriate, mood-congruent, full   Thought Process:  normal and logical, goal-directed   Associations:  intact   Thought Content:  normal, no suicidality, no homicidality, delusions, or paranoia   Insight:  intact, has awareness of illness   Judgement: behavior is adequate to circumstances, age appropriate   Orientation:  person, place, situation, time/date, day of week, month of year, year   Memory: intact for content of interview, able to remember recent events- yes, able to remember remote events- yes   Language: grossly intact, able to name, able to repeat   Attention Span & Concentration:  able to focus, completed tasks   Fund of Knowledge:  intact and appropriate to age and level of education, familiar with aspects of current personal life, 4 of 4 recent presidents     Assessment and Diagnosis   Status/Progress: Based on the examination today, the patient's problem(s) is/are improved and adequately but not ideally controlled.  New problems have not been presented today.   Co-morbidities are not complicating management of the primary condition.  There are no active rule-out diagnoses for this patient at this time.     General Impression:  MDD, recurrent, moderate, in full remission  CHRISTINE  Panic Disorder without agoraphobia   Insomnia    IBS, GERD, ovarian cysts, obesity      Intervention/Counseling/Treatment Plan   Treatment Plan/Recommendations:     Medications:  -Continue Wellbutrin  mg po TID for depression and off-label CHRISTINE.   -Continue Lunesta 3 mg Po q HS " prn insomnia; will taper down when able- rarely needed over the last month  -Continue Elavil 75 mg po q HS for adjunctive depression, anxiety and IBS  -Continue Topamax 50 mg po BID for off-label mood/anxiety and weight loss  -Trial of Xanax 0.5 mg po BID prn anxiety       -Continue Probiotic for adjunctive depression     Discussed diagnosis, risks and benefits of proposed treatment vs alternative treatments vs no treatment, and potential side effects of these treatments.  The patient expresses understanding of the above and displays the capacity to agree with this treatment given said understanding.  Patient also agrees that, currently, the benefits outweigh the risks and would like to pursue treatment at this time.    Therapy:  Pt is not interested in regular scheduled appointments at this time; psychotherapy provided today    Counseled:  Counseled on illness and tx options as well as exercise for MH   Counseled on weight loss    Labs:   Labs reviewed    Return to Clinic: 3 months, sooner if needed     Yuriy Vaz MD

## 2018-07-19 DIAGNOSIS — G47.33 OSA (OBSTRUCTIVE SLEEP APNEA): ICD-10-CM

## 2018-07-19 DIAGNOSIS — E66.01 MORBID OBESITY: Primary | ICD-10-CM

## 2018-07-23 ENCOUNTER — HOSPITAL ENCOUNTER (OUTPATIENT)
Dept: RADIOLOGY | Facility: HOSPITAL | Age: 38
Discharge: HOME | End: 2018-07-23
Attending: NURSE PRACTITIONER
Payer: OTHER GOVERNMENT

## 2018-07-23 ENCOUNTER — HOSPITAL ENCOUNTER (OUTPATIENT)
Dept: CARDIOLOGY | Facility: CLINIC | Age: 38
Discharge: HOME | End: 2018-07-23
Payer: OTHER GOVERNMENT

## 2018-07-23 ENCOUNTER — CLINICAL SUPPORT (OUTPATIENT)
Dept: BARIATRICS | Facility: CLINIC | Age: 38
End: 2018-07-23
Payer: OTHER GOVERNMENT

## 2018-07-23 VITALS — WEIGHT: 231.5 LBS | HEIGHT: 68 IN | BODY MASS INDEX: 35.09 KG/M2

## 2018-07-23 DIAGNOSIS — E66.9 OBESITY (BMI 30-39.9): ICD-10-CM

## 2018-07-23 DIAGNOSIS — F51.04 PSYCHOPHYSIOLOGICAL INSOMNIA: ICD-10-CM

## 2018-07-23 DIAGNOSIS — Z71.3 DIETARY COUNSELING: ICD-10-CM

## 2018-07-23 DIAGNOSIS — E66.9 CLASS 2 OBESITY WITH BODY MASS INDEX (BMI) OF 35.0 TO 35.9 IN ADULT, UNSPECIFIED OBESITY TYPE, UNSPECIFIED WHETHER SERIOUS COMORBIDITY PRESENT: ICD-10-CM

## 2018-07-23 DIAGNOSIS — G47.8 SLEEP AROUSAL DISORDER: ICD-10-CM

## 2018-07-23 PROCEDURE — 99499 UNLISTED E&M SERVICE: CPT | Mod: S$PBB,,, | Performed by: DIETITIAN, REGISTERED

## 2018-07-23 PROCEDURE — 71046 X-RAY EXAM CHEST 2 VIEWS: CPT | Mod: TC,FY

## 2018-07-23 PROCEDURE — 99212 OFFICE O/P EST SF 10 MIN: CPT | Mod: PBBFAC,25 | Performed by: DIETITIAN, REGISTERED

## 2018-07-23 PROCEDURE — 97802 MEDICAL NUTRITION INDIV IN: CPT | Mod: PBBFAC | Performed by: DIETITIAN, REGISTERED

## 2018-07-23 PROCEDURE — 99999 PR PBB SHADOW E&M-EST. PATIENT-LVL II: CPT | Mod: PBBFAC,,, | Performed by: DIETITIAN, REGISTERED

## 2018-07-23 PROCEDURE — 71046 X-RAY EXAM CHEST 2 VIEWS: CPT | Mod: 26,,, | Performed by: RADIOLOGY

## 2018-07-23 PROCEDURE — 93005 ELECTROCARDIOGRAM TRACING: CPT | Mod: PBBFAC | Performed by: INTERNAL MEDICINE

## 2018-07-23 PROCEDURE — 93010 ELECTROCARDIOGRAM REPORT: CPT | Mod: S$PBB,,, | Performed by: INTERNAL MEDICINE

## 2018-07-23 NOTE — PROGRESS NOTES
"NUTRITIONAL CONSULT    Referring Physician: Kale Renee M.D.  Reason for MNT Referral: Initial assessment for laparoscopic Adilosn-en-Y work-up    PAST MEDICAL HISTORY:   38 y.o. female presents with a BMI of Body mass index is 35.2 kg/m²..  Weight history includes 170 lbs when seeing "diet doctor" with meds and stopped seeing him and wt slowly came back, after hysterectomy gained 40lbs nov 2016.  Dieting attempts include wt loss medications in conjunction with meal plans, 50 pounds regained when she stopped medications even with continuation of the meal plans. Has also tried walking 5 miles daily, only lost 15#. Lost 10# with Paleo plan, which she then regained. Currently follows with Dr. Robles for wt management and has lost 30# pounds with medications. Wt difficulty began in childhood.    Past Medical History:   Diagnosis Date    Abdominal pain, other specified site     Right lower abdomen    Acid reflux     Anxiety     Bilateral ovarian cysts     Mostly on right, but left also    Bulging lumbar disc     DDD (degenerative disc disease), lumbar     Degenerative disc disease Nov 2017    Lower back    Depression     Fatigue     Fatty liver     Fibroid tumor     Right ovary-sm    Heartburn     IBS (irritable bowel syndrome)     Menorrhagia     Ulcer 2014    Had an upper gi take Prilosec       CLINICAL DATA:  38 y.o.-year-old White female.  Height: 5'8"  Weight: 232  lbs  IBW: 150 lbs  BMI: 35.2  The patient's goal weight ( 50 % EBW): 191 lbs  Personal goal weight: 180 lbs    Goal for Bariatric Surgery: to improve health, to improve quality of life, to lose weight and help with knees and SOB    NUTRITION & HEALTH HISTORY:  Greatest challenge: sweets    Current diet recall: B-yogurt or cottage cheese and peaches or fruit  S- none  L - cottage cheese with veggies or veggie noodles, chicken or   S- cheese stick or veggies or olives  D- salsa chicken, lettuce or steak, peas, broccoli, cauliflower rice " or burger with sweet potato fries  S - gluten-free cookies or SF ice cream    Current Diet:  Meal pattern: 3 meals  Protein supplements: none  Snackin-3 / day  Vegetables: Likes a variety. Eats daily.  Fruits: Likes a variety. Eats daily.  Beverages: water, sweet tea and coffee with sugar  Dining out: Monthly. Mostly restaurants.  Cooking at home: Daily. Mostly baked and grilled meat, fish and vegetables.    Exercise:  Past exercise: Adequate    Current exercise: Adequate - walks dogs, gardens and at work  Restrictions to exercise: none    Vitamins / Minerals / Herbs:   Alive women energy MV    Food Allergies:   none    Social:  Works regular daytime shifts.  Lives with 2 sons and .  Grocery shopping and food prep patient completes.  Patient believes the household will be supportive after surgery.  Alcohol: Socially.  Smoking: quit 6 years ago    ASSESSMENT:  · Patient reports attempts at weight loss, only to regain lost weight.  · Patient demonstrated knowledge of healthy eating behaviors and exercise patterns; admits to not eating healthy and not exercising at this point.  · Patient states willingness and demonstrates willingness to change lifestyle and make behavior modifications.  · Expect good  compliance after surgery at this time.    Insurance requires medically supervised diet prior to consideration for bariatric surgery.    BARIATRIC DIET DISCUSSION:  Discussed diet after surgery and related to patients food record.  Reviewed diet progression before and after surgery.  Reinforced that surgery is not a magic bullet and importance of low fat foods and no snacking.  Stressed importance of exercise and its role in achieving weight loss goals.  Answered all questions.    RECOMMENDATIONS:  Patient is a good candidate for bariatric surgery.    Does not Need additional visit(s) with RD for insurance.    PLAN:  Continue to review Bariatric Nutrition Guidebook at home and call with any questions.  Work on  Bariatric Nutrition Checklist.  Begin trying various protein supplements to determine preference.  5-6 meals per day.  Start including protein supplements in the diet plan daily.  Start shopping for bariatric vitamins & minerals.  Return to clinic.    - Keep food log    SESSION TIME:  60 minutes

## 2018-07-23 NOTE — PATIENT INSTRUCTIONS
Goals:   - Continue to review Bariatric Nutrition Guidebook at home and call with any questions.  - Work on Bariatric Nutrition Checklist.  - Begin trying various protein supplements to determine preference.  - 5-6 meals per day.  - Start including protein supplements in the diet plan daily.  - Start shopping for bariatric vitamins & minerals.  - Keep food log

## 2018-07-24 ENCOUNTER — TELEPHONE (OUTPATIENT)
Dept: BARIATRICS | Facility: CLINIC | Age: 38
End: 2018-07-24

## 2018-07-24 DIAGNOSIS — R94.31 ABNORMAL EKG: Primary | ICD-10-CM

## 2018-07-24 RX ORDER — ERGOCALCIFEROL 1.25 MG/1
50000 CAPSULE ORAL
Qty: 24 CAPSULE | Refills: 0 | Status: SHIPPED | OUTPATIENT
Start: 2018-07-26 | End: 2018-10-25 | Stop reason: DRUGHIGH

## 2018-07-25 ENCOUNTER — TELEPHONE (OUTPATIENT)
Dept: BARIATRICS | Facility: CLINIC | Age: 38
End: 2018-07-25

## 2018-07-25 NOTE — TELEPHONE ENCOUNTER
----- Message from LINDA Guaman sent at 7/24/2018 10:41 AM CDT -----  Abnormal EKG. Schedule 2D echo.

## 2018-07-27 ENCOUNTER — TELEPHONE (OUTPATIENT)
Dept: ENDOSCOPY | Facility: HOSPITAL | Age: 38
End: 2018-07-27

## 2018-08-06 ENCOUNTER — HOSPITAL ENCOUNTER (OUTPATIENT)
Dept: CARDIOLOGY | Facility: CLINIC | Age: 38
Discharge: HOME | End: 2018-08-06
Attending: NURSE PRACTITIONER
Payer: OTHER GOVERNMENT

## 2018-08-06 ENCOUNTER — TELEPHONE (OUTPATIENT)
Dept: BARIATRICS | Facility: CLINIC | Age: 38
End: 2018-08-06

## 2018-08-06 DIAGNOSIS — R94.31 ABNORMAL EKG: ICD-10-CM

## 2018-08-06 LAB
AORTIC VALVE REGURGITATION: NORMAL
DIASTOLIC DYSFUNCTION: NO
RETIRED EF AND QEF - SEE NOTES: 58 (ref 55–65)

## 2018-08-06 PROCEDURE — 93306 TTE W/DOPPLER COMPLETE: CPT | Mod: PBBFAC | Performed by: INTERNAL MEDICINE

## 2018-08-13 ENCOUNTER — TELEPHONE (OUTPATIENT)
Dept: BARIATRICS | Facility: CLINIC | Age: 38
End: 2018-08-13

## 2018-08-13 DIAGNOSIS — F33.9 EPISODE OF RECURRENT MAJOR DEPRESSIVE DISORDER, UNSPECIFIED DEPRESSION EPISODE SEVERITY: ICD-10-CM

## 2018-08-13 DIAGNOSIS — E66.9 OBESITY, UNSPECIFIED CLASSIFICATION, UNSPECIFIED OBESITY TYPE, UNSPECIFIED WHETHER SERIOUS COMORBIDITY PRESENT: Primary | ICD-10-CM

## 2018-08-13 DIAGNOSIS — F41.1 GAD (GENERALIZED ANXIETY DISORDER): ICD-10-CM

## 2018-08-13 DIAGNOSIS — F51.04 PSYCHOPHYSIOLOGICAL INSOMNIA: ICD-10-CM

## 2018-08-13 DIAGNOSIS — G47.8 SLEEP AROUSAL DISORDER: ICD-10-CM

## 2018-08-13 NOTE — TELEPHONE ENCOUNTER
Returned patient call.   She stated that she has lost weight in the process and is concerned that if her wt goes under 35 BMI that she will not qualify for surgery.  Notified patient that the insurance and the department looks at the initial wt at the first visit.  Reviewed chart and patient needs EGD which is scheduled for 8/15; nutrition clearance and psych clearance (letter from Dr. Vaz clearing patient- emailed Dr. Mendez to see if formal eval and testing needed per her or Dr. Lara) patient also wants to switch to [facility].  Emailed Dr. Renee to review as well as Yessica - and call patient to discuss once decision is made on the type of surgery that would best benefit patient.  Patient verbalized understanding of the process and needed testing.

## 2018-08-13 NOTE — TELEPHONE ENCOUNTER
----- Message from Maricarmen Dean sent at 8/13/2018  8:27 AM CDT -----  Contact: Pt.Self   Pt. States she would like to speak with nurse in reference to finding out what lowest BMI is in order to qualify for weight loss surgery please call Pt. Back after 3:30Pm @ [phone] Thank You

## 2018-08-13 NOTE — TELEPHONE ENCOUNTER
----- Message from Maricarmen Dean sent at 8/13/2018  8:27 AM CDT -----  Contact: Pt.Self   Pt. States she would like to speak with nurse in reference to finding out what lowest BMI is in order to qualify for weight loss surgery please call Pt. Back after 3:30Pm @ [phone] Thank You

## 2018-08-14 NOTE — TELEPHONE ENCOUNTER
Damari Mendez, PhD  Suzanne Steven, MALKA             She should be put through the protocol like everyone else. Especially with psych issues going on. Is there some insurance reason she slipped through the cracks? If not, please direct Link to schedule her for testing and meeting with me. Thanks.    Previous Messages      ----- Message -----   From: Suzanne Steven RN   Sent: 8/13/2018   6:44 PM   To: Damari Mendez, PhD     Not sure why this patient was not required to see you.  Can you review her psych history and advise if she should do testing or if clearance from Dr. Vaz is sufficient when provided.         Thanks!       Suzanne

## 2018-08-14 NOTE — TELEPHONE ENCOUNTER
Called patient and informed her that she will need psych testing and eval per protocol with one of our team psychologists.  Referral entered and phone number to call the psych department given to patient

## 2018-08-15 ENCOUNTER — ANESTHESIA (OUTPATIENT)
Dept: ENDOSCOPY | Facility: HOSPITAL | Age: 38
End: 2018-08-15
Payer: OTHER GOVERNMENT

## 2018-08-15 ENCOUNTER — HOSPITAL ENCOUNTER (OUTPATIENT)
Facility: HOSPITAL | Age: 38
Discharge: HOME | End: 2018-08-15
Attending: INTERNAL MEDICINE | Admitting: INTERNAL MEDICINE
Payer: OTHER GOVERNMENT

## 2018-08-15 ENCOUNTER — ANESTHESIA EVENT (OUTPATIENT)
Dept: ENDOSCOPY | Facility: HOSPITAL | Age: 38
End: 2018-08-15
Payer: OTHER GOVERNMENT

## 2018-08-15 VITALS
HEART RATE: 80 BPM | RESPIRATION RATE: 16 BRPM | DIASTOLIC BLOOD PRESSURE: 74 MMHG | TEMPERATURE: 99 F | OXYGEN SATURATION: 99 % | SYSTOLIC BLOOD PRESSURE: 128 MMHG

## 2018-08-15 DIAGNOSIS — K21.9 GASTROESOPHAGEAL REFLUX DISEASE, ESOPHAGITIS PRESENCE NOT SPECIFIED: ICD-10-CM

## 2018-08-15 DIAGNOSIS — K21.9 GERD (GASTROESOPHAGEAL REFLUX DISEASE): ICD-10-CM

## 2018-08-15 DIAGNOSIS — E66.9 OBESITY (BMI 30.0-34.9): Primary | ICD-10-CM

## 2018-08-15 PROCEDURE — 63600175 PHARM REV CODE 636 W HCPCS: Performed by: NURSE ANESTHETIST, CERTIFIED REGISTERED

## 2018-08-15 PROCEDURE — 00731 ANES UPR GI NDSC PX NOS: CPT | Performed by: INTERNAL MEDICINE

## 2018-08-15 PROCEDURE — 37000009 HC ANESTHESIA EA ADD 15 MINS: Performed by: INTERNAL MEDICINE

## 2018-08-15 PROCEDURE — E9220 PRA ENDO ANESTHESIA: HCPCS | Mod: ,,, | Performed by: NURSE ANESTHETIST, CERTIFIED REGISTERED

## 2018-08-15 PROCEDURE — 37000008 HC ANESTHESIA 1ST 15 MINUTES: Performed by: INTERNAL MEDICINE

## 2018-08-15 PROCEDURE — 88305 TISSUE EXAM BY PATHOLOGIST: CPT | Mod: 26,,, | Performed by: PATHOLOGY

## 2018-08-15 PROCEDURE — 88342 IMHCHEM/IMCYTCHM 1ST ANTB: CPT | Mod: 26,,, | Performed by: PATHOLOGY

## 2018-08-15 PROCEDURE — 43239 EGD BIOPSY SINGLE/MULTIPLE: CPT | Mod: ,,, | Performed by: INTERNAL MEDICINE

## 2018-08-15 PROCEDURE — 25000003 PHARM REV CODE 250: Performed by: INTERNAL MEDICINE

## 2018-08-15 PROCEDURE — 88305 TISSUE EXAM BY PATHOLOGIST: CPT | Mod: 59 | Performed by: PATHOLOGY

## 2018-08-15 PROCEDURE — 27201012 HC FORCEPS, HOT/COLD, DISP: Performed by: INTERNAL MEDICINE

## 2018-08-15 PROCEDURE — 88342 IMHCHEM/IMCYTCHM 1ST ANTB: CPT | Performed by: PATHOLOGY

## 2018-08-15 PROCEDURE — 43239 EGD BIOPSY SINGLE/MULTIPLE: CPT | Performed by: INTERNAL MEDICINE

## 2018-08-15 RX ORDER — LIDOCAINE HCL/PF 100 MG/5ML
SYRINGE (ML) INTRAVENOUS
Status: DISCONTINUED | OUTPATIENT
Start: 2018-08-15 | End: 2018-08-15

## 2018-08-15 RX ORDER — SODIUM CHLORIDE 0.9 % (FLUSH) 0.9 %
3 SYRINGE (ML) INJECTION
Status: DISCONTINUED | OUTPATIENT
Start: 2018-08-15 | End: 2018-08-15 | Stop reason: HOSPADM

## 2018-08-15 RX ORDER — SODIUM CHLORIDE 9 MG/ML
INJECTION, SOLUTION INTRAVENOUS CONTINUOUS
Status: DISCONTINUED | OUTPATIENT
Start: 2018-08-15 | End: 2018-08-15 | Stop reason: HOSPADM

## 2018-08-15 RX ORDER — PROPOFOL 10 MG/ML
VIAL (ML) INTRAVENOUS
Status: DISCONTINUED | OUTPATIENT
Start: 2018-08-15 | End: 2018-08-15

## 2018-08-15 RX ADMIN — LIDOCAINE HYDROCHLORIDE 80 MG: 20 INJECTION, SOLUTION INTRAVENOUS at 01:08

## 2018-08-15 RX ADMIN — SODIUM CHLORIDE: 0.9 INJECTION, SOLUTION INTRAVENOUS at 01:08

## 2018-08-15 RX ADMIN — PROPOFOL 100 MG: 10 INJECTION, EMULSION INTRAVENOUS at 01:08

## 2018-08-15 NOTE — H&P
Short Stay Endoscopy History and Physical    PCP - Azalia Muir NP     Procedure - EGD  ASA - per anesthesia  Mallampati - per anesthesia  History of Anesthesia problems - no  Family history Anesthesia problems -  no   Plan of anesthesia - General / MAC    HPI:  This is a 38 y.o. female here for evaluation of :   Abdominal pain.  Pre-op for gastric surgery.  Hx of reported reflux and IBS.       Reflux - none recent  Dysphagia - no  Abdominal pain - intermittent  Diarrhea - intermittent    ROS:  Constitutional: No fevers, chills, No weight loss  CV: No chest pain  Pulm: No cough, No shortness of breath  Ophtho: No vision changes  GI: see HPI  Derm: No rash    Medical History:  has a past medical history of Abdominal pain, other specified site, Acid reflux, Anxiety, Bilateral ovarian cysts, Bulging lumbar disc, DDD (degenerative disc disease), lumbar, Degenerative disc disease (2017), Depression, Fatigue, Fatty liver, Fibroid tumor, Heartburn, IBS (irritable bowel syndrome), Menorrhagia, and Ulcer ().    Surgical History:  has a past surgical history that includes Tubal ligation (2000);  section (3/1997; 2000); gall bladder (2011); Colonoscopy (); Esophagogastroduodenoscopy (); Esophagogastroduodenoscopy (); Hysterectomy (2016); Cholecystectomy (2009); HYSTERECTOMY-TOTAL LAPAROSCOPIC (TLH) (N/A, 2016); CYSTOSCOPY (N/A, 2016); SALPINGECTOMY-LAPAROSCOPIC (Bilateral, 2016); LYSIS-ADHESIONS (N/A, 2016); and ESOPHAGOGASTRODUODENOSCOPY (EGD) (N/A, 2014).    Family History: family history includes Alcohol abuse in her father; Bipolar disorder in her mother; Breast cancer in her paternal aunt; Cancer in her maternal aunt, maternal grandmother, and mother; Colon cancer in her maternal grandmother; Drug abuse in her brother and mother; Kidney disease in her father; Obesity in her maternal grandmother; Ovarian cancer in her mother; Stroke in  her maternal grandmother.. Otherwise no colon cancer, inflammatory bowel disease, or GI malignancies.    Social History:  reports that she quit smoking about 6 years ago. Her smoking use included cigarettes. She started smoking about 21 years ago. She has a 3.00 pack-year smoking history. she has never used smokeless tobacco. She reports that she drinks about 0.5 oz of alcohol per week. She reports that she does not use drugs.    Review of patient's allergies indicates:  No Known Allergies    Medications:   Medications Prior to Admission   Medication Sig Dispense Refill Last Dose    ALPRAZolam (XANAX) 0.5 MG tablet Take 1 tablet (0.5 mg total) by mouth 2 (two) times daily as needed for anxiety. 60 tablet 2 8/14/2018 at Unknown time    amitriptyline (ELAVIL) 75 MG tablet Take 1 tablet (75 mg total) by mouth every evening. 90 tablet 0 8/14/2018 at Unknown time    buPROPion (WELLBUTRIN) 100 MG tablet TAKE 1 TABLET THREE TIMES A DAY 90 tablet 2 8/15/2018 at Unknown time    dicyclomine (BENTYL) 20 mg tablet TAKE 1 TABLET FOUR TIMES A DAY BEFORE MEALS AND NIGHTLY 360 tablet 0 Past Week at Unknown time    ergocalciferol (ERGOCALCIFEROL) 50,000 unit Cap Take 1 capsule (50,000 Units total) by mouth twice a week. 24 capsule 0 Past Week at q    eszopiclone 3 mg Tab Take 1 tablet (3 mg total) by mouth nightly as needed. 90 tablet 1 Past Month at Unknown time    fluconazole (DIFLUCAN) 200 MG Tab Take 200 mg by mouth once.    Past Month at Unknown time    hydrOXYzine pamoate (VISTARIL) 50 MG Cap Take 1 capsule (50 mg total) by mouth 2 (two) times daily as needed (ANXIETY OR INSOMNIA. MAY TAKE UP  MG AT A TIME IF NEEDED.). 180 capsule 1 Past Month at Unknown time    ibuprofen (ADVIL,MOTRIN) 600 MG tablet Take 1 tablet (600 mg total) by mouth every 6 (six) hours as needed for Pain. 20 tablet 0 8/14/2018 at Unknown time    lubiprostone (AMITIZA) 8 MCG Cap Take 1 capsule (8 mcg total) by mouth 2 (two) times daily with  meals. 180 capsule 1 Past Week at Unknown time    topiramate (TOPAMAX) 50 MG tablet Take 1 tablet (50 mg total) by mouth 2 (two) times daily. 180 tablet 0 8/15/2018 at Unknown time       Physical Exam:    Vital Signs: There were no vitals filed for this visit.    General Appearance: Well appearing in no acute distress  Eyes:    No scleral icterus  ENT: Neck supple, Lips, mucosa, and tongue normal; teeth and gums normal  Lungs: CTA anteriorly  Heart:  Regular rate, S1, S2 normal, no murmurs heard.  Abdomen: Soft, non tender, non distended with normal bowel sounds. No hepatosplenomegaly, ascites, or mass.  Extremities: No edema  Skin: No rash    Labs:  Lab Results   Component Value Date    WBC 6.36 07/23/2018    HGB 12.7 07/23/2018    HCT 40.6 07/23/2018     07/23/2018    CHOL 152 07/23/2018    TRIG 102 07/23/2018    HDL 44 07/23/2018    ALT 28 07/23/2018    AST 18 07/23/2018     07/23/2018    K 3.9 07/23/2018     07/23/2018    CREATININE 0.7 07/23/2018    BUN 8 07/23/2018    CO2 23 07/23/2018    TSH 1.581 07/23/2018    HGBA1C 5.1 07/23/2018       I have explained the risks and benefits of endoscopy procedures to the patient including but not limited to bleeding, perforation, infection, and death.      Pan Castillo MD

## 2018-08-15 NOTE — ANESTHESIA POSTPROCEDURE EVALUATION
Anesthesia Post Evaluation    Patient: Anat Parks    Procedure(s) Performed: Procedure(s) (LRB):  ESOPHAGOGASTRODUODENOSCOPY (EGD) - 38641 (N/A)    Final Anesthesia Type: general  Patient location during evaluation: GI PACU  Patient participation: Yes- Able to Participate  Level of consciousness: awake and alert  Post-procedure vital signs: reviewed and stable  Pain management: adequate  Airway patency: patent  PONV status at discharge: No PONV  Anesthetic complications: no      Cardiovascular status: hemodynamically stable and blood pressure returned to baseline  Respiratory status: unassisted and spontaneous ventilation  Hydration status: euvolemic  Follow-up not needed.        Visit Vitals  /78 (BP Location: Right arm, Patient Position: Lying)   Pulse 70   Temp 37.1 °C (98.7 °F) (Temporal)   Resp 16   LMP 11/16/2016 (Exact Date)   SpO2 99%   Breastfeeding? No       Pain/Aishwarya Score: Pain Assessment Performed: Yes (8/15/2018  1:05 PM)  Presence of Pain: denies (8/15/2018  1:50 PM)  Aishwarya Score: 10 (8/15/2018  1:50 PM)

## 2018-08-15 NOTE — TRANSFER OF CARE
Anesthesia Transfer of Care Note    Patient: Anat Parks    Procedure(s) Performed: Procedure(s) (LRB):  ESOPHAGOGASTRODUODENOSCOPY (EGD) - 81159 (N/A)    Patient location: PACU    Anesthesia Type: general    Transport from OR: Transported from OR on room air with adequate spontaneous ventilation    Post pain: adequate analgesia    Post assessment: no apparent anesthetic complications    Post vital signs: stable    Level of consciousness: awake    Nausea/Vomiting: no nausea/vomiting    Complications: none    Transfer of care protocol was followed      Last vitals:   Visit Vitals  LMP 11/16/2016 (Exact Date)   Breastfeeding? No

## 2018-08-15 NOTE — ANESTHESIA PREPROCEDURE EVALUATION
08/15/2018  Anat Parks is a 38 y.o., female.  Past Medical History:   Diagnosis Date    Abdominal pain, other specified site     Right lower abdomen    Acid reflux     Anxiety     Bilateral ovarian cysts     Mostly on right, but left also    Bulging lumbar disc     DDD (degenerative disc disease), lumbar     Degenerative disc disease 2017    Lower back    Depression     Fatigue     Fatty liver     Fibroid tumor     Right ovary-sm    Heartburn     IBS (irritable bowel syndrome)     Menorrhagia     Ulcer     Had an upper gi take Prilosec     Past Surgical History:   Procedure Laterality Date     SECTION  3/1997; 2000    CHOLECYSTECTOMY  2009    COLONOSCOPY      ESOPHAGOGASTRODUODENOSCOPY      ESOPHAGOGASTRODUODENOSCOPY      gall bladder  2011    HYSTERECTOMY  2016    [facility] BLEEDING     TUBAL LIGATION  2000     Patient Active Problem List   Diagnosis    Dyspepsia    CHRISTINE (generalized anxiety disorder)    Panic disorder    MDD (major depressive disorder), recurrent episode    IBS (irritable bowel syndrome)    Psychophysiological insomnia    Obesity (BMI 30.0-34.9)    Family history of colon cancer    Family history of cervical cancer    Hiatal hernia    DDD (degenerative disc disease), lumbar    Fatty liver    Right arm pain    Sleep arousal disorder    Chronic back pain    GERD (gastroesophageal reflux disease)     There is no height or weight on file to calculate BMI.  2D Echo:  Results for orders placed or performed during the hospital encounter of 18   2D Echo w/ Color Flow Doppler   Result Value Ref Range    EF 58 55 - 65    Diastolic Dysfunction No     Aortic Valve Regurgitation TRIVIAL        Please See ROS/PMH and Active Problem List above      Anesthesia Evaluation    I have reviewed the Patient Summary  Reports.    I have reviewed the Nursing Notes.   I have reviewed the Medications.     Review of Systems  Anesthesia Hx:  No problems with previous Anesthesia    Hepatic/GI:   Hiatal Hernia, GERD Liver Disease,    Musculoskeletal:   Arthritis     Neurological:   Neuromuscular Disease,    Psych:   Psychiatric History          Physical Exam  General:  Well nourished, Obesity    Airway/Jaw/Neck:  Airway Findings: Mouth Opening: Normal Tongue: Normal  General Airway Assessment: Adult  Mallampati: II  TM Distance: Normal, at least 6 cm      Dental:  Dental Findings: ctemporary crown in left central incisor.             Anesthesia Plan  Type of Anesthesia, risks & benefits discussed:  Anesthesia Type:  general  Patient's Preference:   Intra-op Monitoring Plan: standard ASA monitors  Intra-op Monitoring Plan Comments:   Post Op Pain Control Plan:   Post Op Pain Control Plan Comments:   Induction:    Beta Blocker:  Patient is not currently on a Beta-Blocker (No further documentation required).       Informed Consent: Patient understands risks and agrees with Anesthesia plan.  Questions answered.   ASA Score: 2     Day of Surgery Review of History & Physical: I have interviewed and examined the patient. I have reviewed the patient's H&P dated:            Ready For Surgery From Anesthesia Perspective.

## 2018-08-15 NOTE — PLAN OF CARE
Discharge instructions given including diet, s/s to notify MD or return to ED and follow up. Pt verbalized understanding. Pt refused wheelchair. Ambulated off unit with family. Steady gait. No distress noted. No c/o pain. No c/o nausea.

## 2018-08-15 NOTE — [employer] PATIENT INSTRUCTIONS
Discharge Summary/Instructions after an Endoscopic Procedure  Patient Name: Anat Parks  Patient MRN: [id number]  Patient YOB: 1980  Wednesday, August 15, 2018  Dalton Chirinos MD  RESTRICTIONS:  During your procedure today, you received medications for sedation.  These   medications may affect your judgment, balance and coordination.  Therefore,   for 24 hours, you have the following restrictions:   - DO NOT drive a car, operate machinery, make legal/financial decisions,   sign important papers or drink alcohol.    ACTIVITY:  Today: no heavy lifting, straining or running due to procedural   sedation/anesthesia.  The following day: return to full activity including work.  DIET:  Eat and drink normally unless instructed otherwise.     TREATMENT FOR COMMON SIDE EFFECTS:  - Mild abdominal pain, nausea, belching, bloating or excessive gas:  rest,   eat lightly and use a heating pad.  - Sore Throat: treat with throat lozenges and/or gargle with warm salt   water.  - Because air was used during the procedure, expelling large amounts of air   from your rectum or belching is normal.  - If a bowel prep was taken, you may not have a bowel movement for 1-3 days.    This is normal.  SYMPTOMS TO WATCH FOR AND REPORT TO YOUR PHYSICIAN:  1. Abdominal pain or bloating, other than gas cramps.  2. Chest pain.  3. Back pain.  4. Signs of infection such as: chills or fever occurring within 24 hours   after the procedure.  5. Rectal bleeding, which would show as bright red, maroon, or black stools.   (A tablespoon of blood from the rectum is not serious, especially if   hemorrhoids are present.)  6. Vomiting.  7. Weakness or dizziness.  GO DIRECTLY TO THE NEAREST EMERGENCY ROOM IF YOU HAVE ANY OF THE FOLLOWING:      Difficulty breathing              Chills and/or fever over 101 F   Persistent vomiting and/or vomiting blood   Severe abdominal pain   Severe chest pain   Black, tarry stools   Bleeding- more than one  tablespoon   Any other symptom or condition that you feel may need urgent attention  Your doctor recommends these additional instructions:  If any biopsies were taken, your doctors clinic will contact you in 1 to 2   weeks with any results.  - Discharge patient to home.   - Patient has a contact number available for emergencies.  The signs and   symptoms of potential delayed complications were discussed with the   patient.  Return to normal activities tomorrow.  Written discharge   instructions were provided to the patient.   - Resume previous diet.   - Continue present medications.   - Await pathology results.   - Telephone GI clinic for pathology results in 1 week.   - Return to referring physician.  For questions, problems or results please call your physician - Dalton Chirinos MD at Work:  [phone].  [facility], EMERGENCY ROOM PHONE NUMBER: [phone]  IF A COMPLICATION OR EMERGENCY SITUATION ARISES AND YOU ARE UNABLE TO REACH   YOUR PHYSICIAN - GO DIRECTLY TO THE EMERGENCY ROOM.  Dalton Chirinos MD  8/15/2018 1:33:42 PM  This report has been verified and signed electronically.  [employer]

## 2018-08-15 NOTE — DISCHARGE INSTRUCTIONS

## 2018-08-16 ENCOUNTER — TELEPHONE (OUTPATIENT)
Dept: BARIATRICS | Facility: CLINIC | Age: 38
End: 2018-08-16

## 2018-08-16 ENCOUNTER — PATIENT MESSAGE (OUTPATIENT)
Dept: BARIATRICS | Facility: CLINIC | Age: 38
End: 2018-08-16

## 2018-08-16 RX ORDER — OMEPRAZOLE 40 MG/1
40 CAPSULE, DELAYED RELEASE ORAL
Qty: 60 CAPSULE | Refills: 2 | Status: ON HOLD | OUTPATIENT
Start: 2018-08-16 | End: 2018-11-07 | Stop reason: SDUPTHER

## 2018-08-16 RX ORDER — OMEPRAZOLE 40 MG/1
40 CAPSULE, DELAYED RELEASE ORAL
Qty: 60 CAPSULE | Refills: 0 | Status: SHIPPED | OUTPATIENT
Start: 2018-08-16 | End: 2018-08-16 | Stop reason: SDUPTHER

## 2018-08-16 NOTE — TELEPHONE ENCOUNTER
----- Message from Suzanne Steven RN sent at 8/13/2018  6:33 PM CDT -----  Patient has decided on LRNY instead of the sleeve.  Can you review chart and see which procedure would be best for the patient.  Once decision is made -Yessica,  please call patient to discuss.      Thanks!      Suzanne

## 2018-08-16 NOTE — TELEPHONE ENCOUNTER
Spoke with pt. Reviewed sleeve vs bypass. She wants bypass after doing more research.   She has re-viewed seminar.  Reviewed EGD results: gastritis, duodenitis.   No NSAIDS. Talk with prescribing provider.  Not taking any PPI. Start PPI twice daily, for 12 weeks, then daily.

## 2018-08-17 ENCOUNTER — TELEPHONE (OUTPATIENT)
Dept: BARIATRICS | Facility: CLINIC | Age: 38
End: 2018-08-17

## 2018-08-17 NOTE — TELEPHONE ENCOUNTER
----- Message from Jennifer Mcfarland sent at 8/17/2018 11:18 AM CDT -----  [phone]//pt states that she needs to speak with someone in ref to making a correction on her rx/the rx needs to say 40mg at day and at night instead of 80mg//please contact [phone] //please call/thank you

## 2018-08-20 ENCOUNTER — CLINICAL SUPPORT (OUTPATIENT)
Dept: BARIATRICS | Facility: CLINIC | Age: 38
End: 2018-08-20
Payer: OTHER GOVERNMENT

## 2018-08-20 ENCOUNTER — TELEPHONE (OUTPATIENT)
Dept: BARIATRICS | Facility: CLINIC | Age: 38
End: 2018-08-20

## 2018-08-20 ENCOUNTER — PATIENT MESSAGE (OUTPATIENT)
Dept: INTERNAL MEDICINE | Facility: CLINIC | Age: 38
End: 2018-08-20

## 2018-08-20 VITALS — HEIGHT: 68 IN | WEIGHT: 228.81 LBS | BODY MASS INDEX: 34.68 KG/M2

## 2018-08-20 DIAGNOSIS — E66.9 OBESITY (BMI 30.0-34.9): ICD-10-CM

## 2018-08-20 DIAGNOSIS — Z71.3 DIETARY COUNSELING: ICD-10-CM

## 2018-08-20 DIAGNOSIS — G47.8 SLEEP AROUSAL DISORDER: ICD-10-CM

## 2018-08-20 PROCEDURE — 99499 UNLISTED E&M SERVICE: CPT | Mod: S$PBB,,, | Performed by: DIETITIAN, REGISTERED

## 2018-08-20 PROCEDURE — 99212 OFFICE O/P EST SF 10 MIN: CPT | Mod: PBBFAC | Performed by: DIETITIAN, REGISTERED

## 2018-08-20 PROCEDURE — 99999 PR PBB SHADOW E&M-EST. PATIENT-LVL II: CPT | Mod: PBBFAC,,, | Performed by: DIETITIAN, REGISTERED

## 2018-08-20 PROCEDURE — 97803 MED NUTRITION INDIV SUBSEQ: CPT | Mod: PBBFAC,59 | Performed by: DIETITIAN, REGISTERED

## 2018-08-20 RX ORDER — BUPROPION HYDROCHLORIDE 100 MG/1
TABLET ORAL
Qty: 90 TABLET | Refills: 2 | Status: SHIPPED | OUTPATIENT
Start: 2018-08-20 | End: 2018-10-08 | Stop reason: SDUPTHER

## 2018-08-20 NOTE — TELEPHONE ENCOUNTER
----- Message from Leona Franco sent at 8/20/2018 11:02 AM CDT -----  Pt is calling for Yessica Mata in regards to 40 mg  Twice a day Prilos, and Pharmacy needs clarification,  If this what he's suppose to dispense , Pharmacy, and its express script on a  installation, please call Pt at [phone]

## 2018-08-20 NOTE — PROGRESS NOTES
NUTRITION NOTE    Referring Physician: Kale Renee M.D.  Reason for MNT Referral: Medically Supervised Diet pending Gastric Bypass    PAST MEDICAL HISTORY:    Patient presents for 2nd visit for MSD with 4 lbs weight loss over the past month; total weight loss by making numerous dietary and lifestyle changes.    Past Medical History:   Diagnosis Date    Abdominal pain, other specified site     Right lower abdomen    Acid reflux     Anxiety     Bilateral ovarian cysts     Mostly on right, but left also    Bulging lumbar disc     DDD (degenerative disc disease), lumbar     Degenerative disc disease Nov 2017    Lower back    Depression     Fatigue     Fatty liver     Fibroid tumor     Right ovary-sm    Heartburn     IBS (irritable bowel syndrome)     Menorrhagia     Ulcer 2014    Had an upper gi take Prilosec       CLINICAL DATA:  38 y.o. female.    Current Weight: 228 lbs  Weight Change Since Initial Visit: -4 lbs  Ideal Body Weight: 157 lbs  BMI: 34.8    CURRENT DIET:  Reduced-calorie diet.  Diet Recall: Food records are present.    Breakfast: eggs, sandoval or yogurt or 1/2 shake  Snack: fruit cup or banana or none  Lunch:veggie noodles with chicken or 2 hot dogs or cottage cheese with peaches   Snack: pickles and olives or banana/ yogurt  Dinner: pot roast with peas and carrots or salad with chicken or gumbo with No rice  Snack: none or sf ice cream      Diet Includes: 3 meals and 1-3 snacks  Meal Pattern: Improved.  Protein Supplements: 1/2 per day.  Snacking: Inadequate. Snacks include healthy choices.    Vegetables: Likes a variety. Eats daily.  Fruits: Likes a variety. Eats daily.  Beverages: water, sugar-free beverages and coffee without sugar  Dining Out: Monthly. Mostly restaurants.  Cooking at home: Daily. Mostly baked and grilled meat, fish and vegetables.    CURRENT EXERCISE:  Adequate - avgerage 89622 steps per day     Vitamins / Minerals / Herbs:   MV (chewable) x 2/day  Vit D    Food  Allergies:   none    Social:  Works regular daytime shifts.  Alcohol: Socially.  Smoking: quit 6 years ago    ASSESSMENT:  Patient demonstrates some willingness to change lifestyle habits as evidenced by weight loss, good exercise, daily food logs, dietary changes, including protein drinks, increased fruits, increased vegetables, reduced dining out, healthier cooking at home, healthier snacking at work and healthier snacking at home.    Doing fairly well with working on greatest challenges (sweets).    Adequate calorie intake.  Inadequate protein intake.    PLAN:    Diet: Adjust diet plan.  5-6 small meals/day  1 protein drink per day  Protein with all snacks and meals  Continue to shop for vitamins    Exercise: Maintain.    Behavior Modification: Continue to document food & activity logs daily.    Weight loss prior to surgery (5-10% TBW): 16-33 lbs    Return to clinic in one month.    SESSION TIME:  45 minutes

## 2018-08-20 NOTE — PATIENT INSTRUCTIONS
Goals:  5-6 small meals/day  1 protein drink per day  Protein with all snacks and meals  Continue to shop for vitamins

## 2018-08-21 RX ORDER — TRAMADOL HYDROCHLORIDE 50 MG/1
50 TABLET ORAL EVERY 12 HOURS PRN
Qty: 30 TABLET | Refills: 0 | Status: SHIPPED | OUTPATIENT
Start: 2018-08-21 | End: 2018-09-05

## 2018-08-22 ENCOUNTER — TELEPHONE (OUTPATIENT)
Dept: ENDOSCOPY | Facility: HOSPITAL | Age: 38
End: 2018-08-22

## 2018-08-24 ENCOUNTER — TELEPHONE (OUTPATIENT)
Dept: BARIATRICS | Facility: CLINIC | Age: 38
End: 2018-08-24

## 2018-08-24 NOTE — TELEPHONE ENCOUNTER
----- Message from Kale Renee Jr., MD sent at 8/20/2018  1:00 PM CDT -----  I think either is fine.  The bypass will help with there reflux though.  If that's a major concern then it could be helpful.      ----- Message -----  From: Suzanne Steven RN  Sent: 8/13/2018   6:33 PM  To: LINDA Guaman, #    Patient has decided on LRNY instead of the sleeve.  Can you review chart and see which procedure would be best for the patient.  Once decision is made -Yessica,  please call patient to discuss.      Thanks!      Suzanne

## 2018-08-28 ENCOUNTER — DOCUMENTATION ONLY (OUTPATIENT)
Dept: BARIATRICS | Facility: CLINIC | Age: 38
End: 2018-08-28

## 2018-08-28 NOTE — PROGRESS NOTES
EGD biopsy  Per Dr. Chirinos- looks like she has Giardia in her small bowel, requires treatment per GI.

## 2018-08-29 ENCOUNTER — PATIENT MESSAGE (OUTPATIENT)
Dept: GASTROENTEROLOGY | Facility: HOSPITAL | Age: 38
End: 2018-08-29

## 2018-08-29 DIAGNOSIS — A07.1 INTESTINAL GIARDIASIS: Primary | ICD-10-CM

## 2018-08-29 DIAGNOSIS — R11.0 NAUSEA: ICD-10-CM

## 2018-08-29 RX ORDER — ONDANSETRON 4 MG/1
4 TABLET, FILM COATED ORAL EVERY 8 HOURS PRN
Qty: 15 TABLET | Refills: 1 | Status: SHIPPED | OUTPATIENT
Start: 2018-08-29 | End: 2018-09-03

## 2018-08-29 RX ORDER — METRONIDAZOLE 500 MG/1
500 TABLET ORAL EVERY 12 HOURS
Qty: 10 TABLET | Refills: 0 | Status: SHIPPED | OUTPATIENT
Start: 2018-08-29 | End: 2018-09-03

## 2018-09-04 ENCOUNTER — TELEPHONE (OUTPATIENT)
Dept: BARIATRICS | Facility: CLINIC | Age: 38
End: 2018-09-04

## 2018-09-05 ENCOUNTER — TELEPHONE (OUTPATIENT)
Dept: BARIATRICS | Facility: CLINIC | Age: 38
End: 2018-09-05

## 2018-09-05 NOTE — TELEPHONE ENCOUNTER
----- Message from Dante Miles sent at 9/4/2018 11:01 AM CDT -----  Pt needs to reschedule her appt. Pt said she usually see you and is requesting appt with you. Please call pt at [phone]

## 2018-09-05 NOTE — TELEPHONE ENCOUNTER
Left message with contact information for pt to call back regarding rescheduling appointment.      Addendum: Rescheduled appointment (9/17).

## 2018-09-17 ENCOUNTER — OFFICE VISIT (OUTPATIENT)
Dept: PSYCHIATRY | Facility: CLINIC | Age: 38
End: 2018-09-17
Payer: OTHER GOVERNMENT

## 2018-09-17 ENCOUNTER — DOCUMENTATION ONLY (OUTPATIENT)
Dept: BARIATRICS | Facility: CLINIC | Age: 38
End: 2018-09-17

## 2018-09-17 ENCOUNTER — TELEPHONE (OUTPATIENT)
Dept: BARIATRICS | Facility: CLINIC | Age: 38
End: 2018-09-17

## 2018-09-17 DIAGNOSIS — K21.9 GASTROESOPHAGEAL REFLUX DISEASE, ESOPHAGITIS PRESENCE NOT SPECIFIED: ICD-10-CM

## 2018-09-17 DIAGNOSIS — F33.42 MAJOR DEPRESSIVE DISORDER, RECURRENT, IN FULL REMISSION: ICD-10-CM

## 2018-09-17 DIAGNOSIS — E66.01 MORBID OBESITY DUE TO EXCESS CALORIES: ICD-10-CM

## 2018-09-17 DIAGNOSIS — M54.9 CHRONIC BACK PAIN, UNSPECIFIED BACK LOCATION, UNSPECIFIED BACK PAIN LATERALITY: ICD-10-CM

## 2018-09-17 DIAGNOSIS — G89.29 CHRONIC BACK PAIN, UNSPECIFIED BACK LOCATION, UNSPECIFIED BACK PAIN LATERALITY: ICD-10-CM

## 2018-09-17 DIAGNOSIS — Z01.818 PREOP EXAMINATION: Primary | ICD-10-CM

## 2018-09-17 DIAGNOSIS — G47.8 SLEEP AROUSAL DISORDER: ICD-10-CM

## 2018-09-17 DIAGNOSIS — K76.0 FATTY LIVER: ICD-10-CM

## 2018-09-17 DIAGNOSIS — F41.0 PANIC ATTACKS: ICD-10-CM

## 2018-09-17 DIAGNOSIS — F41.1 GAD (GENERALIZED ANXIETY DISORDER): ICD-10-CM

## 2018-09-17 DIAGNOSIS — M51.36 DDD (DEGENERATIVE DISC DISEASE), LUMBAR: ICD-10-CM

## 2018-09-17 PROCEDURE — 90791 PSYCH DIAGNOSTIC EVALUATION: CPT | Mod: PBBFAC | Performed by: PSYCHOLOGIST

## 2018-09-17 PROCEDURE — 99211 OFF/OP EST MAY X REQ PHY/QHP: CPT | Mod: PBBFAC | Performed by: PSYCHOLOGIST

## 2018-09-17 PROCEDURE — 96102 PR PSYCHOLOGIC TESTING BY TECHNICIAN: CPT | Mod: 59,S$PBB,, | Performed by: PSYCHOLOGIST

## 2018-09-17 PROCEDURE — 90791 PSYCH DIAGNOSTIC EVALUATION: CPT | Mod: S$PBB,,, | Performed by: PSYCHOLOGIST

## 2018-09-17 PROCEDURE — 96101 PR PSYCHOLOGIC TESTING BY PSYCH/PHYS: CPT | Mod: PBBFAC,59 | Performed by: PSYCHOLOGIST

## 2018-09-17 PROCEDURE — 96102 PR PSYCHOLOGIC TESTING BY TECHNICIAN: CPT | Mod: PBBFAC | Performed by: PSYCHOLOGIST

## 2018-09-17 PROCEDURE — 99999 PR PBB SHADOW E&M-EST. PATIENT-LVL I: CPT | Mod: PBBFAC,,, | Performed by: PSYCHOLOGIST

## 2018-09-17 PROCEDURE — 96101 PR PSYCHOLOGIC TESTING BY PSYCH/PHYS: CPT | Mod: S$PBB,,, | Performed by: PSYCHOLOGIST

## 2018-09-17 NOTE — Clinical Note
This patient will be psychologically cleared to proceed with bariatric surgery with the fulfillment of conditions:  She will crush psychotropic medications (Wellbutrin, Xanax, and Elavil) for 3-6 months with Dr. Vaz's approval (he has already written approval but I informed her he would need to do so by the end of the established timeline).  She reports crushing these meds starting in July 2018.  She will be required to obtain nutritional clearance.  She does acknowledge anxiety and panic attacks that are well-managed with adaptive coping strategies.  Her test results did not indicate significant psychiatric problems or distress.  She does have some anxiety about not being approved for surgery, but was able to identify adequate cope ahead skills.  Psychological intervention does not appear necessary at this time, but please contact me if anything is needed in the future.  JR Chiki

## 2018-09-17 NOTE — TELEPHONE ENCOUNTER
Dietitian, Berna Robertson, called in due to illness.  Called patient and rescheduled appointments

## 2018-09-17 NOTE — PSYCH TESTING
[facility] [address] [place]  [address]  [phone]    REPORT OF PSYCHOLOGICAL TESTING    NAME: Anat Parks  OC #: [id number]  : 1980    REFERRED BY: Kale Renee Jr., M.D.    EVALUATED BY:  Nakita Lara, Ph.D., Clinical Psychologist  MAYKEL Pratt Psychometrician    DATES OF EVALUATION: 2018, 2018    EVALUATION PROCEDURES AND TIMES:  Conducted by Psychologist:  Interpretation and report of test data  Integration of information from diagnostic interview, medical record, and testing data  Conducted by Technician:  [place] Multiphasic Personality Inventory - 2 - Restructured Form (MMPI-2-RF)  [facility] Behavioral Medicine Diagnostic (MBMD)  CPT Codes and Time:  68892 - 2 hours; 27246 - 3 hours    EVALUATION FINDINGS:  Before this evaluation was initiated, the purposes and process of the assessment and the limits of confidentiality were discussed with the patient who expressed understanding of these issues and orally consented to proceed with the evaluation.    Ms. Anat Parks is a 38-year-old White  female who is pursuing bariatric surgery to improve her health and quality of life.  She has a history of depression, anxiety, and insomnia that is well-managed by adaptive coping strategies and psychotropic medication (Xanax, Elavil, Wellbutrin) prescribed by her psychiatrist Yuriy Vaz MD ([facility]).  Based on her reports, her symptoms currently meet criteria for Generalized Anxiety Disorder (in partial remission), Panic Attacks, and Major Depressive Disorder, recurrent, in full remission.  She denied any history of suicidality or non-suicidal self-injury.  She does not report eating disorder symptoms.      Ms. Parks has struggled with weight since she was 11 years old.  At that time, her parents  and I think I was emotionally eating because my dad moved out - and I was learning to cook and  bake.  Factors that have contributed to her weight gain over the years include:  lack of activity or exercise, eating unhealthy foods (pasta, fried chicken, Ramen noodles, chicken and dumplings, ice cream), cereal, and dining out.  In addition, Ms. Parks acknowledges that she is an emotional eater, with stress as her primary emotional trigger to overeat.  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  She is working on increasing her coping mechanisms for stress, including playing with her dogs outside and staying busy.  She has tried many weight loss methods on her own (i.e., Paleo, low carb, diet doctor in [facility], diet pills, exercise) with some success (25 lb. weight loss in six months in 2011), and believes that her biggest weight loss challenge is stress - just worrying about why I couldnt get it off.  Her motivation for seeking surgery now is to improve pain and health.  Her postsurgical goals include reducing pain, reducing arm flabbiness, improving shortness of breath, going on long walks, and improving self-image.  She was emotional when talking about her motivation for surgery because others tell her she does not need the surgery because of her success thus far.    At her initial consultation with LINDA Guaman, on 05/09/2018, her BMI was 36.67. Her medical comorbidities include: Chronic back pain, unspecified back location, unspecified back pain laterality; Gastroesophageal reflux disease, esophagitis presence not specified; DDD (degenerative disc disease), lumbar; Fatty liver.  She completed a nutritional consultation with Berna Robertson RD, bariatric nutritionist, and reports that she has made many changes to her diet since beginning the program.  She has a good understanding regarding the risks and benefits of the procedure and appears motivated for change.  She was fully cooperative and engaged in the assessment process.      Ms. Parks produced a valid and interpretable MMPI-2-RF profile, answering items relevantly based on content. Therefore, her responses should be considered a relatively accurate reflection of her current psychological functioning.  Her scores are all within a stable range and indicate no current psychological symptoms or dysfunction.     The MBMD indicated that Ms. Parks is not reporting any significant psychiatric distress at this time.  She will probably try to meet the expectations of healthcare personnel, but may do so in a passive or self-demeaning manner.  She may be sensitive to the opinions and attitudes of others, and may be disposed to complain or find fault in others.  Based on her responses, she may be at risk for overinterpreting symptoms, which can lead to overusing healthcare services.  If she experiences any impairment in treatment adherence due to these characteristics or lack of health literacy, she should be encouraged to use family support to help her.  Overall Ms. Parks reports remarkably few difficulties maintaining her daily routines in the face of illness, and test data suggests there is little reason to believe psychological characteristics will lead to complications in recovery.      DIAGNOSTIC IMPRESSIONS:    ICD-10-CM ICD-9-CM   1. Preop examination Z01.818 V72.84   2. Morbid obesity due to excess calories E66.01 278.01   3. Gastroesophageal reflux disease, esophagitis presence not specified K21.9 530.81   4. Chronic back pain, unspecified back location, unspecified back pain laterality M54.9 724.5    G89.29 338.29   5. DDD (degenerative disc disease), lumbar M51.36 722.52   6. Fatty liver K76.0 571.8   7. Sleep arousal disorder G47.8 307.46   8. BMI 36.0-36.9,adult Z68.36 V85.36   9. CHRISTINE (generalized anxiety disorder) F41.1 300.02   10. Panic attacks F41.0 300.01   11. Major depressive disorder, recurrent, in full remission F33.42 296.36       SUMMARY AND  RECOMMENDATIONS:  Ms. Parks has a long history of weight problems and is pursuing bariatric surgery at this time in an effort to improve her health and quality of life.  Test results should be considered valid, and indicate that she reports no current psychiatric symptoms or adjustment problems.  In the clinical interview, she acknowledged a history of depression, anxiety, panic attacks, and insomnia that is well-managed with psychotropic medications and adaptive coping strategies.  She reports good social support, demonstrates several characteristics that make her a good candidate for surgery, and testing suggests that she will benefit in multiple ways from bariatric surgery.  Ms. Parks will be considered a suitable candidate for bariatric surgery after crushing psychotropic medications for 3-6 months and obtaining nutritional clearance.

## 2018-09-24 DIAGNOSIS — K58.2 IRRITABLE BOWEL SYNDROME WITH BOTH CONSTIPATION AND DIARRHEA: ICD-10-CM

## 2018-09-25 RX ORDER — DICYCLOMINE HYDROCHLORIDE 20 MG/1
TABLET ORAL
Qty: 360 TABLET | Refills: 0 | OUTPATIENT
Start: 2018-09-25

## 2018-09-25 RX ORDER — AMITRIPTYLINE HYDROCHLORIDE 75 MG/1
TABLET ORAL
Qty: 90 TABLET | Refills: 0 | Status: SHIPPED | OUTPATIENT
Start: 2018-09-25 | End: 2018-10-08 | Stop reason: SDUPTHER

## 2018-09-26 ENCOUNTER — CLINICAL SUPPORT (OUTPATIENT)
Dept: BARIATRICS | Facility: CLINIC | Age: 38
End: 2018-09-26
Payer: OTHER GOVERNMENT

## 2018-09-26 VITALS — WEIGHT: 222.69 LBS | HEIGHT: 68 IN | BODY MASS INDEX: 33.75 KG/M2

## 2018-09-26 DIAGNOSIS — G47.8 SLEEP AROUSAL DISORDER: ICD-10-CM

## 2018-09-26 DIAGNOSIS — Z71.3 DIETARY COUNSELING: ICD-10-CM

## 2018-09-26 DIAGNOSIS — E66.9 OBESITY (BMI 30.0-34.9): ICD-10-CM

## 2018-09-26 PROCEDURE — 97803 MED NUTRITION INDIV SUBSEQ: CPT | Mod: PBBFAC,59 | Performed by: DIETITIAN, REGISTERED

## 2018-09-26 PROCEDURE — 99211 OFF/OP EST MAY X REQ PHY/QHP: CPT | Mod: PBBFAC | Performed by: DIETITIAN, REGISTERED

## 2018-09-26 PROCEDURE — 99499 UNLISTED E&M SERVICE: CPT | Mod: S$PBB,,, | Performed by: DIETITIAN, REGISTERED

## 2018-09-26 PROCEDURE — 99999 PR PBB SHADOW E&M-EST. PATIENT-LVL I: CPT | Mod: PBBFAC,,, | Performed by: DIETITIAN, REGISTERED

## 2018-09-26 NOTE — PROGRESS NOTES
NUTRITION NOTE     Referring Physician: Kale Renee M.D.  Reason for MNT Referral: Medically Supervised Diet pending Gastric Bypass     Patient presents for 3rd visit for MSD with 6 lbs weight loss over the past month; total weight loss by making numerous dietary and lifestyle changes.          Past Medical History:   Diagnosis Date    Abdominal pain, other specified site       Right lower abdomen    Acid reflux      Anxiety      Bilateral ovarian cysts       Mostly on right, but left also    Bulging lumbar disc      DDD (degenerative disc disease), lumbar      Degenerative disc disease Nov 2017     Lower back    Depression      Fatigue      Fatty liver      Fibroid tumor       Right ovary-sm    Heartburn      IBS (irritable bowel syndrome)      Menorrhagia      Ulcer 2014     Had an upper gi take Prilosec         CLINICAL DATA:  38 y.o. female.     Current Weight: 222 lbs  Weight Change Since Initial Visit: -10 lbs  Ideal Body Weight: 157 lbs  BMI: 33.8     CURRENT DIET:  Reduced-calorie diet.  Diet Recall: Food records are present.     Snack: protein shake  Breakfast: eggs, sandoval or yogurt   Lunch: veggie noodles with chicken or 2 hot dogs or cottage cheese with peaches   Snack: protein shake  Dinner: pot roast with peas and carrots or salad with chicken or gumbo with No rice  Snack: other half of dinner        Diet Includes: 3 meals and 3 snacks  Meal Pattern: Improved.  Protein Supplements: 2 per day.  Snacking: Adequate. Snacks include healthy choices.     Vegetables: Likes a variety. Eats daily.  Fruits: Likes a variety. Eats daily.  Beverages: water, sugar-free beverages and coffee without sugar  Dining Out: Monthly. Mostly restaurants.  Cooking at home: Daily. Mostly baked and grilled meat, fish and vegetables.     CURRENT EXERCISE:  Adequate - average 66303-47805 steps per day      Vitamins / Minerals / Herbs:   MV (chewable) x 2/day  Vit D  Calcium citrate     Food Allergies:    none     Social:  Works regular daytime shifts.  Alcohol: Socially.  Smoking: quit 6 years ago     ASSESSMENT:  Patient demonstrates some willingness to change lifestyle habits as evidenced by weight loss, good exercise, daily food logs, dietary changes, including protein drinks, increased fruits, increased vegetables, reduced dining out, healthier cooking at home, healthier snacking at work and healthier snacking at home.     Doing well with working on greatest challenges (sweets).     Adequate calorie intake.  Adequate protein intake.     PLAN:  Pt is a good candidate for surgery    Diet: Continue diet plan.  5-6 small meals/day  1-2 protein drink per day  Protein with all snacks and meals  Checked vitamins - needs to change calcium citrate with VitD  Decrease caffeine in daily intakes.     Exercise: Maintain.     Behavior Modification: Continue to document food & activity logs daily.     Weight loss prior to surgery (5-10% TBW): 16-33 lbs       SESSION TIME:  30 minutes

## 2018-09-30 ENCOUNTER — PATIENT MESSAGE (OUTPATIENT)
Dept: BARIATRICS | Facility: CLINIC | Age: 38
End: 2018-09-30

## 2018-10-01 NOTE — TELEPHONE ENCOUNTER
Chart reviewed. Spoke with patient.  Confirmed LRNY- with Dr. Renee.  She stated that she has been crushing her psych meds x 3 months and will have Dr. Vaz send message regarding stability on crushed meds.  she stated she actually is feeling better with the new prescription.  Message sent Scott Hale to get insurance auth

## 2018-10-02 ENCOUNTER — TELEPHONE (OUTPATIENT)
Dept: PSYCHIATRY | Facility: CLINIC | Age: 38
End: 2018-10-02

## 2018-10-02 NOTE — TELEPHONE ENCOUNTER
Patient states she is needing a brief letter sent through [employer] to LINDA Lopez ([facility]).     The letter needs to state that she has been crushing all her medications for the past 3 months and patient is doing well on her current medications.

## 2018-10-03 NOTE — TELEPHONE ENCOUNTER
"Per Dr. Vaz: "She has been crushing all her medications for the past 3 months, and she has been doing psychiatrically well on her current medications."   "

## 2018-10-08 ENCOUNTER — PATIENT MESSAGE (OUTPATIENT)
Dept: BARIATRICS | Facility: CLINIC | Age: 38
End: 2018-10-08

## 2018-10-08 ENCOUNTER — OFFICE VISIT (OUTPATIENT)
Dept: PSYCHIATRY | Facility: CLINIC | Age: 38
End: 2018-10-08
Payer: OTHER GOVERNMENT

## 2018-10-08 VITALS
DIASTOLIC BLOOD PRESSURE: 66 MMHG | BODY MASS INDEX: 33.91 KG/M2 | WEIGHT: 223.75 LBS | HEART RATE: 68 BPM | SYSTOLIC BLOOD PRESSURE: 118 MMHG | HEIGHT: 68 IN | RESPIRATION RATE: 17 BRPM

## 2018-10-08 DIAGNOSIS — F41.0 PANIC DISORDER: ICD-10-CM

## 2018-10-08 DIAGNOSIS — F41.1 GAD (GENERALIZED ANXIETY DISORDER): Primary | ICD-10-CM

## 2018-10-08 DIAGNOSIS — F33.42 MAJOR DEPRESSIVE DISORDER, RECURRENT, IN FULL REMISSION: ICD-10-CM

## 2018-10-08 DIAGNOSIS — K58.2 IRRITABLE BOWEL SYNDROME WITH BOTH CONSTIPATION AND DIARRHEA: ICD-10-CM

## 2018-10-08 DIAGNOSIS — F51.04 PSYCHOPHYSIOLOGICAL INSOMNIA: ICD-10-CM

## 2018-10-08 PROCEDURE — 99213 OFFICE O/P EST LOW 20 MIN: CPT | Mod: PBBFAC | Performed by: PSYCHIATRY & NEUROLOGY

## 2018-10-08 PROCEDURE — 99214 OFFICE O/P EST MOD 30 MIN: CPT | Mod: S$PBB,,, | Performed by: PSYCHIATRY & NEUROLOGY

## 2018-10-08 PROCEDURE — 99999 PR PBB SHADOW E&M-EST. PATIENT-LVL III: CPT | Mod: PBBFAC,,, | Performed by: PSYCHIATRY & NEUROLOGY

## 2018-10-08 RX ORDER — ALPRAZOLAM 0.5 MG/1
0.5 TABLET ORAL 2 TIMES DAILY PRN
Qty: 60 TABLET | Refills: 2 | Status: ON HOLD | OUTPATIENT
Start: 2018-10-08 | End: 2018-11-09 | Stop reason: HOSPADM

## 2018-10-08 RX ORDER — TOPIRAMATE 50 MG/1
50 TABLET, FILM COATED ORAL 2 TIMES DAILY
Qty: 180 TABLET | Refills: 0 | Status: ON HOLD | OUTPATIENT
Start: 2018-10-08 | End: 2018-11-09 | Stop reason: HOSPADM

## 2018-10-08 RX ORDER — AMITRIPTYLINE HYDROCHLORIDE 75 MG/1
75 TABLET ORAL NIGHTLY
Qty: 90 TABLET | Refills: 0 | Status: SHIPPED | OUTPATIENT
Start: 2018-10-08 | End: 2019-01-07 | Stop reason: SDUPTHER

## 2018-10-08 RX ORDER — BUPROPION HYDROCHLORIDE 100 MG/1
100 TABLET ORAL 3 TIMES DAILY
Qty: 270 TABLET | Refills: 0 | Status: SHIPPED | OUTPATIENT
Start: 2018-10-08 | End: 2019-01-07 | Stop reason: SDUPTHER

## 2018-10-08 NOTE — PROGRESS NOTES
"Outpatient Psychiatry Follow-Up Visit (MD/NP)    10/8/2018    Clinical Status of Patient:  Outpatient (Ambulatory)    Chief Complaint:  Anat Parks is a 38 y.o. female who presents today for follow-up of depression and anxiety.  Met with patient and spouse.      Interval History and Content of Current Session:  Interim Events/Subjective Report/Content of Current Session:   The patient was seen and her chart reviewed. Reviewed notes from Vanda WALDEN from 9/26/18 andMarco PHd from 9/25/18    She has been compliant with treatment. She had no adverse reactions or side effects.  She tolerated the previous medication changes well without complications.     No new psychosocial stressors were presented today. Her marriage is going well. She continues to deal with family struggles (balancing work/home/life responsibilities). Her  previously had a successful tx with the spinal stimulator for his chronic pain (some recent adjustments were made)- he is doing better with his pain overall, but he has continued PTSD-related issues. 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 Her youngest son is attending [employer]. Her oldest left the [employer]- he is living at home and seeking new employment.      She had no new medical issues since she was last seen. She and her family are working on their diet and have been losing weight- she is in the process of trying to get approved for gastric by-pass surgery (it is proceeding).  She previously had had a sleep study- was diagnosed with mild ERICH and recommended to loose weight to correct it. .     "I've been good."     Improved/Controlled Symptoms of Depression: no diminished mood, no loss of interest/anhedonia; no irritability, no diminished energy, no change in sleep (see below; improved overall), no change in appetite, no diminished " concentration or cognition or indecisiveness, no PMA/R, no excessive guilt, denied hopelessness/worthlessness, no suicidal ideations    Controlled Sleep disturbance: no trouble with initiation (relieved with medications- may happen occasionally; may be getting some tolerance to the lunesta), no issues maintenance, no early morning awakening with inability to return to sleep; getting about 8-12 hours per night on average. She had not required  lunesta since the last appointment     No Suicidal/Homicidal ideations: no active/passive ideations, organized plans, or future intentions; no past attempts.     Denied Symptoms of CHRISTINE: no excessive anxiety/worry/fear, no restlessness, no fatigue, no poor concentration, no irritability, no muscle tension, no sleep disturbance     Mostly Controlled but persistent symptoms of Panic Disorder: she is now averaging about 1 panic attack per 2 weeks- remain triggered but somewhat difficult to manage. They resolve quickly with xanax.    No Symptoms of sexual disorders: no decrease in libido/desire (better); no issues and orgasmic (better); no pain    Overall, her IBS persists but is controlled; Back pain is better but persists.     Weight: she is working on dieting to lose weight. Her weight on 10/31/17 was 254 lbs; weight on 2/7/18 was 240 lbs; weight on 4/30 was 241 lbs and is 223 lbs on 10/8/18. She is pursuing gastric bypass surgery- they are trying to get it approved.      Previous medication trials include luvox (increased irritability).    PSYCHOTHERAPY ADD-ON +[id number]   30 (16-37*) minutes    Time: 16 minutes  Participants: Met with patient    Therapeutic Intervention Type: insight oriented psychotherapy, behavior modifying psychotherapy, supportive psychotherapy  Why chosen therapy is appropriate versus another modality: relevant to diagnosis, patient responds to this modality, evidence based practice    Target symptoms: depression, anxiety   Primary focus: anxiety, psychosocial  stressors  Psychotherapeutic techniques: supportive techniques; psycho-education    Outcome monitoring methods: self-report, observation    Patient's response to intervention:  The patient's response to intervention is accepting.    Progress toward goals:  The patient's progress toward goals is good.              Review of Systems   · PSYCHIATRIC: Pertinant items are noted in the narrative.  · CONSTITUTIONAL: No weight gain or loss.   · MUSCULOSKELETAL: No pain or stiffness of the joints.  · NEUROLOGIC: No weakness, sensory changes, seizures, confusion, memory loss, tremor or other abnormal movements.  · ENDOCRINE: No polydipsia or polyuria.  · INTEGUMENTARY: No rashes or lacerations.  · EYES: No exophthalmos, jaundice or blindness.  · ENT: No dizziness, tinnitus or hearing loss.  · RESPIRATORY: No shortness of breath.  · CARDIOVASCULAR: No tachycardia or chest pain.  · GASTROINTESTINAL: No nausea, vomiting, pain, constipation or diarrhea.  · GENITOURINARY: No frequency, dysuria or sexual dysfunction.  · HEMATOLOGIC/LYMPHATIC: No excessive bleeding, prolonged or excessive bleeding after dental extraction/injury.  · ALLERGIC/IMMUNOLOGIC: No allergic response to materials, foods or animals at this time.    Past Medical, Family and Social History: The patient's past medical, family and social history have been reviewed and updated as appropriate within the electronic medical record - see encounter notes.    Compliance: yes    Side effects: None    Risk Parameters:  Patient reports no suicidal ideation  Patient reports no homicidal ideation  Patient reports no self-injurious behavior  Patient reports no violent behavior           Exam (detailed: at least 9 elements; comprehensive: all 15 elements)   Constitutional  Vitals:  Most recent vital signs, dated less than 90 days prior to this appointment, were reviewed.   Vitals:    10/08/18 1103   BP: 118/66   Pulse: 68   Resp: 17   Weight: 101.5 kg (223 lb 12.3 oz)   Height:  "5' 8" (1.727 m)     Body mass index is 34.02 kg/m².         General:  unremarkable, age appropriate, casually dressed, neatly groomed, obese     Musculoskeletal  Muscle Strength/Tone:  no spasicity, no rigidity, no dyskinesia, no dystonia, no tremor   Gait & Station:  non-ataxic     Psychiatric  Speech:  no latency; no press, spontaneous   Mood & Affect:  steady "good"   congruent and appropriate, mood-congruent, full   Thought Process:  normal and logical, goal-directed   Associations:  intact   Thought Content:  normal, no suicidality, no homicidality, delusions, or paranoia   Insight:  intact, has awareness of illness   Judgement: behavior is adequate to circumstances, age appropriate   Orientation:  person, place, situation, time/date, day of week, month of year, year   Memory: intact for content of interview, able to remember recent events- yes, able to remember remote events- yes   Language: grossly intact, able to name, able to repeat   Attention Span & Concentration:  able to focus, completed tasks   Fund of Knowledge:  intact and appropriate to age and level of education, familiar with aspects of current personal life, 4 of 4 recent presidents     Assessment and Diagnosis   Status/Progress: Based on the examination today, the patient's problem(s) is/are improved and well controlled.  New problems have not been presented today.   Co-morbidities are not complicating management of the primary condition.  There are no active rule-out diagnoses for this patient at this time.     General Impression:  MDD, recurrent, moderate, in full remission  CHRISTINE  Panic Disorder without agoraphobia   Insomnia    IBS, GERD, ovarian cysts, obesity      Intervention/Counseling/Treatment Plan   Treatment Plan/Recommendations:     Medications:  -Continue Wellbutrin  mg po TID for depression and off-label CHRISTINE.   -Continue Lunesta 3 mg Po q HS prn insomnia; will taper down when able- not  needed over the last month  -Continue " Elavil 75 mg po q HS for adjunctive depression, anxiety and IBS  -Continue Topamax 50 mg po BID for off-label mood/anxiety and weight loss  -Xanax 0.5 mg po BID prn anxiety    -Continue Probiotic for adjunctive depression     Discussed diagnosis, risks and benefits of proposed treatment vs alternative treatments vs no treatment, and potential side effects of these treatments.  The patient expresses understanding of the above and displays the capacity to agree with this treatment given said understanding.  Patient also agrees that, currently, the benefits outweigh the risks and would like to pursue treatment at this time.    Therapy:  Pt is not interested in regular scheduled appointments at this time; psychotherapy provided today    Counseled:  Counseled on illness and tx options as well as exercise for MH   Counseled on weight loss    Labs:   Labs reviewed    Return to Clinic: 3 months, sooner if needed     Yuriy Vaz MD

## 2018-10-10 ENCOUNTER — DOCUMENTATION ONLY (OUTPATIENT)
Dept: BARIATRICS | Facility: CLINIC | Age: 38
End: 2018-10-10

## 2018-10-18 ENCOUNTER — TELEPHONE (OUTPATIENT)
Dept: BARIATRICS | Facility: CLINIC | Age: 38
End: 2018-10-18

## 2018-10-18 NOTE — TELEPHONE ENCOUNTER
----- Message from Cuca Henley sent at 10/18/2018  4:03 PM CDT -----  Contact: Self [phone]  Patient is requesting a call back in response to the call to schedule her surgery.    Patient may be reached at [phone].    Thank you.  LC

## 2018-10-19 ENCOUNTER — TELEPHONE (OUTPATIENT)
Dept: BARIATRICS | Facility: CLINIC | Age: 38
End: 2018-10-19

## 2018-10-19 DIAGNOSIS — K21.9 GASTROESOPHAGEAL REFLUX DISEASE, ESOPHAGITIS PRESENCE NOT SPECIFIED: ICD-10-CM

## 2018-10-19 DIAGNOSIS — M51.36 DDD (DEGENERATIVE DISC DISEASE), LUMBAR: ICD-10-CM

## 2018-10-19 DIAGNOSIS — Z01.818 PREOP TESTING: Primary | ICD-10-CM

## 2018-10-19 DIAGNOSIS — E66.01 MORBID OBESITY: ICD-10-CM

## 2018-10-19 DIAGNOSIS — E66.01 MORBID OBESITY: Primary | ICD-10-CM

## 2018-10-19 DIAGNOSIS — Z98.84 STATUS POST GASTRIC BYPASS FOR OBESITY: Primary | ICD-10-CM

## 2018-10-19 NOTE — TELEPHONE ENCOUNTER
Spoke with patient and scheduled preop appts/ surgery date/ post op appts. All dates and times agreed upon. Pt aware that must have PCP clearance within 30 days of surgery date signed and in chart for preop clearance. Pt aware that protein liquid diet start date is 11/1/18. Pt aware all appts can be seen in my [facility] patient portal at this time and appt reminders mailed to patient's address today by RN. Given office phone and fax number for any future questions/concerns.

## 2018-10-22 ENCOUNTER — TELEPHONE (OUTPATIENT)
Dept: INTERNAL MEDICINE | Facility: CLINIC | Age: 38
End: 2018-10-22

## 2018-10-22 NOTE — TELEPHONE ENCOUNTER
----- Message from Zach Marin sent at 10/22/2018  3:31 PM CDT -----  Contact: SELF  Anat Parks  MRN: [id number]  : 1980  PCP: Azalia Muri  Home Phone      [phone]  Work Phone      Not on file.  Mobile          [phone]      MESSAGE: SAYS SHE NEEDS LETTER SENT TO SURGEON SAYING SHE IS CLEARED FOR SURGERY. SAID AZALIA SAID APPT NOT NEEDED.     PHONE: [phone]

## 2018-10-22 NOTE — TELEPHONE ENCOUNTER
Returned pt's call. Pt reporting that she is scheduled to have gastric bypass on 11/8/18, and is having an EKG, and labs done on 10/24/18 for clearance ordered by Dr. Renee ([facility] doctor). Pt reporting that she was told by bariatric doctor that she does not have to see pcp for clearance; that all they need is a letter approving sx clearance. Informed that pcp will be notified and staff will call back with any new orders/instructions. Pt verbalized understanding. Please advise.

## 2018-10-23 NOTE — TELEPHONE ENCOUNTER
Called and informed pt that pcp reported that she needs to be seen for sx clearance. Pt verbalized understanding, and accepted appt on 10/25/18

## 2018-10-24 ENCOUNTER — OFFICE VISIT (OUTPATIENT)
Dept: BARIATRICS | Facility: CLINIC | Age: 38
End: 2018-10-24
Payer: OTHER GOVERNMENT

## 2018-10-24 ENCOUNTER — HOSPITAL ENCOUNTER (OUTPATIENT)
Dept: CARDIOLOGY | Facility: CLINIC | Age: 38
Discharge: HOME | End: 2018-10-24
Payer: OTHER GOVERNMENT

## 2018-10-24 VITALS
DIASTOLIC BLOOD PRESSURE: 88 MMHG | WEIGHT: 217.81 LBS | HEART RATE: 81 BPM | SYSTOLIC BLOOD PRESSURE: 134 MMHG | HEIGHT: 68 IN | BODY MASS INDEX: 33.01 KG/M2

## 2018-10-24 DIAGNOSIS — Z01.818 PREOP TESTING: ICD-10-CM

## 2018-10-24 DIAGNOSIS — G47.33 OSA (OBSTRUCTIVE SLEEP APNEA): ICD-10-CM

## 2018-10-24 DIAGNOSIS — K21.9 GASTROESOPHAGEAL REFLUX DISEASE, ESOPHAGITIS PRESENCE NOT SPECIFIED: ICD-10-CM

## 2018-10-24 DIAGNOSIS — E66.01 MORBID OBESITY: ICD-10-CM

## 2018-10-24 PROCEDURE — 99213 OFFICE O/P EST LOW 20 MIN: CPT | Mod: PBBFAC | Performed by: SURGERY

## 2018-10-24 PROCEDURE — 93005 ELECTROCARDIOGRAM TRACING: CPT | Mod: PBBFAC | Performed by: INTERNAL MEDICINE

## 2018-10-24 PROCEDURE — 99999 PR PBB SHADOW E&M-EST. PATIENT-LVL III: CPT | Mod: PBBFAC,,, | Performed by: SURGERY

## 2018-10-24 PROCEDURE — 99213 OFFICE O/P EST LOW 20 MIN: CPT | Mod: S$PBB,,, | Performed by: SURGERY

## 2018-10-24 PROCEDURE — 93010 ELECTROCARDIOGRAM REPORT: CPT | Mod: S$PBB,,, | Performed by: INTERNAL MEDICINE

## 2018-10-24 RX ORDER — FAMOTIDINE 10 MG/ML
20 INJECTION INTRAVENOUS ONCE
Status: CANCELLED | OUTPATIENT
Start: 2018-10-24 | End: 2018-10-24

## 2018-10-24 RX ORDER — SODIUM CITRATE AND CITRIC ACID MONOHYDRATE 334; 500 MG/5ML; MG/5ML
30 SOLUTION ORAL ONCE
Status: CANCELLED | OUTPATIENT
Start: 2018-10-24 | End: 2018-10-24

## 2018-10-24 RX ORDER — POLYETHYLENE GLYCOL 3350 17 G/17G
17 POWDER, FOR SOLUTION ORAL DAILY
Qty: 527 G | Refills: 3 | Status: SHIPPED | OUTPATIENT
Start: 2018-10-24 | End: 2018-12-11

## 2018-10-24 RX ORDER — OMEPRAZOLE 40 MG/1
40 CAPSULE, DELAYED RELEASE ORAL EVERY MORNING
Qty: 30 CAPSULE | Refills: 2 | Status: SHIPPED | OUTPATIENT
Start: 2018-10-24 | End: 2018-10-25 | Stop reason: SDUPTHER

## 2018-10-24 RX ORDER — TRAMADOL HYDROCHLORIDE 50 MG/1
50 TABLET ORAL EVERY 6 HOURS PRN
Status: ON HOLD | COMMUNITY
End: 2018-11-09 | Stop reason: HOSPADM

## 2018-10-24 RX ORDER — METOCLOPRAMIDE HYDROCHLORIDE 5 MG/ML
10 INJECTION INTRAMUSCULAR; INTRAVENOUS ONCE
Status: CANCELLED | OUTPATIENT
Start: 2018-10-24 | End: 2018-10-24

## 2018-10-24 RX ORDER — HEPARIN SODIUM 5000 [USP'U]/ML
5000 INJECTION, SOLUTION INTRAVENOUS; SUBCUTANEOUS ONCE
Status: CANCELLED | OUTPATIENT
Start: 2018-10-24 | End: 2018-10-24

## 2018-10-24 RX ORDER — PROMETHAZINE HYDROCHLORIDE 25 MG/1
SUPPOSITORY RECTAL
Qty: 15 SUPPOSITORY | Refills: 0 | Status: SHIPPED | OUTPATIENT
Start: 2018-10-24 | End: 2018-12-11

## 2018-10-24 RX ORDER — HYDROCODONE BITARTRATE AND ACETAMINOPHEN 7.5; 325 MG/15ML; MG/15ML
15 SOLUTION ORAL 4 TIMES DAILY PRN
Qty: 473 ML | Refills: 0 | Status: SHIPPED | OUTPATIENT
Start: 2018-10-24 | End: 2018-12-05

## 2018-10-24 RX ORDER — ONDANSETRON 8 MG/1
8 TABLET, ORALLY DISINTEGRATING ORAL EVERY 6 HOURS PRN
Qty: 30 TABLET | Refills: 0 | Status: SHIPPED | OUTPATIENT
Start: 2018-10-24 | End: 2018-10-25

## 2018-10-24 NOTE — H&P (VIEW-ONLY)
Subjective:  The patient is a 38 y.o. obese female who presents for pre op for gastric bypass surgery.  The patient has tried wt loss medications in conjunction with meal plans, 50 pounds regained when she stopped medications even with continuation of the meal plans. Has also tried walking 5 miles daily, only lost 15#. Lost 10# with Paleo plan, which she then regained. Followed with Dr. Robles for wt management and has 17# pounds with medication. Wt difficulty began in childhood.     All workup has been reviewed in clinic today and there is nothing on the review that would prevent us from proceeding with surgery.  All questions were answered in clinic today prior to leaving.  Body mass index is 33.61 kg/m².       Patient Active Problem List    Diagnosis Date Noted    Chronic back pain 05/09/2018    GERD (gastroesophageal reflux disease) 05/09/2018    Sleep arousal disorder     Right arm pain 04/26/2018    Fatty liver 04/19/2018    Hiatal hernia 01/22/2018    DDD (degenerative disc disease), lumbar 01/22/2018    Family history of colon cancer 10/19/2016    Family history of cervical cancer 10/19/2016    Obesity (BMI 30.0-34.9) 12/17/2015    IBS (irritable bowel syndrome) 11/17/2015    Psychophysiological insomnia 11/17/2015    CHRISTINE (generalized anxiety disorder) 07/28/2015    Panic disorder 07/28/2015    Major depressive disorder, recurrent, in full remission 07/28/2015    Dyspepsia 11/11/2014     Past Medical History:   Diagnosis Date    Abdominal pain, other specified site     Right lower abdomen    Acid reflux     Anxiety     Bilateral ovarian cysts     Mostly on right, but left also    Bulging lumbar disc     DDD (degenerative disc disease), lumbar     Degenerative disc disease Nov 2017    Lower back    Depression     Fatigue     Fatty liver     Fibroid tumor     Right ovary-sm    Heartburn     IBS (irritable bowel syndrome)     Menorrhagia     Ulcer 2014    Had an upper gi take  Prilosec      Past Surgical History:   Procedure Laterality Date     SECTION  3/1997; 2000    CHOLECYSTECTOMY  2009    COLONOSCOPY      CYSTOSCOPY N/A 2016    [facility] at [facility]    ESOPHAGOGASTRODUODENOSCOPY      ESOPHAGOGASTRODUODENOSCOPY      ESOPHAGOGASTRODUODENOSCOPY N/A 8/15/2018    Procedure: ESOPHAGOGASTRODUODENOSCOPY (EGD) - 73679;  Surgeon: Dalton Chirinos MD;  Location: [facility] (4TH FLR);  Service: Endoscopy;  Laterality: N/A;  status sleeve dysphagia, acid reflux    ESOPHAGOGASTRODUODENOSCOPY (EGD) N/A 2014    [facility]    ESOPHAGOGASTRODUODENOSCOPY (EGD) - 51940 N/A 8/15/2018    Performed by Dalton Chirinos MD at [facility] (4TH FLR)    gall bladder  2011    HYSTERECTOMY  2016    TL BLEEDING     HYSTERECTOMY-TOTAL LAPAROSCOPIC (TLH) N/A 2016    Performed by Kay Corona [facility]    LYSIS-ADHESIONS N/A 2016    Performed [facility]    SALPINGECTOMY-LAPAROSCOPIC Bilateral 2016    [facility] at [facility]    TUBAL LIGATION  2000        (Not in a hospital admission)  Review of patient's allergies indicates:  No Known Allergies   Social History     Tobacco Use    Smoking status: Former Smoker     Packs/day: 0.50     Years: 6.00     Pack years: 3.00     Types: Cigarettes     Start date: 11/10/1996     Last attempt to quit: 2012     Years since quittin.3    Smokeless tobacco: Never Used    Tobacco comment: quit 6 years ago   Substance Use Topics    Alcohol use: No     Alcohol/week: 0.5 oz     Types: 1 Standard drinks or equivalent per week     Frequency: Never      Family History   Problem Relation Age of Onset    Cancer Mother         cervical CA    Bipolar disorder Mother     Drug abuse Mother     Ovarian cancer Mother     Kidney disease Father     Alcohol abuse Father     Drug abuse Brother      "Colon cancer Maternal Grandmother     Cancer Maternal Grandmother     Obesity Maternal Grandmother     Stroke Maternal Grandmother     Cancer Maternal Aunt         uterine CA    Breast cancer Paternal Aunt     Cancer Paternal Aunt         breast CA        Review of Systems  Constitutional: negative for anorexia, chills and fatigue  Eyes: negative for icterus, irritation and redness  Respiratory: negative for cough and dyspnea on exertion  Cardiovascular: negative for chest pain and chest pressure/discomfort  Gastrointestinal: negative for abdominal pain, change in bowel habits, constipation and diarrhea  Musculoskeletal:negative for arthralgias and back pain  Neurological: negative for coordination problems and dizziness  Behavioral/Psych: negative for anxiety and bad mood    Objective:  Vital signs in last 24 hours:  Vitals:    10/24/18 1448   BP: 134/88   Pulse: 81   Weight: 98.8 kg (217 lb 13 oz)   Height: 5' 7.5" (1.715 m)       Presurgery Weight: 217 lbs  Excess Weight: 60  Weight History Current Weight Total Weight Loss EWL   10/26/2017 257 -257 -   1/22/2018 242 -242 -   4/19/2018 237 -237 -   7/19/2018 232 -232 -   10/24/2018 217 0 0       General appearance: alert, appears stated age and cooperative  Head: Normocephalic, without obvious abnormality, atraumatic  Eyes: negative findings: lids and lashes normal and conjunctivae and sclerae normal  Neck: supple, symmetrical, trachea midline and thyroid not enlarged, symmetric, no tenderness/mass/nodules  Lungs: clear to auscultation bilaterally  Heart: regular rate and rhythm, S1, S2 normal, no murmur, click, rub or gallop  Abdomen: soft, non-tender; bowel sounds normal; no masses,  no organomegaly  Extremities: extremities normal, atraumatic, no cyanosis or edema  Skin: Skin color, texture, turgor normal. No rashes or lesions  Neurologic: Grossly normal    Data Review:  Psych: Cleared  Nutrition: Cleared  PCP: Cleared  Echo: CONCLUSIONS     1 - Upper " limit of normal left ventricular enlargement.     2 - Normal left ventricular systolic function (EF 55-60%).     3 - No wall motion abnormalities.     4 - Normal left ventricular diastolic function.     5 - Normal right ventricular systolic function .     6 - Trivial aortic regurgitation.   EGD: Impression:           - Normal esophagus.                        - Esophagogastric landmarks identified.                        - Erythematous mucosa in the gastric body and                         antrum.                        - Gastritis.                        - Duodenitis. Biopsied.                        - Normal second portion of the duodenum. Biopsied.                        - Biopsies were obtained in the gastric body and in                         the gastric antrum.  CXR: WNL  EKG: Normal sinus rhythm  Nonspecific T wave abnormality Now present  Labs: No abnormalities that would prevent proceeding with surgery.    Assessment/Plan:  Morbid obesity with failure of conservative therapy.    The patient was informed that risks include, but are not limited to: death, leak, obstruction, bleeding, and sepsis. Any of these could require further surgery. Other risks include DVT, PE, pneumonia, wound dehiscence, hernia, wound infection, the need for dilatations and the inability to lose appropriate weight and keep it off.     We discussed that our goal is to ameliorate her medical problems and not to obtain a specific body mass index. She understands the risks and benefits and wishes to proceed with the procedure. She has signed a consent form.       Kale Renee MD

## 2018-10-24 NOTE — PROGRESS NOTES
Subjective:  The patient is a 38 y.o. obese female who presents for pre op for gastric bypass surgery.  The patient has tried wt loss medications in conjunction with meal plans, 50 pounds regained when she stopped medications even with continuation of the meal plans. Has also tried walking 5 miles daily, only lost 15#. Lost 10# with Paleo plan, which she then regained. Followed with Dr. Robles for wt management and has 17# pounds with medication. Wt difficulty began in childhood.     All workup has been reviewed in clinic today and there is nothing on the review that would prevent us from proceeding with surgery.  All questions were answered in clinic today prior to leaving.  Body mass index is 33.61 kg/m².       Patient Active Problem List    Diagnosis Date Noted    Chronic back pain 05/09/2018    GERD (gastroesophageal reflux disease) 05/09/2018    Sleep arousal disorder     Right arm pain 04/26/2018    Fatty liver 04/19/2018    Hiatal hernia 01/22/2018    DDD (degenerative disc disease), lumbar 01/22/2018    Family history of colon cancer 10/19/2016    Family history of cervical cancer 10/19/2016    Obesity (BMI 30.0-34.9) 12/17/2015    IBS (irritable bowel syndrome) 11/17/2015    Psychophysiological insomnia 11/17/2015    CHRISTINE (generalized anxiety disorder) 07/28/2015    Panic disorder 07/28/2015    Major depressive disorder, recurrent, in full remission 07/28/2015    Dyspepsia 11/11/2014     Past Medical History:   Diagnosis Date    Abdominal pain, other specified site     Right lower abdomen    Acid reflux     Anxiety     Bilateral ovarian cysts     Mostly on right, but left also    Bulging lumbar disc     DDD (degenerative disc disease), lumbar     Degenerative disc disease Nov 2017    Lower back    Depression     Fatigue     Fatty liver     Fibroid tumor     Right ovary-sm    Heartburn     IBS (irritable bowel syndrome)     Menorrhagia     Ulcer 2014    Had an upper gi take  Prilosec      Past Surgical History:   Procedure Laterality Date     SECTION  3/1997; 2000    CHOLECYSTECTOMY  2009    COLONOSCOPY      CYSTOSCOPY N/A 2016    [facility] at [facility]    ESOPHAGOGASTRODUODENOSCOPY      ESOPHAGOGASTRODUODENOSCOPY      ESOPHAGOGASTRODUODENOSCOPY N/A 8/15/2018    Procedure: ESOPHAGOGASTRODUODENOSCOPY (EGD) - 33235;  Surgeon: Dalton Chirinos MD;  Location: [facility] (4TH FLR);  Service: Endoscopy;  Laterality: N/A;  status sleeve dysphagia, acid reflux    ESOPHAGOGASTRODUODENOSCOPY (EGD) N/A 2014    [facility]    ESOPHAGOGASTRODUODENOSCOPY (EGD) - 37367 N/A 8/15/2018    Performed by Dalton [facility]irinos MD at [facility] (4TH FLR)    gall bladder  2011    HYSTERECTOMY  2016    TL BLEEDING     HYSTERECTOMY-TOTAL LAPAROSCOPIC (TLH) N/A 2016    [facility] at [facility] OR    LYSIS-ADHESIONS N/A 2016    Per[facility] by [facility] at [facility] OR    SALPINGECTOMY-LAPAROSCOPIC Bilateral 2016    Performed by [facility] at [facility] OR    TUBAL LIGATION  2000        (Not in a hospital admission)  Review of patient's allergies indicates:  No Known Allergies   Social History     Tobacco Use    Smoking status: Former Smoker     Packs/day: 0.50     Years: 6.00     Pack years: 3.00     Types: Cigarettes     Start date: 11/10/1996     Last attempt to quit: 2012     Years since quittin.3    Smokeless tobacco: Never Used    Tobacco comment: quit 6 years ago   Substance Use Topics    Alcohol use: No     Alcohol/week: 0.5 oz     Types: 1 Standard drinks or equivalent per week     Frequency: Never      Family History   Problem Relation Age of Onset    Cancer Mother         cervical CA    Bipolar disorder Mother     Drug abuse Mother     Ovarian cancer Mother     Kidney disease Father     Alcohol abuse Father     Drug abuse Brother      "Colon cancer Maternal Grandmother     Cancer Maternal Grandmother     Obesity Maternal Grandmother     Stroke Maternal Grandmother     Cancer Maternal Aunt         uterine CA    Breast cancer Paternal Aunt     Cancer Paternal Aunt         breast CA        Review of Systems  Constitutional: negative for anorexia, chills and fatigue  Eyes: negative for icterus, irritation and redness  Respiratory: negative for cough and dyspnea on exertion  Cardiovascular: negative for chest pain and chest pressure/discomfort  Gastrointestinal: negative for abdominal pain, change in bowel habits, constipation and diarrhea  Musculoskeletal:negative for arthralgias and back pain  Neurological: negative for coordination problems and dizziness  Behavioral/Psych: negative for anxiety and bad mood    Objective:  Vital signs in last 24 hours:  Vitals:    10/24/18 1448   BP: 134/88   Pulse: 81   Weight: 98.8 kg (217 lb 13 oz)   Height: 5' 7.5" (1.715 m)       Presurgery Weight: 217 lbs  Excess Weight: 60  Weight History Current Weight Total Weight Loss EWL   10/26/2017 257 -257 -   1/22/2018 242 -242 -   4/19/2018 237 -237 -   7/19/2018 232 -232 -   10/24/2018 217 0 0       General appearance: alert, appears stated age and cooperative  Head: Normocephalic, without obvious abnormality, atraumatic  Eyes: negative findings: lids and lashes normal and conjunctivae and sclerae normal  Neck: supple, symmetrical, trachea midline and thyroid not enlarged, symmetric, no tenderness/mass/nodules  Lungs: clear to auscultation bilaterally  Heart: regular rate and rhythm, S1, S2 normal, no murmur, click, rub or gallop  Abdomen: soft, non-tender; bowel sounds normal; no masses,  no organomegaly  Extremities: extremities normal, atraumatic, no cyanosis or edema  Skin: Skin color, texture, turgor normal. No rashes or lesions  Neurologic: Grossly normal    Data Review:  Psych: Cleared  Nutrition: Cleared  PCP: Cleared  Echo: CONCLUSIONS     1 - Upper " limit of normal left ventricular enlargement.     2 - Normal left ventricular systolic function (EF 55-60%).     3 - No wall motion abnormalities.     4 - Normal left ventricular diastolic function.     5 - Normal right ventricular systolic function .     6 - Trivial aortic regurgitation.   EGD: Impression:           - Normal esophagus.                        - Esophagogastric landmarks identified.                        - Erythematous mucosa in the gastric body and                         antrum.                        - Gastritis.                        - Duodenitis. Biopsied.                        - Normal second portion of the duodenum. Biopsied.                        - Biopsies were obtained in the gastric body and in                         the gastric antrum.  CXR: WNL  EKG: Normal sinus rhythm  Nonspecific T wave abnormality Now present  Labs: No abnormalities that would prevent proceeding with surgery.    Assessment/Plan:  Morbid obesity with failure of conservative therapy.    The patient was informed that risks include, but are not limited to: death, leak, obstruction, bleeding, and sepsis. Any of these could require further surgery. Other risks include DVT, PE, pneumonia, wound dehiscence, hernia, wound infection, the need for dilatations and the inability to lose appropriate weight and keep it off.     We discussed that our goal is to ameliorate her medical problems and not to obtain a specific body mass index. She understands the risks and benefits and wishes to proceed with the procedure. She has signed a consent form.       Kale Renee MD

## 2018-10-25 ENCOUNTER — OFFICE VISIT (OUTPATIENT)
Dept: INTERNAL MEDICINE | Facility: CLINIC | Age: 38
End: 2018-10-25
Payer: OTHER GOVERNMENT

## 2018-10-25 ENCOUNTER — HOSPITAL ENCOUNTER (OUTPATIENT)
Dept: RADIOLOGY | Facility: HOSPITAL | Age: 38
Discharge: HOME | End: 2018-10-25
Attending: NURSE PRACTITIONER
Payer: OTHER GOVERNMENT

## 2018-10-25 VITALS
BODY MASS INDEX: 32.89 KG/M2 | RESPIRATION RATE: 14 BRPM | OXYGEN SATURATION: 98 % | DIASTOLIC BLOOD PRESSURE: 72 MMHG | SYSTOLIC BLOOD PRESSURE: 112 MMHG | HEIGHT: 68 IN | WEIGHT: 217 LBS | HEART RATE: 93 BPM

## 2018-10-25 DIAGNOSIS — Z01.811 PRE-OP CHEST EXAM: ICD-10-CM

## 2018-10-25 DIAGNOSIS — E66.9 OBESITY (BMI 30.0-34.9): Primary | ICD-10-CM

## 2018-10-25 PROCEDURE — 99214 OFFICE O/P EST MOD 30 MIN: CPT | Mod: S$PBB,,, | Performed by: NURSE PRACTITIONER

## 2018-10-25 PROCEDURE — 99999 PR PBB SHADOW E&M-EST. PATIENT-LVL III: CPT | Mod: PBBFAC,,, | Performed by: NURSE PRACTITIONER

## 2018-10-25 PROCEDURE — 99213 OFFICE O/P EST LOW 20 MIN: CPT | Mod: PBBFAC | Performed by: NURSE PRACTITIONER

## 2018-10-25 PROCEDURE — 71046 X-RAY EXAM CHEST 2 VIEWS: CPT | Mod: TC

## 2018-10-25 RX ORDER — FERROUS SULFATE, DRIED 160(50) MG
1 TABLET, EXTENDED RELEASE ORAL
COMMUNITY
End: 2018-12-11

## 2018-10-25 NOTE — PROGRESS NOTES
Subjective:       Patient ID: Anat Parks is a 38 y.o. female.    Chief Complaint: Pre-op Exam    HPI: Pt presents to clinic today known to me with c/o needing a routine f/u and pre op. She is scheduled 11/8 with Dr Martinez at [facility]. She needs preop. She has already pre oped. She had labs     Lab Results   Component Value Date    HGBA1C 5.2 10/24/2018     BMP  Lab Results   Component Value Date     10/24/2018    K 4.0 10/24/2018     10/24/2018    CO2 24 10/24/2018    BUN 16 10/24/2018    CREATININE 0.8 10/24/2018    CALCIUM 9.3 10/24/2018    ANIONGAP 6 (L) 10/24/2018    ESTGFRAFRICA >60.0 10/24/2018    EGFRNONAA >60.0 10/24/2018     Lab Results   Component Value Date    ALT 40 10/24/2018    AST 31 10/24/2018    ALKPHOS 55 10/24/2018    BILITOT 0.4 10/24/2018       Lab Results   Component Value Date    WBC 8.20 10/24/2018    HGB 12.9 10/24/2018    HCT 41.1 10/24/2018    MCV 84 10/24/2018     10/24/2018     5/2018 CXR There is stable and unremarkable appearance of the cardiomediastinal shadow and both hilar regions.  The lungs are fully expanded and appear clear and the hemidiaphragms remain sharply outlined with no pleural effusion demonstrated.  There is stable appearance of the included osseous structures.  10/24 EKG Normal sinus rhythm    8/6/18 ECHO       1 - Upper limit of normal left ventricular enlargement.     2 - Normal left ventricular systolic function (EF 55-60%).     3 - No wall motion abnormalities.     4 - Normal left ventricular diastolic function.     5 - Normal right ventricular systolic function .     6 - Trivial aortic regurgitation.     Review of Systems   Constitutional: Negative for chills, fatigue, fever and unexpected weight change.   HENT: Negative for congestion, ear pain, sore throat and trouble swallowing.    Eyes: Negative for pain and visual disturbance.   Respiratory: Negative for cough, chest tightness and shortness of breath.    Cardiovascular: Negative for  chest pain, palpitations and leg swelling.   Gastrointestinal: Negative for abdominal distention, abdominal pain, constipation, diarrhea and vomiting.   Genitourinary: Negative for difficulty urinating, dysuria, flank pain, frequency and hematuria.   Musculoskeletal: Negative for back pain, gait problem, joint swelling, neck pain and neck stiffness.   Skin: Negative for rash and wound.   Neurological: Negative for dizziness, seizures, speech difficulty, light-headedness and headaches.       Objective:      Physical Exam   Constitutional: She is oriented to person, place, and time. She appears well-developed and well-nourished.   HENT:   Head: Normocephalic and atraumatic.   Right Ear: External ear normal.   Left Ear: External ear normal.   Nose: Nose normal.   Mouth/Throat: Oropharynx is clear and moist.   Eyes: Conjunctivae and EOM are normal. Pupils are equal, round, and reactive to light.   Neck: Normal range of motion. Neck supple. No thyromegaly present.   Cardiovascular: Normal rate, regular rhythm, normal heart sounds and intact distal pulses.   No murmur heard.  Pulmonary/Chest: Effort normal and breath sounds normal. No stridor. No respiratory distress. She has no wheezes. She has no rales.   Abdominal: Soft. Bowel sounds are normal. She exhibits no distension and no mass. There is no tenderness. There is no guarding.   Musculoskeletal: Normal range of motion. She exhibits no edema.   Lymphadenopathy:     She has no cervical adenopathy.   Neurological: She is alert and oriented to person, place, and time. No cranial nerve deficit.   Skin: Skin is warm and dry. Capillary refill takes less than 2 seconds.   Psychiatric: She has a normal mood and affect. Her behavior is normal. Judgment and thought content normal.   Nursing note and vitals reviewed.      Assessment:       1. Obesity (BMI 30.0-34.9)    2. Pre-op chest exam        Plan:     Problem List Items Addressed This Visit     Obesity (BMI 30.0-34.9) -  Primary      Other Visit Diagnoses     Pre-op chest exam        Relevant Orders    X-Ray Chest PA And Lateral          After CXR should be cleared for surgery

## 2018-11-01 ENCOUNTER — TELEPHONE (OUTPATIENT)
Dept: BARIATRICS | Facility: CLINIC | Age: 38
End: 2018-11-01

## 2018-11-01 NOTE — TELEPHONE ENCOUNTER
Called patient to reschedule 2 week post op appointments. Patient understands date, time and location of appointments.

## 2018-11-03 ENCOUNTER — PATIENT MESSAGE (OUTPATIENT)
Dept: PSYCHIATRY | Facility: CLINIC | Age: 38
End: 2018-11-03

## 2018-11-03 ENCOUNTER — TELEPHONE (OUTPATIENT)
Dept: BARIATRICS | Facility: CLINIC | Age: 38
End: 2018-11-03

## 2018-11-03 NOTE — TELEPHONE ENCOUNTER
----- Message from Felecia Mascorro RN sent at 10/19/2018  2:46 PM CDT -----  1 wk liquid diet starts 11/1/18

## 2018-11-03 NOTE — TELEPHONE ENCOUNTER
"Called patient to discuss liquid diet and vitamins.    Pt using BF powder + water and norman 2 squirts coffee/caffeine flavor; Premier shakes    Pt states she thought the liquid diet including "anything that melts" including yogurt and ice cream.    Discussion:  -  gms of protein per day  - 600-800 calories per day   - Less than 4gm sugar per shake  - SF, Decaf, non-carbonated Fluids  - No Fruits, juices, yogurt or pudding on liquid diet  - No vitamins or minerals for 1 week prior to surgery    D/c yogurt and ice cream. Switch to a Norman w/o caffeine after surgery. Or use decaf Cool Brew.    Reminded pt of pre-op and surgery dates.    Pt to call back with any questions.      "

## 2018-11-04 ENCOUNTER — PATIENT MESSAGE (OUTPATIENT)
Dept: BARIATRICS | Facility: CLINIC | Age: 38
End: 2018-11-04

## 2018-11-04 ENCOUNTER — PATIENT MESSAGE (OUTPATIENT)
Dept: PSYCHIATRY | Facility: CLINIC | Age: 38
End: 2018-11-04

## 2018-11-07 ENCOUNTER — ANESTHESIA EVENT (OUTPATIENT)
Dept: SURGERY | Facility: HOSPITAL | Age: 38
DRG: 621 | End: 2018-11-07
Payer: OTHER GOVERNMENT

## 2018-11-07 NOTE — ANESTHESIA PREPROCEDURE EVALUATION
[facility]  Anesthesia Pre-Operative Evaluation         Patient Name: Anat Parks  YOB: 1980  MRN: [id number]    SUBJECTIVE:     Pre-operative evaluation for Procedure(s) (LRB):  GASTROENTEROSTOMY, LAPAROSCOPIC (N/A)     11/07/2018    Anat Parks is a 38 y.o. female w/ a significant PMHx of GERD, anxiety, obesity who presents for gastric bypass after failing conservative management.    Patient now presents for the above procedure(s).      LDA: None documented.       Prev airway: Easy mask ventilation. Grade II view on DL, ETT placed without complication.     Drips: None documented.    Patient Active Problem List   Diagnosis    Dyspepsia    CHRISTINE (generalized anxiety disorder)    Panic disorder    Major depressive disorder, recurrent, in full remission    IBS (irritable bowel syndrome)    Psychophysiological insomnia    Obesity (BMI 30.0-34.9)    Family history of colon cancer    Family history of cervical cancer    Hiatal hernia    DDD (degenerative disc disease), lumbar    Fatty liver    Right arm pain    Sleep arousal disorder    Chronic back pain    GERD (gastroesophageal reflux disease)       Review of patient's allergies indicates:  No Known Allergies    Current Inpatient Medications:      No current facility-administered medications on file prior to encounter.      Current Outpatient Medications on File Prior to Encounter   Medication Sig Dispense Refill    ALPRAZolam (XANAX) 0.5 MG tablet Take 1 tablet (0.5 mg total) by mouth 2 (two) times daily as needed for anxiety. 60 tablet 2    amitriptyline (ELAVIL) 75 MG tablet Take 1 tablet (75 mg total) by mouth every evening. 90 tablet 0    buPROPion (WELLBUTRIN) 100 MG tablet Take 1 tablet (100 mg total) by mouth 3 (three) times daily. 270 tablet 0    dicyclomine (BENTYL) 20 mg tablet TAKE 1 TABLET FOUR TIMES A DAY BEFORE MEALS AND NIGHTLY 360 tablet 0    eszopiclone 3 mg Tab Take 1 tablet (3 mg  total) by mouth nightly as needed. 90 tablet 1    fluconazole (DIFLUCAN) 200 MG Tab Take 200 mg by mouth once.       lubiprostone (AMITIZA) 8 MCG Cap Take 1 capsule (8 mcg total) by mouth 2 (two) times daily with meals. 180 capsule 1    omeprazole (PRILOSEC) 40 MG capsule Take 1 capsule (40 mg total) by mouth 2 (two) times daily before meals. 60 capsule 2    topiramate (TOPAMAX) 50 MG tablet Take 1 tablet (50 mg total) by mouth 2 (two) times daily. 180 tablet 0       Past Surgical History:   Procedure Laterality Date     SECTION  3/1997; 2000    CHOLECYSTECTOMY  2009    COLONOSCOPY      CYSTOSCOPY N/A 2016    [facility] at [facility] OR    ESOPHAGOGASTRODUODENOSCOPY      ESOPHAGOGASTRODUODENOSCOPY      ESOPHAGOGASTRODUODENOSCOPY N/A 8/15/2018    Procedure: ESOPHAGOGASTRODUODENOSCOPY (EGD) - 02750;  Surgeon: Dalton Chirinos MD;  Location: [facility] (4TH FLR);  Service: Endoscopy;  Laterality: N/A;  status sleeve dysphagia, acid reflux    ESOPHAGOGASTRODUODENOSCOPY (EGD) N/A 2014    [facility]    ESOPHAGOGASTRODUODENOSCOPY (EGD) - 31982 N/A 8/15/2018    Performed by Dalton [facility]irinos MD at [facility] (4TH FLR)    gall bladder  2011    HYSTERECTOMY  2016    TL BLEEDING     HYSTERECTOMY-TOTAL LAPAROSCOPIC (TLH) N/A 2016    [facility] at [facility] OR    LYSIS-ADHESIONS N/A 2016    [facility] at [facility] OR    SALPINGECTOMY-LAPAROSCOPIC Bilateral 2016    [facility] at [facility] OR    TUBAL LIGATION  2000       Social History     Socioeconomic History    Marital status:      Spouse name: Not on file    Number of children: 2    Years of education: Not on file    Highest education level: Not on file   Social Needs    Financial resource strain: Not on file    Food insecurity - worry: Not on file    Food insecurity - inability:  Not on file    Transportation needs - medical: Not on file    Transportation needs - non-medical: Not on file   Occupational History    Not on file   Tobacco Use    Smoking status: Former Smoker     Packs/day: 0.50     Years: 6.00     Pack years: 3.00     Types: Cigarettes     Start date: 11/10/1996     Last attempt to quit: 2012     Years since quittin.4    Smokeless tobacco: Never Used    Tobacco comment: quit 6 years ago   Substance and Sexual Activity    Alcohol use: No     Alcohol/week: 0.5 oz     Types: 1 Standard drinks or equivalent per week     Frequency: Never    Drug use: No    Sexual activity: Yes     Partners: Male     Birth control/protection: See Surgical Hx, Other-see comments     Comment: Hysterectomy   Other Topics Concern    Patient feels they ought to cut down on drinking/drug use No    Patient annoyed by others criticizing their drinking/drug use No    Patient has felt bad or guilty about drinking/drug use No    Patient has had a drink/used drugs as an eye opener in the AM No   Social History Narrative     18 years    2 children       OBJECTIVE:     Vital Signs Range (Last 24H):         Significant Labs:  Lab Results   Component Value Date    WBC 8.20 10/24/2018    HGB 12.9 10/24/2018    HCT 41.1 10/24/2018     10/24/2018    CHOL 152 2018    TRIG 102 2018    HDL 44 2018    ALT 40 10/24/2018    AST 31 10/24/2018     10/24/2018    K 4.0 10/24/2018     10/24/2018    CREATININE 0.8 10/24/2018    BUN 16 10/24/2018    CO2 24 10/24/2018    TSH 1.581 2018    HGBA1C 5.2 10/24/2018       Diagnostic Studies: No relevant studies.    EKG:   Vent. Rate : 072 BPM     Atrial Rate : 072 BPM     P-R Int : 154 ms          QRS Dur : 096 ms      QT Int : 416 ms       P-R-T Axes : 015 017 028 degrees     QTc Int : 455 ms    Normal sinus rhythm  Normal ECG  When compared with ECG of 2018 10:13,  No significant change was found  Confirmed by  Anahi HOYT, Nishi ([id number]) on 10/26/2018 11:21:29 AM    2D ECHO:  Results for orders placed or performed during the hospital encounter of 08/06/18   2D Echo w/ Color Flow Doppler   Result Value Ref Range    Calculated EF 58 55 - 65    Diastolic Dysfunction No     Aortic Valve Regurgitation TRIVIAL          ASSESSMENT/PLAN:       Anesthesia Evaluation    I have reviewed the Patient Summary Reports.    I have reviewed the Nursing Notes.   I have reviewed the Medications.     Review of Systems  Anesthesia Hx:  No problems with previous Anesthesia  History of prior surgery of interest to airway management or planning: Previous anesthesia: General   Social:  Former Smoker, No Alcohol Use    Hematology/Oncology:  Hematology Normal   Oncology Normal     EENT/Dental:EENT/Dental Normal   Cardiovascular:  Cardiovascular Normal Exercise tolerance: good     Pulmonary:  Pulmonary Normal    Renal/:  Renal/ Normal     Hepatic/GI:   Hiatal Hernia, GERD Liver Disease,    Musculoskeletal:   Arthritis     Neurological:  Neurology Normal    Endocrine:  Endocrine Normal    Dermatological:  Skin Normal    Psych:   Psychiatric History          Physical Exam  General:  Obesity    Airway/Jaw/Neck:  Airway Findings: Mouth Opening: Normal Tongue: Normal  General Airway Assessment: Adult  Mallampati: II  TM Distance: Normal, at least 6 cm  Jaw/Neck Findings:  Neck ROM: Normal ROM      Dental:  Dental Findings: In tact        Mental Status:  Mental Status Findings:  Cooperative, Alert and Oriented         Anesthesia Plan  Type of Anesthesia, risks & benefits discussed:  Anesthesia Type:  general  Patient's Preference: GA  Intra-op Monitoring Plan: standard ASA monitors  Intra-op Monitoring Plan Comments:   Post Op Pain Control Plan: multimodal analgesia  Post Op Pain Control Plan Comments:   Induction:   IV  Beta Blocker:  Patient is not currently on a Beta-Blocker (No further documentation required).       Informed Consent: Patient understands  risks and agrees with Anesthesia plan.  Questions answered. Anesthesia consent signed with patient.  ASA Score: 3     Day of Surgery Review of History & Physical:            Ready For Surgery From Anesthesia Perspective.

## 2018-11-07 NOTE — TELEPHONE ENCOUNTER
Notified patient of arrival time to the [facility] 2nd floor Surgery Center at 8 with expected surgery start time 10 on 11/8/18.   Instructed patient regarding pre-op instructions including npo status, showering and preop medications to hold/take per anesthesia/preop.  Instructed patient on the s/s of dehydration and for patient to call at the first sign of dehydration.  Informed patient that someone from bariatrics will be call them 1 week post op to review diet/fluid intake and to ensure adequate hydration.   Pt verbalized understanding. Pt given office phone number for any additional questions/concerns.

## 2018-11-07 NOTE — PRE-PROCEDURE INSTRUCTIONS
PreOp Instructions given:   - Verbal medication information (what to hold and what to take)   - NPO guidelines   - Arrival place directions given; time to be given the day before procedure by the   Surgeon's Office   - Bathing with antibacterial soap   - Don't wear any jewelry or bring any valuables AM of surgery   - No makeup or moisturizer to face   - No perfume/cologne, powder, lotions or aftershave   Pt. verbalized understanding.     Denies any family history of side effects or issues with anesthesia or sedation.

## 2018-11-08 ENCOUNTER — HOSPITAL ENCOUNTER (INPATIENT)
Facility: HOSPITAL | Age: 38
LOS: 1 days | Discharge: HOME | DRG: 621 | End: 2018-11-09
Attending: SURGERY | Admitting: SURGERY
Payer: OTHER GOVERNMENT

## 2018-11-08 ENCOUNTER — ANESTHESIA (OUTPATIENT)
Dept: SURGERY | Facility: HOSPITAL | Age: 38
DRG: 621 | End: 2018-11-08
Payer: OTHER GOVERNMENT

## 2018-11-08 DIAGNOSIS — E66.9 OBESITY, UNSPECIFIED CLASSIFICATION, UNSPECIFIED OBESITY TYPE, UNSPECIFIED WHETHER SERIOUS COMORBIDITY PRESENT: ICD-10-CM

## 2018-11-08 DIAGNOSIS — Z98.84 STATUS POST GASTRIC BYPASS FOR OBESITY: Primary | ICD-10-CM

## 2018-11-08 DIAGNOSIS — E66.01 MORBID OBESITY: Primary | ICD-10-CM

## 2018-11-08 PROCEDURE — C9113 INJ PANTOPRAZOLE SODIUM, VIA: HCPCS | Performed by: STUDENT IN AN ORGANIZED HEALTH CARE EDUCATION/TRAINING PROGRAM

## 2018-11-08 PROCEDURE — S0030 INJECTION, METRONIDAZOLE: HCPCS | Performed by: STUDENT IN AN ORGANIZED HEALTH CARE EDUCATION/TRAINING PROGRAM

## 2018-11-08 PROCEDURE — 71000033 HC RECOVERY, INTIAL HOUR: Performed by: SURGERY

## 2018-11-08 PROCEDURE — 27201423 OPTIME MED/SURG SUP & DEVICES STERILE SUPPLY: Performed by: SURGERY

## 2018-11-08 PROCEDURE — 36000711: Performed by: SURGERY

## 2018-11-08 PROCEDURE — 63600175 PHARM REV CODE 636 W HCPCS: Performed by: STUDENT IN AN ORGANIZED HEALTH CARE EDUCATION/TRAINING PROGRAM

## 2018-11-08 PROCEDURE — 37000009 HC ANESTHESIA EA ADD 15 MINS: Performed by: SURGERY

## 2018-11-08 PROCEDURE — 25000003 PHARM REV CODE 250: Performed by: STUDENT IN AN ORGANIZED HEALTH CARE EDUCATION/TRAINING PROGRAM

## 2018-11-08 PROCEDURE — 0D164ZA BYPASS STOMACH TO JEJUNUM, PERCUTANEOUS ENDOSCOPIC APPROACH: ICD-10-PCS | Performed by: SURGERY

## 2018-11-08 PROCEDURE — 63600175 PHARM REV CODE 636 W HCPCS: Performed by: SURGERY

## 2018-11-08 PROCEDURE — D9220A PRA ANESTHESIA: Mod: ,,, | Performed by: ANESTHESIOLOGY

## 2018-11-08 PROCEDURE — 43644 LAP GASTRIC BYPASS/ROUX-EN-Y: CPT | Mod: ,,, | Performed by: SURGERY

## 2018-11-08 PROCEDURE — 11000001 HC ACUTE MED/SURG PRIVATE ROOM

## 2018-11-08 PROCEDURE — 27800903 OPTIME MED/SURG SUP & DEVICES OTHER IMPLANTS: Performed by: SURGERY

## 2018-11-08 PROCEDURE — 25000003 PHARM REV CODE 250: Performed by: SURGERY

## 2018-11-08 PROCEDURE — 63600175 PHARM REV CODE 636 W HCPCS

## 2018-11-08 PROCEDURE — 36000710: Performed by: SURGERY

## 2018-11-08 PROCEDURE — 0DJ08ZZ INSPECTION OF UPPER INTESTINAL TRACT, VIA NATURAL OR ARTIFICIAL OPENING ENDOSCOPIC: ICD-10-PCS | Performed by: SURGERY

## 2018-11-08 PROCEDURE — S0028 INJECTION, FAMOTIDINE, 20 MG: HCPCS | Performed by: SURGERY

## 2018-11-08 PROCEDURE — 37000008 HC ANESTHESIA 1ST 15 MINUTES: Performed by: SURGERY

## 2018-11-08 PROCEDURE — 71000039 HC RECOVERY, EACH ADD'L HOUR: Performed by: SURGERY

## 2018-11-08 DEVICE — SEAMGUARD ESCHELON 60 MM.: Type: IMPLANTABLE DEVICE | Site: ABDOMEN | Status: FUNCTIONAL

## 2018-11-08 RX ORDER — ONDANSETRON 2 MG/ML
INJECTION INTRAMUSCULAR; INTRAVENOUS
Status: DISPENSED
Start: 2018-11-08 | End: 2018-11-09

## 2018-11-08 RX ORDER — HYDROMORPHONE HCL IN 0.9% NACL 6 MG/30 ML
PATIENT CONTROLLED ANALGESIA SYRINGE INTRAVENOUS CONTINUOUS
Status: DISCONTINUED | OUTPATIENT
Start: 2018-11-08 | End: 2018-11-09

## 2018-11-08 RX ORDER — HYDROMORPHONE HYDROCHLORIDE 1 MG/ML
0.2 INJECTION, SOLUTION INTRAMUSCULAR; INTRAVENOUS; SUBCUTANEOUS EVERY 5 MIN PRN
Status: DISCONTINUED | OUTPATIENT
Start: 2018-11-08 | End: 2018-11-08 | Stop reason: HOSPADM

## 2018-11-08 RX ORDER — ACETAMINOPHEN 10 MG/ML
1000 INJECTION, SOLUTION INTRAVENOUS EVERY 8 HOURS
Status: DISCONTINUED | OUTPATIENT
Start: 2018-11-08 | End: 2018-11-09 | Stop reason: HOSPADM

## 2018-11-08 RX ORDER — METRONIDAZOLE 500 MG/100ML
INJECTION, SOLUTION INTRAVENOUS
Status: DISCONTINUED | OUTPATIENT
Start: 2018-11-08 | End: 2018-11-08

## 2018-11-08 RX ORDER — EPHEDRINE SULFATE 50 MG/ML
INJECTION, SOLUTION INTRAVENOUS
Status: DISCONTINUED | OUTPATIENT
Start: 2018-11-08 | End: 2018-11-08

## 2018-11-08 RX ORDER — HYDROMORPHONE HCL IN 0.9% NACL 6 MG/30 ML
PATIENT CONTROLLED ANALGESIA SYRINGE INTRAVENOUS
Status: COMPLETED
Start: 2018-11-08 | End: 2018-11-08

## 2018-11-08 RX ORDER — LORAZEPAM 2 MG/ML
0.5 INJECTION INTRAMUSCULAR EVERY 6 HOURS PRN
Status: DISCONTINUED | OUTPATIENT
Start: 2018-11-08 | End: 2018-11-09 | Stop reason: HOSPADM

## 2018-11-08 RX ORDER — SODIUM CITRATE AND CITRIC ACID MONOHYDRATE 334; 500 MG/5ML; MG/5ML
30 SOLUTION ORAL ONCE
Status: COMPLETED | OUTPATIENT
Start: 2018-11-08 | End: 2018-11-08

## 2018-11-08 RX ORDER — KETAMINE HYDROCHLORIDE 10 MG/ML
INJECTION, SOLUTION INTRAMUSCULAR; INTRAVENOUS
Status: DISCONTINUED | OUTPATIENT
Start: 2018-11-08 | End: 2018-11-08

## 2018-11-08 RX ORDER — SUCCINYLCHOLINE CHLORIDE 20 MG/ML
INJECTION INTRAMUSCULAR; INTRAVENOUS
Status: DISCONTINUED | OUTPATIENT
Start: 2018-11-08 | End: 2018-11-08

## 2018-11-08 RX ORDER — ENOXAPARIN SODIUM 100 MG/ML
40 INJECTION SUBCUTANEOUS EVERY 24 HOURS
Status: DISCONTINUED | OUTPATIENT
Start: 2018-11-08 | End: 2018-11-09 | Stop reason: HOSPADM

## 2018-11-08 RX ORDER — FAMOTIDINE 10 MG/ML
20 INJECTION INTRAVENOUS ONCE
Status: COMPLETED | OUTPATIENT
Start: 2018-11-08 | End: 2018-11-08

## 2018-11-08 RX ORDER — LIDOCAINE HYDROCHLORIDE 10 MG/ML
INJECTION, SOLUTION EPIDURAL; INFILTRATION; INTRACAUDAL; PERINEURAL
Status: DISCONTINUED | OUTPATIENT
Start: 2018-11-08 | End: 2018-11-08 | Stop reason: HOSPADM

## 2018-11-08 RX ORDER — DEXAMETHASONE SODIUM PHOSPHATE 4 MG/ML
INJECTION, SOLUTION INTRA-ARTICULAR; INTRALESIONAL; INTRAMUSCULAR; INTRAVENOUS; SOFT TISSUE
Status: DISCONTINUED | OUTPATIENT
Start: 2018-11-08 | End: 2018-11-08

## 2018-11-08 RX ORDER — BUPIVACAINE HYDROCHLORIDE AND EPINEPHRINE 5; 5 MG/ML; UG/ML
INJECTION, SOLUTION EPIDURAL; INTRACAUDAL; PERINEURAL
Status: DISCONTINUED | OUTPATIENT
Start: 2018-11-08 | End: 2018-11-08 | Stop reason: HOSPADM

## 2018-11-08 RX ORDER — ACETAMINOPHEN 10 MG/ML
INJECTION, SOLUTION INTRAVENOUS
Status: DISCONTINUED | OUTPATIENT
Start: 2018-11-08 | End: 2018-11-08

## 2018-11-08 RX ORDER — GLYCOPYRROLATE 0.2 MG/ML
INJECTION INTRAMUSCULAR; INTRAVENOUS
Status: DISCONTINUED | OUTPATIENT
Start: 2018-11-08 | End: 2018-11-08

## 2018-11-08 RX ORDER — SODIUM CHLORIDE 9 MG/ML
INJECTION, SOLUTION INTRAVENOUS CONTINUOUS
Status: DISCONTINUED | OUTPATIENT
Start: 2018-11-08 | End: 2018-11-08

## 2018-11-08 RX ORDER — OXYCODONE AND ACETAMINOPHEN 5; 325 MG/1; MG/1
1 TABLET ORAL
Status: DISCONTINUED | OUTPATIENT
Start: 2018-11-08 | End: 2018-11-08 | Stop reason: HOSPADM

## 2018-11-08 RX ORDER — FENTANYL CITRATE 50 UG/ML
INJECTION, SOLUTION INTRAMUSCULAR; INTRAVENOUS
Status: DISCONTINUED | OUTPATIENT
Start: 2018-11-08 | End: 2018-11-08

## 2018-11-08 RX ORDER — NEOSTIGMINE METHYLSULFATE 1 MG/ML
INJECTION, SOLUTION INTRAVENOUS
Status: DISCONTINUED | OUTPATIENT
Start: 2018-11-08 | End: 2018-11-08

## 2018-11-08 RX ORDER — SODIUM CHLORIDE 0.9 % (FLUSH) 0.9 %
3 SYRINGE (ML) INJECTION
Status: DISCONTINUED | OUTPATIENT
Start: 2018-11-08 | End: 2018-11-08

## 2018-11-08 RX ORDER — ONDANSETRON 2 MG/ML
4 INJECTION INTRAMUSCULAR; INTRAVENOUS ONCE AS NEEDED
Status: COMPLETED | OUTPATIENT
Start: 2018-11-08 | End: 2018-11-08

## 2018-11-08 RX ORDER — NALOXONE HCL 0.4 MG/ML
0.02 VIAL (ML) INJECTION
Status: DISCONTINUED | OUTPATIENT
Start: 2018-11-08 | End: 2018-11-09

## 2018-11-08 RX ORDER — LIDOCAINE HCL/PF 100 MG/5ML
SYRINGE (ML) INTRAVENOUS
Status: DISCONTINUED | OUTPATIENT
Start: 2018-11-08 | End: 2018-11-08

## 2018-11-08 RX ORDER — OMEPRAZOLE 40 MG/1
40 CAPSULE, DELAYED RELEASE ORAL EVERY MORNING
Qty: 30 CAPSULE | Refills: 2 | Status: CANCELLED | OUTPATIENT
Start: 2018-11-08

## 2018-11-08 RX ORDER — ONDANSETRON 8 MG/1
8 TABLET, ORALLY DISINTEGRATING ORAL EVERY 6 HOURS PRN
Qty: 30 TABLET | Refills: 0 | Status: CANCELLED | OUTPATIENT
Start: 2018-11-08

## 2018-11-08 RX ORDER — PROPOFOL 10 MG/ML
VIAL (ML) INTRAVENOUS
Status: DISCONTINUED | OUTPATIENT
Start: 2018-11-08 | End: 2018-11-08

## 2018-11-08 RX ORDER — HYDROCODONE BITARTRATE AND ACETAMINOPHEN 7.5; 325 MG/15ML; MG/15ML
15 SOLUTION ORAL EVERY 4 HOURS PRN
Status: DISCONTINUED | OUTPATIENT
Start: 2018-11-09 | End: 2018-11-09 | Stop reason: HOSPADM

## 2018-11-08 RX ORDER — LIDOCAINE HYDROCHLORIDE 10 MG/ML
1 INJECTION, SOLUTION EPIDURAL; INFILTRATION; INTRACAUDAL; PERINEURAL ONCE
Status: COMPLETED | OUTPATIENT
Start: 2018-11-08 | End: 2018-11-08

## 2018-11-08 RX ORDER — MIDAZOLAM HYDROCHLORIDE 1 MG/ML
INJECTION, SOLUTION INTRAMUSCULAR; INTRAVENOUS
Status: DISCONTINUED | OUTPATIENT
Start: 2018-11-08 | End: 2018-11-08

## 2018-11-08 RX ORDER — CEFAZOLIN SODIUM 1 G/3ML
INJECTION, POWDER, FOR SOLUTION INTRAMUSCULAR; INTRAVENOUS
Status: DISCONTINUED | OUTPATIENT
Start: 2018-11-08 | End: 2018-11-08

## 2018-11-08 RX ORDER — ONDANSETRON 2 MG/ML
4 INJECTION INTRAMUSCULAR; INTRAVENOUS EVERY 6 HOURS PRN
Status: DISCONTINUED | OUTPATIENT
Start: 2018-11-08 | End: 2018-11-09 | Stop reason: HOSPADM

## 2018-11-08 RX ORDER — PANTOPRAZOLE SODIUM 40 MG/10ML
40 INJECTION, POWDER, LYOPHILIZED, FOR SOLUTION INTRAVENOUS 2 TIMES DAILY
Status: DISCONTINUED | OUTPATIENT
Start: 2018-11-08 | End: 2018-11-09 | Stop reason: HOSPADM

## 2018-11-08 RX ORDER — OMEPRAZOLE 40 MG/1
40 CAPSULE, DELAYED RELEASE ORAL EVERY MORNING
Qty: 30 CAPSULE | Refills: 3 | Status: SHIPPED | OUTPATIENT
Start: 2018-11-08 | End: 2018-11-09 | Stop reason: HOSPADM

## 2018-11-08 RX ORDER — METOCLOPRAMIDE HYDROCHLORIDE 5 MG/ML
10 INJECTION INTRAMUSCULAR; INTRAVENOUS ONCE
Status: COMPLETED | OUTPATIENT
Start: 2018-11-08 | End: 2018-11-08

## 2018-11-08 RX ORDER — SODIUM CHLORIDE 9 MG/ML
INJECTION, SOLUTION INTRAVENOUS CONTINUOUS
Status: DISCONTINUED | OUTPATIENT
Start: 2018-11-08 | End: 2018-11-09

## 2018-11-08 RX ORDER — ROCURONIUM BROMIDE 10 MG/ML
INJECTION, SOLUTION INTRAVENOUS
Status: DISCONTINUED | OUTPATIENT
Start: 2018-11-08 | End: 2018-11-08

## 2018-11-08 RX ORDER — HEPARIN SODIUM 5000 [USP'U]/ML
5000 INJECTION, SOLUTION INTRAVENOUS; SUBCUTANEOUS ONCE
Status: COMPLETED | OUTPATIENT
Start: 2018-11-08 | End: 2018-11-08

## 2018-11-08 RX ORDER — PHENYLEPHRINE HYDROCHLORIDE 10 MG/ML
INJECTION INTRAVENOUS
Status: DISCONTINUED | OUTPATIENT
Start: 2018-11-08 | End: 2018-11-08

## 2018-11-08 RX ORDER — SODIUM CHLORIDE 0.9 % (FLUSH) 0.9 %
3 SYRINGE (ML) INJECTION
Status: DISCONTINUED | OUTPATIENT
Start: 2018-11-08 | End: 2018-11-08 | Stop reason: HOSPADM

## 2018-11-08 RX ORDER — LIDOCAINE HYDROCHLORIDE AND EPINEPHRINE 10; 10 MG/ML; UG/ML
INJECTION, SOLUTION INFILTRATION; PERINEURAL
Status: DISCONTINUED | OUTPATIENT
Start: 2018-11-08 | End: 2018-11-08 | Stop reason: HOSPADM

## 2018-11-08 RX ADMIN — FAMOTIDINE 20 MG: 10 INJECTION, SOLUTION INTRAVENOUS at 08:11

## 2018-11-08 RX ADMIN — SODIUM CITRATE AND CITRIC ACID MONOHYDRATE 30 ML: 500; 334 SOLUTION ORAL at 08:11

## 2018-11-08 RX ADMIN — Medication 0.2 MG: at 02:11

## 2018-11-08 RX ADMIN — SODIUM CHLORIDE: 0.9 INJECTION, SOLUTION INTRAVENOUS at 01:11

## 2018-11-08 RX ADMIN — DEXAMETHASONE SODIUM PHOSPHATE 8 MG: 4 INJECTION, SOLUTION INTRAMUSCULAR; INTRAVENOUS at 11:11

## 2018-11-08 RX ADMIN — EPHEDRINE SULFATE 5 MG: 50 INJECTION, SOLUTION INTRAMUSCULAR; INTRAVENOUS; SUBCUTANEOUS at 12:11

## 2018-11-08 RX ADMIN — ROCURONIUM BROMIDE 20 MG: 10 INJECTION, SOLUTION INTRAVENOUS at 11:11

## 2018-11-08 RX ADMIN — ENOXAPARIN SODIUM 40 MG: 100 INJECTION SUBCUTANEOUS at 05:11

## 2018-11-08 RX ADMIN — PANTOPRAZOLE SODIUM 40 MG: 40 INJECTION, POWDER, FOR SOLUTION INTRAVENOUS at 08:11

## 2018-11-08 RX ADMIN — Medication: at 06:11

## 2018-11-08 RX ADMIN — ONDANSETRON 4 MG: 2 INJECTION INTRAMUSCULAR; INTRAVENOUS at 08:11

## 2018-11-08 RX ADMIN — PHENYLEPHRINE HYDROCHLORIDE 200 MCG: 10 INJECTION INTRAVENOUS at 12:11

## 2018-11-08 RX ADMIN — KETAMINE HYDROCHLORIDE 20 MG: 10 INJECTION, SOLUTION INTRAMUSCULAR; INTRAVENOUS at 11:11

## 2018-11-08 RX ADMIN — SUCCINYLCHOLINE CHLORIDE 120 MG: 20 INJECTION, SOLUTION INTRAMUSCULAR; INTRAVENOUS at 11:11

## 2018-11-08 RX ADMIN — MIDAZOLAM HYDROCHLORIDE 2 MG: 1 INJECTION, SOLUTION INTRAMUSCULAR; INTRAVENOUS at 11:11

## 2018-11-08 RX ADMIN — LIDOCAINE HYDROCHLORIDE 2 MG: 10 INJECTION, SOLUTION EPIDURAL; INFILTRATION; INTRACAUDAL; PERINEURAL at 08:11

## 2018-11-08 RX ADMIN — Medication 0.2 MG: at 01:11

## 2018-11-08 RX ADMIN — Medication 6 MG: at 01:11

## 2018-11-08 RX ADMIN — CEFAZOLIN 3 G: 330 INJECTION, POWDER, FOR SOLUTION INTRAMUSCULAR; INTRAVENOUS at 11:11

## 2018-11-08 RX ADMIN — FENTANYL CITRATE 25 MCG: 50 INJECTION, SOLUTION INTRAMUSCULAR; INTRAVENOUS at 01:11

## 2018-11-08 RX ADMIN — PROMETHAZINE HYDROCHLORIDE 6.25 MG: 25 INJECTION INTRAMUSCULAR; INTRAVENOUS at 05:11

## 2018-11-08 RX ADMIN — HEPARIN SODIUM 5000 UNITS: 5000 INJECTION, SOLUTION INTRAVENOUS; SUBCUTANEOUS at 08:11

## 2018-11-08 RX ADMIN — METOCLOPRAMIDE 10 MG: 5 INJECTION, SOLUTION INTRAMUSCULAR; INTRAVENOUS at 08:11

## 2018-11-08 RX ADMIN — KETAMINE HYDROCHLORIDE 10 MG: 10 INJECTION, SOLUTION INTRAMUSCULAR; INTRAVENOUS at 12:11

## 2018-11-08 RX ADMIN — PHENYLEPHRINE HYDROCHLORIDE 200 MCG: 10 INJECTION INTRAVENOUS at 01:11

## 2018-11-08 RX ADMIN — ACETAMINOPHEN 1000 MG: 10 INJECTION, SOLUTION INTRAVENOUS at 08:11

## 2018-11-08 RX ADMIN — METRONIDAZOLE 500 MG: 500 SOLUTION INTRAVENOUS at 11:11

## 2018-11-08 RX ADMIN — ACETAMINOPHEN 1000 MG: 10 INJECTION, SOLUTION INTRAVENOUS at 11:11

## 2018-11-08 RX ADMIN — PHENYLEPHRINE HYDROCHLORIDE 100 MCG: 10 INJECTION INTRAVENOUS at 12:11

## 2018-11-08 RX ADMIN — SODIUM CHLORIDE: 0.9 INJECTION, SOLUTION INTRAVENOUS at 11:11

## 2018-11-08 RX ADMIN — GLYCOPYRROLATE 0.6 MG: 0.2 INJECTION, SOLUTION INTRAMUSCULAR; INTRAVENOUS at 01:11

## 2018-11-08 RX ADMIN — SODIUM CHLORIDE, SODIUM GLUCONATE, SODIUM ACETATE, POTASSIUM CHLORIDE, MAGNESIUM CHLORIDE, SODIUM PHOSPHATE, DIBASIC, AND POTASSIUM PHOSPHATE: .53; .5; .37; .037; .03; .012; .00082 INJECTION, SOLUTION INTRAVENOUS at 12:11

## 2018-11-08 RX ADMIN — NEOSTIGMINE METHYLSULFATE 5 MG: 1 INJECTION INTRAVENOUS at 01:11

## 2018-11-08 RX ADMIN — PROPOFOL 200 MG: 10 INJECTION, EMULSION INTRAVENOUS at 11:11

## 2018-11-08 RX ADMIN — ROCURONIUM BROMIDE 5 MG: 10 INJECTION, SOLUTION INTRAVENOUS at 12:11

## 2018-11-08 RX ADMIN — ROCURONIUM BROMIDE 10 MG: 10 INJECTION, SOLUTION INTRAVENOUS at 11:11

## 2018-11-08 RX ADMIN — LIDOCAINE HYDROCHLORIDE 80 MG: 20 INJECTION, SOLUTION INTRAVENOUS at 11:11

## 2018-11-08 RX ADMIN — SODIUM CHLORIDE: 0.9 INJECTION, SOLUTION INTRAVENOUS at 08:11

## 2018-11-08 RX ADMIN — ONDANSETRON 4 MG: 2 INJECTION INTRAMUSCULAR; INTRAVENOUS at 01:11

## 2018-11-08 RX ADMIN — EPHEDRINE SULFATE 10 MG: 50 INJECTION, SOLUTION INTRAMUSCULAR; INTRAVENOUS; SUBCUTANEOUS at 12:11

## 2018-11-08 RX ADMIN — ROCURONIUM BROMIDE 10 MG: 10 INJECTION, SOLUTION INTRAVENOUS at 12:11

## 2018-11-08 RX ADMIN — FENTANYL CITRATE 50 MCG: 50 INJECTION, SOLUTION INTRAMUSCULAR; INTRAVENOUS at 12:11

## 2018-11-08 NOTE — NURSING TRANSFER
Nursing Transfer Note      11/8/2018     Transfer to 524    Transfer via stretcher    Transfer with portable 02/pca/iv pump    Transported by pct    Medicines sent: dilaudid pca    Chart send with patient: yes    Notified:     Patient reassessed at: 11/8/18 @ 8931

## 2018-11-08 NOTE — INTERVAL H&P NOTE
The patient has been examined and the H&P has been reviewed:    I concur with the findings and no changes have occurred since H&P was written.    Anesthesia/Surgery risks, benefits and alternative options discussed and understood by patient/family.          Active Hospital Problems    Diagnosis  POA    BMI 33.0-33.9,adult [Z68.33]  Not Applicable      Resolved Hospital Problems   No resolved problems to display.

## 2018-11-08 NOTE — OP NOTE
DATE OF PROCEDURE: 11/08/2018  SERVICE: Bariatric Surgery.   Surgeon(s) and Role:     * Kale Renee Jr., MD - Primary     * Kade Villafuerte MD - Resident - Assisting  PREOPERATIVE DIAGNOSES: Morbid obesity with a Body mass index is 32.11 kg/m².   along with GERD .    POSTOPERATIVE DIAGNOSES: Same  PROCEDURE: Laparoscopic Adilson-en-Y gastric bypass and EGD.   ANESTHESIA: General endotracheal and local.   DESCRIPTION OF PROCEDURE: The patient was taken to the Operating Room, placed under general anesthesia and prepped and draped in a sterile fashion. At this   time, an incision was made approximately 15 cm from the xiphoid and 5 cm to the   left of midline after infiltrating with local anesthetic. Using Optiview view   trocar, intra-abdominal access was obtained under direct visualization. At this   time, once the port was placed, pneumoperitoneum was obtained and further ports   were placed including bilateral anterior axillary subcostal 5 mm ports and   midclavicular subcostal port on the right and a second 12 port approximately 15   cm from the xiphoid just to right of the midline. All these ports were placed   under direct visualization after infiltrating with local anesthetic. Once the   ports were placed, we began with the jejunojejunostomy. We elevated the omentum   and transected this with a Harmonic scalpel to the transverse colon. The   transverse colon was then elevated. We found the ligament of Treitz and then   ran the small bowel 45 cm and transected the bowel at this point with a white   load stapler with SeamGuard. We then took several centimeters of mesentery with   the Harmonic scalpel to allow extra length on our Adilson limb. Once we had   completed this, we then ran the distal portion of the small bowel 150 cm and   tacked this to the proximal small bowel with a 2-0 Surgidac suture on an   EndoStitch device. This was elevated and a small enterotomy was made on either   side of the small  bowel. A white load stapler was placed in either limb of the   small bowel and fired creating a jejunojejunostomy. The jejunojejunostomy was   then elevated and the common enterotomy was then closed with a 2-0 Surgidac   suture on an EndoStitch device in 2-layer running fashion. Once this was   complete, we also closed the mesenteric defect, again with a 2-0 Surgidac on an   EndoStitch device in running fashion. This then completed, the jejunojejunostomy was inspected and appeared to be in very good condition. We turned our attention then to   formation of the gastric pouch. A liver retractor was placed. The patient was   placed in steep reverse Trendelenburg. An area on the lesser curve at the   second bridging vein was identified and using the Harmonic scalpel, the   gastrohepatic ligament was opened and the lesser sac was exposed. We then   placed a blue load stapler transversely across the stomach after ensuring there   was nothing in the stomach from anesthesia perspective. We fired the stapler   transversely. We then fired further staple loads, blue with SeamGuard towards   the angle of His, completing the pouch. Once the pouch was complete, we made a   small hole with the Harmonic scalpel in the transverse portion of the gastric   pouch and an anvil was guided by Anesthesia into place. This was done by   placing the OG portion into the pouch. It was then brought out through the   small hole that was made and then this was used to pull the anvil into the pouch   and the OG portion was cut and removed and passed off the table. The Adilson limb   was then brought up and it was opened on the end with the Harmonic scalpel to   allow for the circular stapler device. At this time, the incision approximately   15 cm from the xiphoid just to right of midline was enlarged to accommodate the   25 mm circular stapler with 3.5 mm staple height. We opened this skin at the   skin level and then from the fascial level we opened  the fascia with the   Harmonic scalpel and 0-Vicryl suture was placed in figure-of-eight fashion;   however, it was not tied down at this time and a wound retractor was placed into   this incision as well. The 25 mm circular stapler was placed into this   incision and then placed into the Adilson limb. The spike was brought out through   the side of the small bowel and attached to the anvil and the gastric pouch.   This was closed ensuring there was no tissue in between here and it was fired   creating the gastrojejunostomy. At this time, a 2-0 Polysorb suture was placed   in U-fashion on either side of the anastomosis for tension relief. The end of   the Adilson limb, which had been opened for placement of the circular stapler, was   then removed. This was done by taking the mesentery with Harmonic scalpel and   firing across the small bowel at the level of the gastric pouch with a white   load stapler with SeamGuard. The end of the small bowel that was removed was   then passed off the table. Everything was inspected and appeared to be in very   good condition. We then placed a bowel clamp over the Adilson limb and turned our   attention to an EGD. The scope was placed down the oropharynx and into the   esophagus followed down the esophagus and into the gastric pouch. At this time,   fluid was used to submerge the gastrojejunostomy and air was insufflated into   the gastric pouch. The Adilson limb dilated appropriately. There was air heard   coming back from the mouth and there were no signs of bubbles or any other   abnormalities. At this time, air was suctioned from the Adilson limb and the pouch   and the scope was removed under direct visualization. We turned our attention   back to laparoscopic view. At this time, all the fluid was suctioned from the   abdominal cavity. The liver retractor was removed. The ports were removed   under direct visualization. The suture, which had been placed in the fascia of   the incision 15  cm from the xiphoid just to right of midline, was tied down   closing the fascia of this incision and this incision was then irrigated   copiously. The skin of all 5 port sites were then closed with 4-0 Monocryl   suture in subcuticular fashion. Mastisol and Steri-Strips were placed and the   patient was allowed to awake from general anesthesia and transferred to bed for   transport to Recovery.   COMPLICATIONS: None.   SPONGE COUNT: Correct.   BLOOD LOSS: 15 mL.   FLUIDS: Per Anesthesia.   BLOOD GIVEN: None.   DRAINS: None.   SPECIMENS: None.   CONDITION OF PATIENT: Good.   I was present for the entire procedure.

## 2018-11-08 NOTE — TELEPHONE ENCOUNTER
Per Dr. Renee, the patient's  stated that there is a problem with 2 prescriptions.  Called the pharmacy- spoke Ms. Cary.   She stated that Prilosec and Zofran were cancelled per MA in Internal Medicine.   Prescriptions to be re-ordered.  Routed to Dr. Renee to sign

## 2018-11-08 NOTE — PLAN OF CARE
Problem: Patient Care Overview  Goal: Plan of Care Review  Outcome: Ongoing (interventions implemented as appropriate)  Vital signs stable. Afebrile. Alert, oriented and following commands. Pain and nausea controlled with PRN meds. 2LNC. Lap sites remain CDI. NPO. POC reviewed with pt and . Understanding verbalized.

## 2018-11-08 NOTE — TRANSFER OF CARE
"Anesthesia Transfer of Care Note    Patient: Anat Parks    Procedure(s) Performed: Procedure(s) (LRB):  GASTROENTEROSTOMY, LAPAROSCOPIC (N/A)    Patient location: PACU    Anesthesia Type: general    Transport from OR: Transported from OR on 6-10 L/min O2 by face mask with adequate spontaneous ventilation    Post pain: adequate analgesia    Post assessment: no apparent anesthetic complications and tolerated procedure well    Post vital signs: stable    Level of consciousness: awake and alert    Nausea/Vomiting: no nausea/vomiting    Complications: none    Transfer of care protocol was followed      Last vitals:   Visit Vitals  /66 (BP Location: Right arm, Patient Position: Lying)   Pulse 88   Temp 37 °C (98.6 °F) (Oral)   Resp 18   Ht 5' 8" (1.727 m)   Wt 95.8 kg (211 lb 3.2 oz)   LMP 11/16/2016 (Exact Date)   SpO2 100%   Breastfeeding? No   BMI 32.11 kg/m²     "

## 2018-11-08 NOTE — BRIEF OP NOTE
[facility]  Brief Operative Note    SUMMARY     Surgery Date: 11/8/2018     Surgeon(s) and Role:     * Kale Renee Jr., MD - Primary     * Kade Villafuerte MD - Resident - Assisting    Pre-op Diagnosis:  Morbid obesity [E66.01]  DDD (degenerative disc disease), lumbar [M51.36]  Gastroesophageal reflux disease, esophagitis presence not specified [K21.9]    Post-op Diagnosis:  Post-Op Diagnosis Codes:     * Morbid obesity [E66.01]     * DDD (degenerative disc disease), lumbar [M51.36]     * Gastroesophageal reflux disease, esophagitis presence not specified [K21.9]    Procedure(s) (LRB):  GASTROENTEROSTOMY, LAPAROSCOPIC (N/A)    Anesthesia: General    Description of Procedure:   1. Laparoscopic Adilson-en-Y gastric bypass  2. EGD    Description of the findings of the procedure: see op note    Estimated Blood Loss: minimal         Specimens:   Specimen (12h ago, onward)    None

## 2018-11-09 VITALS
HEART RATE: 75 BPM | SYSTOLIC BLOOD PRESSURE: 105 MMHG | WEIGHT: 211.19 LBS | DIASTOLIC BLOOD PRESSURE: 54 MMHG | OXYGEN SATURATION: 99 % | RESPIRATION RATE: 18 BRPM | HEIGHT: 68 IN | BODY MASS INDEX: 32.01 KG/M2 | TEMPERATURE: 98 F

## 2018-11-09 LAB
ANION GAP SERPL CALC-SCNC: 7 MMOL/L
BASOPHILS # BLD AUTO: 0.02 K/UL
BASOPHILS NFR BLD: 0.1 %
BUN SERPL-MCNC: 8 MG/DL
CALCIUM SERPL-MCNC: 8.5 MG/DL
CHLORIDE SERPL-SCNC: 111 MMOL/L
CO2 SERPL-SCNC: 24 MMOL/L
CREAT SERPL-MCNC: 0.7 MG/DL
DIFFERENTIAL METHOD: ABNORMAL
EOSINOPHIL # BLD AUTO: 0 K/UL
EOSINOPHIL NFR BLD: 0 %
ERYTHROCYTE [DISTWIDTH] IN BLOOD BY AUTOMATED COUNT: 14.1 %
EST. GFR  (AFRICAN AMERICAN): >60 ML/MIN/1.73 M^2
EST. GFR  (NON AFRICAN AMERICAN): >60 ML/MIN/1.73 M^2
GLUCOSE SERPL-MCNC: 120 MG/DL
HCT VFR BLD AUTO: 36.2 %
HGB BLD-MCNC: 11.5 G/DL
IMM GRANULOCYTES # BLD AUTO: 0.11 K/UL
IMM GRANULOCYTES NFR BLD AUTO: 0.7 %
LYMPHOCYTES # BLD AUTO: 1.1 K/UL
LYMPHOCYTES NFR BLD: 6.8 %
MAGNESIUM SERPL-MCNC: 2.2 MG/DL
MCH RBC QN AUTO: 27.3 PG
MCHC RBC AUTO-ENTMCNC: 31.8 G/DL
MCV RBC AUTO: 86 FL
MONOCYTES # BLD AUTO: 1.3 K/UL
MONOCYTES NFR BLD: 8.5 %
NEUTROPHILS # BLD AUTO: 13.1 K/UL
NEUTROPHILS NFR BLD: 83.9 %
NRBC BLD-RTO: 0 /100 WBC
PHOSPHATE SERPL-MCNC: 1.6 MG/DL
PLATELET # BLD AUTO: 226 K/UL
PMV BLD AUTO: 10.8 FL
POTASSIUM SERPL-SCNC: 4.2 MMOL/L
RBC # BLD AUTO: 4.21 M/UL
SODIUM SERPL-SCNC: 142 MMOL/L
WBC # BLD AUTO: 15.66 K/UL

## 2018-11-09 PROCEDURE — 25000003 PHARM REV CODE 250: Performed by: STUDENT IN AN ORGANIZED HEALTH CARE EDUCATION/TRAINING PROGRAM

## 2018-11-09 PROCEDURE — 83735 ASSAY OF MAGNESIUM: CPT

## 2018-11-09 PROCEDURE — 84100 ASSAY OF PHOSPHORUS: CPT

## 2018-11-09 PROCEDURE — 85025 COMPLETE CBC W/AUTO DIFF WBC: CPT

## 2018-11-09 PROCEDURE — C9113 INJ PANTOPRAZOLE SODIUM, VIA: HCPCS | Performed by: STUDENT IN AN ORGANIZED HEALTH CARE EDUCATION/TRAINING PROGRAM

## 2018-11-09 PROCEDURE — 63600175 PHARM REV CODE 636 W HCPCS: Performed by: STUDENT IN AN ORGANIZED HEALTH CARE EDUCATION/TRAINING PROGRAM

## 2018-11-09 PROCEDURE — 36415 COLL VENOUS BLD VENIPUNCTURE: CPT

## 2018-11-09 PROCEDURE — 80048 BASIC METABOLIC PNL TOTAL CA: CPT

## 2018-11-09 RX ORDER — OMEPRAZOLE 40 MG/1
40 CAPSULE, DELAYED RELEASE ORAL EVERY MORNING
Qty: 30 CAPSULE | Refills: 2 | Status: SHIPPED | OUTPATIENT
Start: 2018-11-09 | End: 2018-12-31 | Stop reason: DRUGHIGH

## 2018-11-09 RX ORDER — ONDANSETRON 8 MG/1
8 TABLET, ORALLY DISINTEGRATING ORAL EVERY 6 HOURS PRN
Qty: 30 TABLET | Refills: 0 | Status: SHIPPED | OUTPATIENT
Start: 2018-11-09 | End: 2019-06-24

## 2018-11-09 RX ORDER — DIPHENHYDRAMINE HYDROCHLORIDE 50 MG/ML
12.5 INJECTION INTRAMUSCULAR; INTRAVENOUS ONCE
Status: DISCONTINUED | OUTPATIENT
Start: 2018-11-09 | End: 2018-11-09

## 2018-11-09 RX ORDER — HYDROXYZINE HYDROCHLORIDE 25 MG/1
25 TABLET, FILM COATED ORAL 3 TIMES DAILY PRN
Status: DISCONTINUED | OUTPATIENT
Start: 2018-11-09 | End: 2018-11-09 | Stop reason: HOSPADM

## 2018-11-09 RX ADMIN — HYDROXYZINE HYDROCHLORIDE 25 MG: 25 TABLET, FILM COATED ORAL at 06:11

## 2018-11-09 RX ADMIN — ACETAMINOPHEN 1000 MG: 10 INJECTION, SOLUTION INTRAVENOUS at 05:11

## 2018-11-09 RX ADMIN — HYDROXYZINE HYDROCHLORIDE 25 MG: 25 TABLET, FILM COATED ORAL at 02:11

## 2018-11-09 RX ADMIN — HYDROCODONE BITARTRATE AND ACETAMINOPHEN 15 ML: 7.5; 325 SOLUTION ORAL at 10:11

## 2018-11-09 RX ADMIN — ONDANSETRON 4 MG: 2 INJECTION INTRAMUSCULAR; INTRAVENOUS at 10:11

## 2018-11-09 RX ADMIN — Medication: at 01:11

## 2018-11-09 RX ADMIN — POTASSIUM PHOSPHATE, MONOBASIC AND POTASSIUM PHOSPHATE, DIBASIC 30 MMOL: 224; 236 INJECTION, SOLUTION, CONCENTRATE INTRAVENOUS at 08:11

## 2018-11-09 RX ADMIN — ONDANSETRON 4 MG: 2 INJECTION INTRAMUSCULAR; INTRAVENOUS at 04:11

## 2018-11-09 RX ADMIN — PANTOPRAZOLE SODIUM 40 MG: 40 INJECTION, POWDER, FOR SOLUTION INTRAVENOUS at 08:11

## 2018-11-09 RX ADMIN — HYDROCODONE BITARTRATE AND ACETAMINOPHEN 15 ML: 7.5; 325 SOLUTION ORAL at 01:11

## 2018-11-09 NOTE — PLAN OF CARE
11/09/18 1512   Final Note   Assessment Type Final Discharge Note   Anticipated Discharge Disposition Home   What phone number can be called within the next 1-3 days to see how you are doing after discharge? ([phone])   Hospital Follow Up  Appt(s) scheduled? Yes   Discharge plans and expectations educations in teach back method with documentation complete? Yes   Right Care Referral Info   Post Acute Recommendation No Care

## 2018-11-09 NOTE — ANESTHESIA POSTPROCEDURE EVALUATION
"Anesthesia Post Evaluation    Patient: Anat Parks    Procedure(s) Performed: Procedure(s) (LRB):  GASTROENTEROSTOMY, LAPAROSCOPIC (N/A)    Final Anesthesia Type: general  Patient location during evaluation: PACU  Patient participation: Yes- Able to Participate  Level of consciousness: awake and alert  Post-procedure vital signs: reviewed and stable  Pain management: adequate  Airway patency: patent  PONV status at discharge: No PONV  Anesthetic complications: no      Cardiovascular status: blood pressure returned to baseline  Respiratory status: unassisted  Hydration status: euvolemic  Follow-up not needed.        Visit Vitals  /60   Pulse 95   Temp 36.7 °C (98 °F)   Resp 16   Ht 5' 8" (1.727 m)   Wt 95.8 kg (211 lb 3.2 oz)   LMP 11/16/2016 (Exact Date)   SpO2 97%   Breastfeeding? No   BMI 32.11 kg/m²       Pain/Aishwarya Score: Pain Assessment Performed: Yes (11/8/2018 10:00 PM)  Presence of Pain: denies (11/8/2018 10:00 PM)  Pain Rating Prior to Med Admin: 0 (11/9/2018  5:39 AM)  Pain Rating Post Med Admin: 0 (11/8/2018 10:00 PM)  Aishwarya Score: 9 (11/8/2018  3:30 PM)        "

## 2018-11-09 NOTE — PROGRESS NOTES
[facility]  General Surgery  Progress Note    Subjective:     History of Present Illness:  No notes on file    Post-Op Info:  Procedure(s) (LRB):  GASTROENTEROSTOMY, LAPAROSCOPIC (N/A)   1 Day Post-Op     Interval History:     -No acute events overnight. VSS. Afebrile.  -Noting some nausea overnight relieved with zofran.  -Some minor discomfort at laparoscopic sites relieved with PCA. Had itching with pain medication relieved with benadryl.  -Has been up ambulating halls.  -Tolerating water protocol well thus far.    Medications:  Continuous Infusions:   sodium chloride 0.9% 125 mL/hr at 11/08/18 1345     Scheduled Meds:   acetaminophen  1,000 mg Intravenous Q8H    enoxaparin  40 mg Subcutaneous Daily    pantoprazole  40 mg Intravenous BID    potassium phosphate IVPB  30 mmol Intravenous Once     PRN Meds:hydrocodone-apap 7.5-325 MG/15 ML, hydrOXYzine HCl, lorazepam, ondansetron, promethazine (PHENERGAN) IVPB     Review of patient's allergies indicates:  No Known Allergies  Objective:     Vital Signs (Most Recent):  Temp: 98 °F (36.7 °C) (11/09/18 0736)  Pulse: 95 (11/09/18 0736)  Resp: 16 (11/09/18 0736)  BP: 124/60 (11/09/18 0736)  SpO2: 97 % (11/09/18 0736) Vital Signs (24h Range):  Temp:  [97.3 °F (36.3 °C)-99.1 °F (37.3 °C)] 98 °F (36.7 °C)  Pulse:  [] 95  Resp:  [12-24] 16  SpO2:  [91 %-100 %] 97 %  BP: (111-140)/(60-93) 124/60     Weight: 95.8 kg (211 lb 3.2 oz)  Body mass index is 32.11 kg/m².    Intake/Output - Last 3 Shifts       11/07 0700 - 11/08 0659 11/08 0700 - 11/09 0659 11/09 0700 - 11/10 0659    I.V. (mL/kg)  1463 (15.3)     Total Intake(mL/kg)  1463 (15.3)     Net  +1463                  Physical Exam   Constitutional: She is oriented to person, place, and time. She appears well-developed and well-nourished. No distress.   HENT:   Head: Normocephalic and atraumatic.   Eyes: Conjunctivae are normal. Pupils are equal, round, and reactive to light.   Neck: Normal range  of motion. Neck supple.   Cardiovascular: Normal rate and regular rhythm.   Pulmonary/Chest: Effort normal. No respiratory distress.   Abdominal: Soft. She exhibits no distension. There is tenderness (appropriately ttp.).   Incisional dressings c/d/i.   Musculoskeletal: Normal range of motion. She exhibits no edema.   Neurological: She is alert and oriented to person, place, and time.   Skin: Skin is warm and dry.   Psychiatric: She has a normal mood and affect. Her behavior is normal. Judgment normal.       Significant Labs:  CBC:   Recent Labs   Lab 11/09/18  0514   WBC 15.66*   RBC 4.21   HGB 11.5*   HCT 36.2*      MCV 86   MCH 27.3   MCHC 31.8*     CMP:   Recent Labs   Lab 11/09/18  0514   *   CALCIUM 8.5*      K 4.2   CO2 24   *   BUN 8   CREATININE 0.7       Significant Diagnostics:  I have reviewed all pertinent imaging results/findings within the past 24 hours.    Assessment/Plan:     * Morbid obesity    Ms. Anat Parks is a 37 yo female who underwent a laparoscopic Adilson-en-Y gastric bypass and EGD on 11/8/18. She tolerated the procedure well without complication.    Plan:  -Will start CLD this am  -Stop PCA, start hycet for pain control  -prn zofran, phenergan for nausea control  -ambulate, OOB, IS  -If patient tolerates diet, has good nausea control can look for possible discharge later this afternoon             Domonique Dominguez MD  General Surgery  [facility]

## 2018-11-09 NOTE — ASSESSMENT & PLAN NOTE
Ms. Anat Parks is a 37 yo female who underwent a laparoscopic Adilson-en-Y gastric bypass and EGD on 11/8/18. She tolerated the procedure well without complication.    Plan:  -Will start CLD this am  -Stop PCA, start hycet for pain control  -prn zofran, phenergan for nausea control  -ambulate, OOB, IS  -If patient tolerates diet, has good nausea control can look for possible discharge later this afternoon

## 2018-11-09 NOTE — SUBJECTIVE & OBJECTIVE
Interval History:     -No acute events overnight. VSS. Afebrile.  -Noting some nausea overnight relieved with zofran.  -Some minor discomfort at laparoscopic sites relieved with PCA. Had itching with pain medication relieved with benadryl.  -Has been up ambulating halls.  -Tolerating water protocol well thus far.    Medications:  Continuous Infusions:   sodium chloride 0.9% 125 mL/hr at 11/08/18 1345     Scheduled Meds:   acetaminophen  1,000 mg Intravenous Q8H    enoxaparin  40 mg Subcutaneous Daily    pantoprazole  40 mg Intravenous BID    potassium phosphate IVPB  30 mmol Intravenous Once     PRN Meds:hydrocodone-apap 7.5-325 MG/15 ML, hydrOXYzine HCl, lorazepam, ondansetron, promethazine (PHENERGAN) IVPB     Review of patient's allergies indicates:  No Known Allergies  Objective:     Vital Signs (Most Recent):  Temp: 98 °F (36.7 °C) (11/09/18 0736)  Pulse: 95 (11/09/18 0736)  Resp: 16 (11/09/18 0736)  BP: 124/60 (11/09/18 0736)  SpO2: 97 % (11/09/18 0736) Vital Signs (24h Range):  Temp:  [97.3 °F (36.3 °C)-99.1 °F (37.3 °C)] 98 °F (36.7 °C)  Pulse:  [] 95  Resp:  [12-24] 16  SpO2:  [91 %-100 %] 97 %  BP: (111-140)/(60-93) 124/60     Weight: 95.8 kg (211 lb 3.2 oz)  Body mass index is 32.11 kg/m².    Intake/Output - Last 3 Shifts       11/07 0700 - 11/08 0659 11/08 0700 - 11/09 0659 11/09 0700 - 11/10 0659    I.V. (mL/kg)  1463 (15.3)     Total Intake(mL/kg)  1463 (15.3)     Net  +1463                  Physical Exam   Constitutional: She is oriented to person, place, and time. She appears well-developed and well-nourished. No distress.   HENT:   Head: Normocephalic and atraumatic.   Eyes: Conjunctivae are normal. Pupils are equal, round, and reactive to light.   Neck: Normal range of motion. Neck supple.   Cardiovascular: Normal rate and regular rhythm.   Pulmonary/Chest: Effort normal. No respiratory distress.   Abdominal: Soft. She exhibits no distension. There is tenderness (appropriately ttp.).    Incisional dressings c/d/i.   Musculoskeletal: Normal range of motion. She exhibits no edema.   Neurological: She is alert and oriented to person, place, and time.   Skin: Skin is warm and dry.   Psychiatric: She has a normal mood and affect. Her behavior is normal. Judgment normal.       Significant Labs:  CBC:   Recent Labs   Lab 11/09/18  0514   WBC 15.66*   RBC 4.21   HGB 11.5*   HCT 36.2*      MCV 86   MCH 27.3   MCHC 31.8*     CMP:   Recent Labs   Lab 11/09/18  0514   *   CALCIUM 8.5*      K 4.2   CO2 24   *   BUN 8   CREATININE 0.7       Significant Diagnostics:  I have reviewed all pertinent imaging results/findings within the past 24 hours.

## 2018-11-09 NOTE — HOSPITAL COURSE
Following surgery patient was brought to PACU and subsequently to the floor for recovery. She immediately tolerated ambulation and the morning following surgery tolerated the water protocol without difficulty. She noted some nausea relieved with zofran and phenergan. She then tolerated advancement in diet to a full clear liquid diet. She had some problems with itching on the PCA but tolerated pain control with hycet well. As patient was tolerating advancement in diet with control of nausea/vomiting and was able to ambulate on her own she became eligible for discharge.

## 2018-11-09 NOTE — DISCHARGE SUMMARY
[facility]  General Surgery  Discharge Summary      Patient Name: Anat Parks  MRN: [id number]  Admission Date: 11/8/2018  Hospital Length of Stay: 1 days  Discharge Date and Time:  11/09/2018 2:34 PM  Attending Physician: Kale Renee Jr.,*   Discharging Provider: Domonique Dominguez MD  Primary Care Provider: Azalia Muir NP    HPI:   No notes on file    Procedure(s) (LRB):  GASTROENTEROSTOMY, LAPAROSCOPIC (N/A)      Indwelling Lines/Drains at time of discharge:   Lines/Drains/Airways          None        Hospital Course: Following surgery patient was brought to PACU and subsequently to the floor for recovery. She immediately tolerated ambulation and the morning following surgery tolerated the water protocol without difficulty. She noted some nausea relieved with zofran and phenergan. She then tolerated advancement in diet to a full clear liquid diet. She had some problems with itching on the PCA but tolerated pain control with hycet well. As patient was tolerating advancement in diet with control of nausea/vomiting and was able to ambulate on her own she became eligible for discharge.      Pending Diagnostic Studies:     None        Final Active Diagnoses:    Diagnosis Date Noted POA    PRINCIPAL PROBLEM:  Morbid obesity [E66.01] 11/08/2018 Yes    BMI 33.0-33.9,adult [Z68.33] 11/08/2018 Not Applicable      Problems Resolved During this Admission:      Discharged Condition: good    Disposition: To Home or Self Care    Follow Up:  Follow-up Information     Derrek Marie MD On 11/26/2018.    Specialties:  General Surgery, Bariatrics  Why:  Post-op: Appt 11/26/18 at 12:50 PM  Contact information:  [address] [facility] [address]  [phone]                 Patient Instructions:   -Please CRUSH all oral medications/open all capsules for the following two weeks after surgery.       Diet clear liquid     Lifting restrictions   Scheduling Instructions: Please do  not lift anything greater than 10 lbs for the following six weeks after surgery.     No driving until:   Scheduling Instructions: Please do not drive while utilizing narcotic medications for pain control.     Other restrictions (specify):   Scheduling Instructions: Ok to shower 48 hours after surgery. Allow warm, soapy water to wash over incisions. Then rinse off and pat dry. Do not scrub. Do not submerge incisions in standing water (hot tub, bath tub, swimming pool) for at least two weeks after surgery.     Notify your health care provider if you experience any of the following:  temperature >100.4     Notify your health care provider if you experience any of the following:  persistent nausea and vomiting or diarrhea     Notify your health care provider if you experience any of the following:  severe uncontrolled pain     Notify your health care provider if you experience any of the following:  redness, tenderness, or signs of infection (pain, swelling, redness, odor or green/yellow discharge around incision site)     Notify your health care provider if you experience any of the following:  difficulty breathing or increased cough     Remove dressing in 48 hours   Scheduling Instructions: Ok to remove band aids overlying incisions 48 hours after surgery. However, please keep white bandaids directly over incisions on until they come off on own over time-this usually takes about 7-10 days.     Activity as tolerated     Medications:  Reconciled Home Medications:      Medication List      START taking these medications    ondansetron 8 MG Tbdl  Commonly known as:  ZOFRAN-ODT  Dissolve 1 tablet (8 mg total) by mouth every 6 (six) hours as needed.        CHANGE how you take these medications    omeprazole 40 MG capsule  Commonly known as:  PRILOSEC  Take 1 capsule (40 mg total) by mouth every morning. Open capsule and take with apple sauce  What changed:    · when to take this  · additional instructions        CONTINUE  taking these medications    amitriptyline 75 MG tablet  Commonly known as:  ELAVIL  Take 1 tablet (75 mg total) by mouth every evening.     buPROPion 100 MG tablet  Commonly known as:  WELLBUTRIN  Take 1 tablet (100 mg total) by mouth 3 (three) times daily.     CALCIUM 500 + D 500 mg(1,250mg) -200 unit per tablet  Generic drug:  calcium-vitamin D3  Take 1 tablet by mouth 3 (three) times daily with meals.     dicyclomine 20 mg tablet  Commonly known as:  BENTYL  TAKE 1 TABLET FOUR TIMES A DAY BEFORE MEALS AND NIGHTLY     eszopiclone 3 mg Tab  Commonly known as:  LUNESTA  Take 1 tablet (3 mg total) by mouth nightly as needed.     hydrocodone-apap 7.5-325 MG/15 ML oral solution  Commonly known as:  HYCET  Take 15 mLs by mouth 4 (four) times daily as needed for Pain.     lubiprostone 8 MCG Cap  Commonly known as:  AMITIZA  Take 1 capsule (8 mcg total) by mouth 2 (two) times daily with meals.     MULTIVITAMIN 50 PLUS ORAL  Take by mouth 2 (two) times daily.     polyethylene glycol 17 gram/dose powder  Commonly known as:  GLYCOLAX  Mix one capful (17 g) with liquids and drink by mouth once daily.     promethazine 25 MG suppository  Commonly known as:  PHENERGAN  unwrap and place 1 suppository rectally every 6 hours as needed for nausea        STOP taking these medications    ALPRAZolam 0.5 MG tablet  Commonly known as:  XANAX     fluconazole 200 MG Tab  Commonly known as:  DIFLUCAN     topiramate 50 MG tablet  Commonly known as:  TOPAMAX     traMADol 50 mg tablet  Commonly known as:  ULTRAM          Time spent on the discharge of patient: 30 minutes      Domonique Doimnguez MD  General Surgery  [facility]

## 2018-11-09 NOTE — PLAN OF CARE
Patient lives in a 2 story house w/family. Spouse at . Patient is independent & agile. No needs determined.        11/09/18 1115   Discharge Assessment   Assessment Type Discharge Planning Assessment   Confirmed/corrected address and phone number on facesheet? Yes   Assessment information obtained from? Patient;Medical Record   Expected Length of Stay (days) (1)   Communicated expected length of stay with patient/caregiver no  (Per MD)   Prior to hospitilization cognitive status: Alert/Oriented;No Deficits   Prior to hospitalization functional status: Independent   Current cognitive status: Alert/Oriented;No Deficits   Current Functional Status: Independent   Facility Arrived From: (N/A)   Lives With spouse;child(rosy), adult  (1/2 adult kids home on w/e)   Able to Return to Prior Arrangements yes   Is patient able to care for self after discharge? Yes   Who are your caregiver(s) and their phone number(s)? (Pankaj Parks Spouse [phone]   )   Patient's perception of discharge disposition home or selfcare   Readmission Within The Last 30 Days no previous admission in last 30 days   Patient currently being followed by outpatient case management? No   Patient currently receives any other outside agency services? No   Equipment Currently Used at Home none   Do you have any problems affording any of your prescribed medications? No   Is the patient taking medications as prescribed? yes   Does the patient have transportation home? Yes   Transportation Available car;family or friend will provide   Dialysis Name and Scheduled days (N/a)   Does the patient receive services at the [facility]? No   Discharge Plan A Home with family   Discharge Plan B Home with family   Patient/Family In Agreement With Plan yes

## 2018-11-09 NOTE — NURSING
Patient discharged home with medication education, aftercare instructions and follow up appointment provided.  Patient verbalized understanding.

## 2018-11-15 ENCOUNTER — TELEPHONE (OUTPATIENT)
Dept: BARIATRICS | Facility: CLINIC | Age: 38
End: 2018-11-15

## 2018-11-15 NOTE — TELEPHONE ENCOUNTER
----- Message from Sherri Lee sent at 11/15/2018  3:39 PM CST -----  Contact: Pt  Message for MA/RN/Provider     Who called: Pt   Message: Calling to speak with Felecia, didn't specify what it was pertaining to.    Contact preferences: [phone]  Additional Information: N/A

## 2018-11-16 ENCOUNTER — TELEPHONE (OUTPATIENT)
Dept: BARIATRICS | Facility: CLINIC | Age: 38
End: 2018-11-16

## 2018-11-16 NOTE — TELEPHONE ENCOUNTER
"Called to check in 1 week post op from bariatric surgery.    Water protocol began at 7 am and completed while in hospital/ medicine cups given to you by nursing to take home (y/n): y    Dehydration assessment:  Urine output/color: light but sometimes "funky smell"  Chest pain: no  Persistent increased heart rate: no  Fatigue: no  N/V: no vomiting, some nausea  Dizzy/weak: no  BM: "everything going right through me" within 1-1.5 hours after consuming liquids started yesterday  Protein and fluid intake assessment: (food diary)  Fluid intake: powerade, water (16+32) - 48 oz total  Protein supplements: premier shake (2/day)  Protein intake yesterday: 60gm  Vitamins  -What vitamins are you taking? Yes but not taking Bcomplex due to taste  -Are you tolerating well? yes  Medications  Omeprazole: yes  Hycet: yes  How are you tolerating pain at this time? (rate on a scale from 1 to 10; >7 notify PA/MD) "feels like someone is punching me" - every 6-8 hours - can get to a "7"  Are you taking home/other medications as prescribed? yes   Are you having any problems? (f/u with PCP, cardiologist, endocrinologist) no  How is your support system at home? good  Exercise reminder (light exercise at this time, no lifting above 10 lbs) little walking around house     Questions for nurse/MA/PA: needs more pain med and zofran ([id number])     Assessment:  Doing well.     Discussion:  Continue to work on fluid and protein intake.  Recommended only consuming protein shake, powerade zero and water today - if still having diarrhea, switch to protein water or muscle mil or fairlife milk (lactose-free protein items).  Encouraged keeping a food log.  Recommended trying Bariatric advantage or celebrate Bcomplex since they may have more varieties of flavors to choose from     Confirmed date and time for 2 week po labs and clinic visit      "

## 2018-11-16 NOTE — TELEPHONE ENCOUNTER
S/w pt she c/o of taking her pain reliever regularly and having pain @ near her belly button pain ( when she is due for another dose ), pt states she has IBS and could be there.  Per Subhash Goel, pt to change to liquid pain reliever. Sometimes the hycet can have negative effect on pts with IBS. Pt will monitor and keep us informed. If worsens to c/b and go to ER if over the weekend.

## 2018-11-18 ENCOUNTER — PATIENT MESSAGE (OUTPATIENT)
Dept: OBSTETRICS AND GYNECOLOGY | Facility: CLINIC | Age: 38
End: 2018-11-18

## 2018-11-19 ENCOUNTER — PATIENT MESSAGE (OUTPATIENT)
Dept: BARIATRICS | Facility: CLINIC | Age: 38
End: 2018-11-19

## 2018-11-26 ENCOUNTER — OFFICE VISIT (OUTPATIENT)
Dept: BARIATRICS | Facility: CLINIC | Age: 38
End: 2018-11-26
Payer: OTHER GOVERNMENT

## 2018-11-26 ENCOUNTER — LAB VISIT (OUTPATIENT)
Dept: LAB | Facility: HOSPITAL | Age: 38
End: 2018-11-26
Attending: SURGERY
Payer: OTHER GOVERNMENT

## 2018-11-26 ENCOUNTER — CLINICAL SUPPORT (OUTPATIENT)
Dept: BARIATRICS | Facility: CLINIC | Age: 38
End: 2018-11-26
Payer: OTHER GOVERNMENT

## 2018-11-26 VITALS
HEIGHT: 68 IN | HEART RATE: 82 BPM | BODY MASS INDEX: 30.34 KG/M2 | SYSTOLIC BLOOD PRESSURE: 112 MMHG | DIASTOLIC BLOOD PRESSURE: 65 MMHG | WEIGHT: 200.19 LBS

## 2018-11-26 DIAGNOSIS — Z98.84 STATUS POST GASTRIC BYPASS FOR OBESITY: ICD-10-CM

## 2018-11-26 DIAGNOSIS — E66.9 OBESITY (BMI 30.0-34.9): Primary | ICD-10-CM

## 2018-11-26 DIAGNOSIS — R10.13 DYSPEPSIA: ICD-10-CM

## 2018-11-26 DIAGNOSIS — K21.9 GASTROESOPHAGEAL REFLUX DISEASE, ESOPHAGITIS PRESENCE NOT SPECIFIED: ICD-10-CM

## 2018-11-26 DIAGNOSIS — E66.01 MORBID OBESITY: ICD-10-CM

## 2018-11-26 DIAGNOSIS — K76.0 FATTY LIVER: ICD-10-CM

## 2018-11-26 LAB
ALBUMIN SERPL BCP-MCNC: 3.8 G/DL
ALP SERPL-CCNC: 59 U/L
ALT SERPL W/O P-5'-P-CCNC: 28 U/L
ANION GAP SERPL CALC-SCNC: 7 MMOL/L
AST SERPL-CCNC: 17 U/L
BASOPHILS # BLD AUTO: 0.03 K/UL
BASOPHILS NFR BLD: 0.5 %
BILIRUB SERPL-MCNC: 0.4 MG/DL
BUN SERPL-MCNC: 12 MG/DL
CALCIUM SERPL-MCNC: 9.4 MG/DL
CHLORIDE SERPL-SCNC: 107 MMOL/L
CO2 SERPL-SCNC: 25 MMOL/L
CREAT SERPL-MCNC: 0.8 MG/DL
DIFFERENTIAL METHOD: ABNORMAL
EOSINOPHIL # BLD AUTO: 0.1 K/UL
EOSINOPHIL NFR BLD: 1.5 %
ERYTHROCYTE [DISTWIDTH] IN BLOOD BY AUTOMATED COUNT: 14 %
EST. GFR  (AFRICAN AMERICAN): >60 ML/MIN/1.73 M^2
EST. GFR  (NON AFRICAN AMERICAN): >60 ML/MIN/1.73 M^2
GLUCOSE SERPL-MCNC: 85 MG/DL
HCT VFR BLD AUTO: 40.4 %
HGB BLD-MCNC: 12.4 G/DL
IMM GRANULOCYTES # BLD AUTO: 0.04 K/UL
IMM GRANULOCYTES NFR BLD AUTO: 0.7 %
LYMPHOCYTES # BLD AUTO: 1.5 K/UL
LYMPHOCYTES NFR BLD: 26 %
MCH RBC QN AUTO: 26.8 PG
MCHC RBC AUTO-ENTMCNC: 30.7 G/DL
MCV RBC AUTO: 87 FL
MONOCYTES # BLD AUTO: 0.4 K/UL
MONOCYTES NFR BLD: 7 %
NEUTROPHILS # BLD AUTO: 3.8 K/UL
NEUTROPHILS NFR BLD: 64.3 %
NRBC BLD-RTO: 0 /100 WBC
PLATELET # BLD AUTO: 297 K/UL
PMV BLD AUTO: 11.5 FL
POTASSIUM SERPL-SCNC: 4.2 MMOL/L
PROT SERPL-MCNC: 7 G/DL
RBC # BLD AUTO: 4.63 M/UL
SODIUM SERPL-SCNC: 139 MMOL/L
VIT B12 SERPL-MCNC: 1146 PG/ML
WBC # BLD AUTO: 5.84 K/UL

## 2018-11-26 PROCEDURE — 99999 PR PBB SHADOW E&M-EST. PATIENT-LVL IV: CPT | Mod: PBBFAC,,, | Performed by: PHYSICIAN ASSISTANT

## 2018-11-26 PROCEDURE — 82607 VITAMIN B-12: CPT

## 2018-11-26 PROCEDURE — 36415 COLL VENOUS BLD VENIPUNCTURE: CPT

## 2018-11-26 PROCEDURE — 99214 OFFICE O/P EST MOD 30 MIN: CPT | Mod: PBBFAC | Performed by: PHYSICIAN ASSISTANT

## 2018-11-26 PROCEDURE — 99024 POSTOP FOLLOW-UP VISIT: CPT | Mod: ,,, | Performed by: PHYSICIAN ASSISTANT

## 2018-11-26 PROCEDURE — 99499 UNLISTED E&M SERVICE: CPT | Mod: S$PBB,,, | Performed by: DIETITIAN, REGISTERED

## 2018-11-26 PROCEDURE — 84425 ASSAY OF VITAMIN B-1: CPT

## 2018-11-26 PROCEDURE — 85025 COMPLETE CBC W/AUTO DIFF WBC: CPT

## 2018-11-26 PROCEDURE — 80053 COMPREHEN METABOLIC PANEL: CPT

## 2018-11-26 NOTE — PATIENT INSTRUCTIONS
High Protein Pureed Diet    2 weeks after gastric bypass and sleeve you may be ready to add pureed food to your diet.  All food should be the consistency of baby food, or thinner.  Follow pureed diet for the next 2 weeks.    Protein - It is very important to pay attention to protein intake during this time.      Inadequate protein intake can cause:  ? Delayed Wound Healing  ? Hair Loss  ? Muscle Breakdown    Meal Plan - Eat 3-4 meals per day (2-4 tbsp each), with protein supplements in between to meet protein needs.  Meeting protein needs daily will help increase healing, decrease muscle loss, and increase weight loss.  Your goal is  grams of protein a day.    Protein First - Always eat the foods with the highest protein first.  Foods high in protein include milk, yogurt, cheese, egg whites, and blenderized meat, seafood, and beans.    Fluids - Keep track in your journal of how much you are drinking; you should try to drink at least 64oz of fluids every day.      Foods allowed: Portion size Protein (g)   ? Sugar-free clear liquids As desired 0   ? Skim or 1% milk ½ cup 4   ? Sugar free pudding, light yogurt, custard (use skim or 1% milk in preparation) 3 oz 2.5   ? Strained baby food meats, or home-made pureed lean meats and shrimp 1 oz 7   ? Beans (red, white, black, lima, alcocer, fat free refried, hummus) and lentils ¼ cup 4   ? Low-fat/fat free cheese.(cottage cheese, mozzarella string cheese, ricotta cheese, Laughing Cow, Baby Bell, cheddar, etc) ¼ cup 7-8   ? Scrambled eggs or Egg Beaters 1 or ¼ cup 6   ? Edamame or Tofu, mashed ¼ cup 5   ? Unflavored protein powder (add to 1 scoop to  98% fat free soups or SF pudding) 3 Tbsp 9   ? *PB2: peanut powder (45 calories) 2 Tbsp 5     *PB2 powdered peanut butter: 45 calories vs. 190 calories in 2 tbsp of regular peanut butter. Purchase online at [employer], or  at various [employer], [employer], [employer], [employer] and [employer].      Bariatric Liquid/Pureed Sample  Menu    3-4 small meals plus 2-3 protein drinks per day.    8am 1 egg or ¼ cup Egg Beaters   9am 1 cup water, or decaf coffee or tea   10am Protein drink, 30g protein   11am 2 tbsp low-fat cottage cheese, and 1 tbsp pureed peaches   12pm 1 cup water, or sugar-free lemonade    1pm 2 tbsp pureed chicken, and 1 tbsp pureed carrots    2pm 1 cup water, or sugar-free lemonade   3pm Protein drink, 30g protein   5pm 1 cup water    6pm 1 cup hi-protein creamy chicken soup 14g protein (see Recipe below)   7pm 1 cup water, or sugar-free fruit punch    8pm 1 cup water     This sample menu provides approx. 80g protein and 64oz fluids.  Liquid protein supplements should contain 20-30g protein and less than 4 grams of sugar each.    ? Sip fluids continuously in between meals.  Drink at least ¼ cup every 15 minutes.  ? For fluids: ¼ cup = 2 oz = 4 tbsp       RECIPE IDEAS for Bariatric Pureed Diet:    Hi-Protein Creamy Chicken Soup: (10g protein per 1 cup serving)  Empty 1 can of 98% fat free cream of chicken soup into saucepan. Then  blend 1 scoop of unflavored protein powder with 1 can of skim milk until smooth.  Add protein milk to saucepan and heat to warm. (Note: Do NOT boil. Protein powder may clump if heated too hot).     Hi-Protein Pudding: (14g protein per ½ cup serving)  Add 2 scoops protein powder to 2 cups cold skim milk and mix well.  Stir in dry Jell-O Sugar-Free Instant Pudding mix.  Chill and Enjoy!    Tuna Mousse (12g protein per ¼ cup serving) Page 135 in book Eating Well After Weight Loss Surgery.  In a  or , combine all ingredients and pulse until smooth.  2 6-ounce cans tuna packed in water, drained  2 tbsp low-fat mayonnaise  2 tbsp fat-free sour cream  2 tbsp fat-free cream cheese, softened  ½ cup shallots, finely chopped  1 tbsp lemon juice  ¼ tsp ground pepper  ½ tsp celery seed    Chocolate Peanut Butter Mousse  (28g protein total)  6oz plain Greek yogurt  4 tbsp chocolate PB2

## 2018-11-26 NOTE — PROGRESS NOTES
BARIATRIC POST-OPERATIVE VISIT:    HPI:  Anat Parks is a 38 y.o. year old female presents for 2 week post op visit following s/p gastric sleeve.  she is doing well and tolerating the diet without difficulty.  she has no complaints.    Vomited once after drinking to quickly. None since then.     Denies: nausea, vomiting, abdominal pain, changes in bowel movement pattern, fever, chills, dysphagia, chest pain, and shortness of breath.    Review of Systems   Constitutional: Negative for activity change, appetite change, chills and fever.   HENT: Negative for tinnitus.    Respiratory: Negative for cough, choking and shortness of breath.    Cardiovascular: Negative for chest pain and leg swelling.   Gastrointestinal: Positive for abdominal pain (WHSS), diarrhea (once at home, resolved) and vomiting (once resolved). Negative for constipation and nausea.   Genitourinary: Negative for dysuria.   Musculoskeletal: Negative for gait problem and myalgias.   Skin: Negative for rash.   Neurological: Positive for dizziness (mildness, stopped after drinking a protein drink ). Negative for headaches.       EXERCISE & VITAMINS:  See Bariatric Assessment   Adherent to bariatric vitamin regimen     MEDICATIONS/ALLERGIES:  Have been reviewed.      DIET: Liquid Bariatric Diet.  2 (premier protein)protein shakes daily, ~ 60  grams protein. 58+  fl oz SF clear beverage Vitamin water Protein H20 and Powderade zero, decaf coffee with some protein shake for creamer See Dietician note from today for a more detailed assessment.      Mild cream of wheat for dinner (2 ounces 2%percent milk) last night went down fine.    Physical Exam   Constitutional: She is oriented to person, place, and time. She appears well-developed and well-nourished.   HENT:   Head: Normocephalic and atraumatic.   Eyes: Conjunctivae and EOM are normal. Pupils are equal, round, and reactive to light.   Neck: Normal range of motion. Neck supple. No JVD present. No  thyromegaly present.   Cardiovascular: Normal rate, regular rhythm, normal heart sounds and intact distal pulses.   No murmur heard.  Pulmonary/Chest: Effort normal and breath sounds normal. No respiratory distress.   Abdominal: Soft. Bowel sounds are normal. She exhibits no mass. There is tenderness (WHSS). There is no rebound.   Musculoskeletal: Normal range of motion. She exhibits no edema.   Neurological: She is alert and oriented to person, place, and time.   Skin: Skin is warm and dry. Capillary refill takes less than 2 seconds. No rash noted. No erythema.   Psychiatric: She has a normal mood and affect. Her behavior is normal. Judgment and thought content normal.       ASSESSMENT:  - Morbid obesity s/p sleeve gastrectomy on 11/08/18.  - Co-morbidities: GERD and IBS, dyspepsia, fatty liver   - Great Weight loss, 17# and 28% EWL  - Good Exercise routine  - Good Diet  - Good Vitamin regimen    PLAN:  - Anti-Acid medication, PPI daily for 3 months  - No lifting more than 10 lbs for 6 weeks  - Miralax daily for constipation  - Emphasized the importance of regular exercise and adherence to bariatric diet to achieve maximum weight loss.  - Encouraged patient to start/continue regular exercise.  - Follow-up with dietician to advance diet.  - Continue daily vitamins and medications.  - RTC in 2 weeks or sooner if needed.  - Call the office for any issues.  - Check labs today.

## 2018-11-26 NOTE — PROGRESS NOTES
NUTRITION NOTE    Referring Physician: Kale Renee M.D.  Reason for MNT Referral: Follow-up 2 Weeks s/p Gastric Bypass    PAST MEDICAL HISTORY:  Denies nausea, vomiting and diarrhea. Complains of constipation - hard to go - will start miralax.  Reports doing well.    Past Medical History:   Diagnosis Date    Abdominal pain, other specified site     Right lower abdomen    Acid reflux     Anxiety     Bilateral ovarian cysts     Mostly on right, but left also    Bulging lumbar disc     DDD (degenerative disc disease), lumbar     Degenerative disc disease Nov 2017    Lower back    Depression     Fatigue     Fatty liver     Fibroid tumor     Right ovary-sm    Heartburn     IBS (irritable bowel syndrome)     Menorrhagia     Ulcer 2014    Had an upper gi take Prilosec       CLINICAL DATA:  38 y.o. female.    Current Weight: 200 lbs  BMI: 30.44    LABS:  Reviewed. no vit B1 and B12 available at this time.    CURRENT DIET:  Bariatric Liquid Diet    Diet Recall: ~60-80 grams of protein/day; 56oz of fluids/day (vitamin water, powerade zero, water)    Diet Includes: broth 8oz  Protein Supplements: protein 2.0 x  1/day, premier protein x 2/day    EXERCISE:  Adequate light exercise.    Restrictions to Exercise: None.    VITAMINS/MINERALS:  Multivitamins: x2/day  B-Complex: 1/day  Calcium Citrate + Vitamin D: 3/day (calcium carbonate)  Vitamin B12: Sublingual.    ASSESSMENT:  Doing well overall.  Inadequate protein intake.  Inadequate fluid intake.    BARIATRIC DIET DISCUSSION:  Instructed and provided written materials on bariatric pureed diet plan.  Reinforced post-op nutrition guidelines.    PLAN/RECOMMONDATIONS:  Advance to bariatric pureed diet.  Increase protein intake.  Increase fluid intake.  Increase light exercise.  Continue appropriate vitamins & minerals.  Adjust vitamins & minerals by: switch to calcium citrate    Return to clinic in 2 weeks.    SESSION TIME: 25 minutes

## 2018-11-28 LAB — VIT B1 BLD-MCNC: 95 UG/L (ref 38–122)

## 2018-11-29 RX ORDER — ALPRAZOLAM 0.5 MG/1
0.5 TABLET ORAL 2 TIMES DAILY PRN
Qty: 60 TABLET | Refills: 2 | Status: CANCELLED | OUTPATIENT
Start: 2018-11-29 | End: 2018-12-29

## 2018-11-30 NOTE — TELEPHONE ENCOUNTER
Patient phoned clinic stating when she had her surgery or during discharge, someone discontinued her Xanax 0.5mg. When I called the pharmacy, they stated they will need a new prescription due to this.

## 2018-12-03 RX ORDER — ALPRAZOLAM 0.5 MG/1
0.5 TABLET ORAL 2 TIMES DAILY PRN
Qty: 60 TABLET | Refills: 2 | Status: SHIPPED | OUTPATIENT
Start: 2018-12-03 | End: 2019-01-07 | Stop reason: SDUPTHER

## 2018-12-05 ENCOUNTER — PATIENT MESSAGE (OUTPATIENT)
Dept: INTERNAL MEDICINE | Facility: CLINIC | Age: 38
End: 2018-12-05

## 2018-12-05 RX ORDER — TRAMADOL HYDROCHLORIDE 50 MG/1
50 TABLET ORAL EVERY 12 HOURS PRN
Qty: 20 TABLET | Refills: 0 | Status: SHIPPED | OUTPATIENT
Start: 2018-12-05 | End: 2019-03-12 | Stop reason: SDUPTHER

## 2018-12-11 ENCOUNTER — OFFICE VISIT (OUTPATIENT)
Dept: BARIATRICS | Facility: CLINIC | Age: 38
End: 2018-12-11
Payer: OTHER GOVERNMENT

## 2018-12-11 ENCOUNTER — CLINICAL SUPPORT (OUTPATIENT)
Dept: BARIATRICS | Facility: CLINIC | Age: 38
End: 2018-12-11
Payer: OTHER GOVERNMENT

## 2018-12-11 VITALS
WEIGHT: 198 LBS | HEART RATE: 94 BPM | HEIGHT: 68 IN | SYSTOLIC BLOOD PRESSURE: 116 MMHG | DIASTOLIC BLOOD PRESSURE: 64 MMHG | BODY MASS INDEX: 30.01 KG/M2

## 2018-12-11 DIAGNOSIS — R63.4 WEIGHT LOSS: ICD-10-CM

## 2018-12-11 DIAGNOSIS — K21.9 GASTROESOPHAGEAL REFLUX DISEASE, ESOPHAGITIS PRESENCE NOT SPECIFIED: ICD-10-CM

## 2018-12-11 DIAGNOSIS — Z98.890 POSTOPERATIVE STATE: Primary | ICD-10-CM

## 2018-12-11 DIAGNOSIS — R10.13 DYSPEPSIA: ICD-10-CM

## 2018-12-11 DIAGNOSIS — K58.2 IRRITABLE BOWEL SYNDROME WITH BOTH CONSTIPATION AND DIARRHEA: ICD-10-CM

## 2018-12-11 PROCEDURE — 99024 POSTOP FOLLOW-UP VISIT: CPT | Mod: ,,, | Performed by: PHYSICIAN ASSISTANT

## 2018-12-11 PROCEDURE — 99499 UNLISTED E&M SERVICE: CPT | Mod: S$PBB,,, | Performed by: DIETITIAN, REGISTERED

## 2018-12-11 PROCEDURE — 99999 PR PBB SHADOW E&M-EST. PATIENT-LVL IV: CPT | Mod: PBBFAC,,, | Performed by: PHYSICIAN ASSISTANT

## 2018-12-11 PROCEDURE — 99214 OFFICE O/P EST MOD 30 MIN: CPT | Mod: PBBFAC | Performed by: PHYSICIAN ASSISTANT

## 2018-12-11 NOTE — PROGRESS NOTES
NUTRITION NOTE    Referring Physician: Kale Renee M.D.  Reason for MNT Referral: Follow-up 4 Weeks s/p Gastric Sleeve    PAST MEDICAL HISTORY:  Denies vomiting, constipation and diarrhea. Pt with some nausea after eating - mostly due to eating too quickly.  Reports doing well.    Past Medical History:   Diagnosis Date    Abdominal pain, other specified site     Right lower abdomen    Acid reflux     Anxiety     Bilateral ovarian cysts     Mostly on right, but left also    Bulging lumbar disc     DDD (degenerative disc disease), lumbar     Degenerative disc disease Nov 2017    Lower back    Depression     Fatigue     Fatty liver     Fibroid tumor     Right ovary-sm    Heartburn     IBS (irritable bowel syndrome)     Menorrhagia     Ulcer 2014    Had an upper gi take Prilosec       CLINICAL DATA:  38 y.o. female.    Current Weight: 197 lbs  BMI: 30.1    LABS:  Reviewed.from last visit    CURRENT DIET:  Bariatric Pureed Diet    Diet Recall: 104-112 grams of protein/day; 56oz of fluids/day (vitamin water, powerade zero, water)    Breakfast: egg or protein shake  Lunch: steak/chicken (2 oz) or beans  Snack: cheese stick or 2 baby bell cheese  Dinner: roast, broccoli  Snack: protein shake    Meal Pattern: 2-3 meal(s) + 1 snack(s) + 2 protein supplement(s)  Adequate protein supplement intake.    EXERCISE:  Adequate light exercise. House work and walking dogs    Restrictions to Exercise: None.    VITAMINS/MINERALS:  Multivitamins: FC x 2/day  B-Complex: 1/day - Bariatric advantage  Calcium Citrate + Vitamin D: tablets x 3/day  Vitamin B12: in with Bcomplex  Hair and Nails daily    ASSESSMENT:  Doing well overall.  Adequate protein intake.  Inadequate fluid intake.  Advancing diet appropriately.  Exercising.  Adequate vitamins & minerals.    BARIATRIC DIET DISCUSSION:  Instructed and provided written materials on bariatric soft diet plan.  Reinforced post-op nutrition guidelines.    PLAN /  RECOMMENDATIONS:  Advance to bariatric soft diet.  Maintain protein intake.  Increase fluid intake.  Continue light exercise.  Continue appropriate vitamins & minerals.    Return to clinic in 2 months.    SESSION TIME: 15 minutes

## 2018-12-11 NOTE — PATIENT INSTRUCTIONS
Bariatric Soft Diet           - Start Soft Diet 2 weeks after gastric banding  -   Start Soft Diet 4 weeks after gastric bypass and sleeve    As your stomach heals, your doctor will progress your diet to soft foods.  This diet usually lasts for 2-3 months, but can last longer depending on each individual. Soft foods are those which can be easily mashed with a fork.    Remember these principles:   No liquids with meals. Do no drink 30 minutes before meals and wait 30 minutes to 1 hour after meals to start drinking.   Sip on water, sugar-free beverages or non-fat milk throughout the day.  You will need to continue drinking at least 1 protein drink daily to meet protein needs.   100% fruit juice (no sugar added) is allowed, but limit to 4oz a day because it is high in calories and does not contain any protein.   Chew foods slowly; one meal should take 20-30 minutes.   Eat 3-5 meals per day, without any additional snacking.   Stop eating as soon as you feel full.   Avoid using table sugar and foods made with refined sugar, which can trigger dumping syndrome.   Marinating meats with a low sugar marinade, adding low-fat salad dressing, or adding low calorie gravy (made from powder and water) can help meats to digest easier.     Adding Vegetables and Fruits:    As long as you are consuming >80g total protein daily from combination of foods and protein drinks, you may start adding small bites of fruits and vegetables to your meals. Cooked, tender vegetables and ripe fruits without the peel are tolerated best.    Avoid fruit canned in syrup, sugary fruit juices, and vegetables cooked with oil, butter or sandoval.  Bariatric SOFT Diet    EAT THESE FOODS AVOID THESE FOODS   High in Protein: High in Fat/Sugar:   ? Canned tuna or chicken (packed in water)  ? Lean ground turkey breast or ground round  ? Turkey or chicken (no skin); cooked tender and cut in small pieces  ? Lean pork or beef (cook in crock pot until very  tender; cut in small pieces  ? Scrambled, poached, or boiled eggs  ? Baked, broiled, grilled or boiled fish and seafood (not fried!)  ? Silken tofu, Edamame (soybeans)  ? Beans, hummus and lentils  ? Lean deli meats (turkey and chicken breast, ham, roast beef)  ? 1% or Skim Milk, Lactaid, or Soymilk  ? Low-fat or fat-free cottage cheese, soft cheese, mozzarella string cheese, or ricotta  ? Light yogurt, Greek yogurt, SF pudding High fat milk (whole, 2%)  Butter, margarine, oil, mayonnaise  Sour cream, cream cheese, salad dressing  Ice Cream  Cakes, cookies, pies, desserts  Candy  Luncheon meats (bologna, salami, chopped ham)  Sausage, Reddy  Gravy  Fried Foods  ___________________________________  Tough/Crunchy--------------------------------  Tough or dry meats  Corn   Granola/cereal with nuts  Shredded Coconut    May add after 3 months:  Raw veggies  Lettuce  Plain, Unsalted Nuts and Seeds  Protein bars with 0-4 grams of sugar   As long as you are getting >80g PRO: Starchy Carbohydrates. At goal weight, some may include whole grains in small amounts.   Cooked tender vegetables without peel  Ripe fruits without peel  Frozen fruits with no added sugar  Fruit canned in its own juice or in water  Fat free, sugar free, frozen yogurt White and wheat Bread, Rice, Pasta   Cereals (including grits, oatmeal)   Crackers, Pretzels, Chips, Granola  Corn, Popcorn, Peas  White Potatoes, Sweet potatoes  Flour and corn tortillas     Fluids: Always Avoid:   Skim/1% milk, Lactaid, Soymilk  Water and Sugar-free beverages  (decaf and non-carbonated)  Decaf coffee & decaf tea  Sugary drinks  Carbonated drinks  Alcohol  Drinking through straws     Protein Content of Foods Recommended after                   Weight Loss Surgery    Food Name Portion Calories Protein (gms)   Almonds (unsalted) 1/4 cup 160 6   Moro milk, unsweetened 1 cup 30  1   Beef, Roast 1 oz 46 8   Beef, Steak, sirloin, trimmed 1 oz 55 9   Catfish, broiled or baked 1  oz 30 5   Cheese, American FF 1 oz 40 6   Cheese, Cottage 1% fat ¼ cup 41 7   Cheese, Parmesan, grated ¼ cup 128 12   Cheese, Mozzarella, part skim 1 oz 78 8   Cheese, part skim Ricotta ¼ cup 90 8   Chicken, white breast w/o skin 1 oz 46 9   Chicken, leg w/o skin 1 oz 54 7   Crab, steamed ¼ cup  40 9   Crawfish tails, boiled ¼ cup 35 8   Edamame, shelled ¼ cup 50 4   Egg 1 78 6   Ham, lean 5% 1 oz 44 7   Hamburger, lean 1 oz 56 7   Hummus ¼ cup 100 5   Lobster, steamed 1 oz 26 5   Milk, skim or 1%, soy  1 cup 90 8   Pork Tenderloin 1 oz 46 7   Pudding, SF 1 serv 60 2   Red beans ¼ cup 56 4   Refried beans, fat free ¼ cup 65 4   Acra, baked 1 oz 52 7   Shrimp, steamed 1 oz 28 6   Soymilk, plain ½ cup 40 3   Tilapia, white fish, cooked 1 oz 36 8   Tofu ¼ cup 47 5   Trout 1 oz 48 7   Tuna, canned in water 1 oz 37 8   Turkey, white meat 1 oz 35 7   Veal Loin 1 oz 50 7   Yogurt, SF, frozen vanilla 3 oz 72 3.5   Yogurt, Fruit, FF, light 3 oz 40 2.5   Yogurt, Greek 3 oz 70 8     *Abbreviations: SF=sugar free, LF=low fat, FF= fat free, gms=grams  *3oz of cooked meat/protein = size of deck of cards or ladies palm   *1oz cheese = 1inch cube or 1 slice American cheese    Sample Menu for Bariatric Soft Diet  For Gastric Bypass and Sleeve            3 meals + 2 protein drinks  Remember: No drinking with meals.    Time of Day Day 1 Day 2   7am:    1 egg (or ¼ cup Egg Beaters) ¼ cup low-fat cottage cheese, 1 tbsp berries   8am: 1 cup water/SF beverage     9am: 1 cup water/SF beverage     10am:  Protein drink  Protein drink   11am: 1 cup water/SF beverage     12pm:    1-2 oz grilled shrimp, ¼ cup green beans   1-2oz canned chicken, shredded cheese, 1 tbsp salsa   1pm: 1 cup water/SF beverage     3pm:  Protein drink   Protein drink   4pm: 1 cup water/SF beverage     6pm:  ½ cup low fat chili, 1oz low-fat cheese, ¼ cup broccoli 2 oz grilled fish,  ¼  cup lima beans   7pm: 1 cup water/SF beverage       This sample menu provides  approx. 80g protein total, including about 40g protein from foods and at least 40g protein from protein drinks.  Drinking protein drinks daily helps decrease muscle loss, increase weight loss, and prevent hair loss.    ? Sip fluids continuously in between meals.    ? For fluids: 1 cup = 8 oz   ? For food: ¼ cup = 4 tablespoons = 1oz  ? No drinking from 30 minutes before meals to 30 minutes after meals.  ? 3oz meat is approx. the size of a deck of cards.    ? A food scale will help you determine portion size (Can be purchased at [employer])

## 2018-12-11 NOTE — PROGRESS NOTES
BARIATRIC POST-OPERATIVE VISIT:    HPI:  Anat Parks is a 38 y.o. year old female presents for 2 week post op visit following s/p gastric sleeve.  she is doing well and tolerating the diet without difficulty.      States that she is having heartburn, depends on what she is eating and how fast she is eating , no feeling of food getting stuck, chest pain or shortness of breath no active vomiting or nausea.    Had severe constipation used the miralax and suppositories it has resolved.       Denies: nausea, vomiting, abdominal pain, fever, chills, dysphagia, chest pain, and shortness of breath.    Review of Systems   Constitutional: Negative for activity change, appetite change, chills and fever.   HENT: Negative for tinnitus.    Respiratory: Negative for cough, choking and shortness of breath.    Cardiovascular: Negative for chest pain and leg swelling.   Gastrointestinal: Positive for constipation. Negative for abdominal pain (WHSS), diarrhea, nausea and vomiting (once resolved).   Genitourinary: Negative for dysuria and hematuria.   Musculoskeletal: Negative for gait problem and myalgias.   Skin: Negative for rash.   Neurological: Negative for dizziness (mild) and headaches.       EXERCISE & VITAMINS:  See Bariatric Assessment   Adherent to bariatric vitamin regimen (switched to bariatric advantage for B12 and calcium citrate)     MEDICATIONS/ALLERGIES:  Have been reviewed.      DIET: Puree Bariatric Diet.  2 (premier protein)protein shakes daily, ~ 80+  grams protein. 58+  fl oz SF clear beverage Vitamin water Protein H20 and Powderade zero, decaf coffee with some protein shake for creamer     Peanut butter protein  Chicken   Black eyed peas   Refried beans   Pudding   Soup (cheese and broccoli with unflavored protein powder)  2 shakes a day     Pureed pork chops   Apple sauce with meds   Eggs  Popsicle (sugar free chocolate)       See Dietician note from today for a more detailed assessment.      Physical  Exam   Constitutional: She is oriented to person, place, and time. She appears well-developed and well-nourished.   HENT:   Head: Normocephalic and atraumatic.   Eyes: Conjunctivae and EOM are normal. Pupils are equal, round, and reactive to light.   Neck: Normal range of motion. Neck supple. No JVD present. No thyromegaly present.   Cardiovascular: Normal rate, regular rhythm, normal heart sounds and intact distal pulses.   No murmur heard.  Pulmonary/Chest: Effort normal and breath sounds normal. No respiratory distress.   Abdominal: Soft. Bowel sounds are normal. She exhibits no mass. There is tenderness (WHSS). There is no rebound.   Musculoskeletal: Normal range of motion. She exhibits no edema.   Neurological: She is alert and oriented to person, place, and time.   Skin: Skin is warm and dry. Capillary refill takes less than 2 seconds. No rash noted. No erythema.   Psychiatric: She has a normal mood and affect. Her behavior is normal. Judgment and thought content normal.       ASSESSMENT:  - Morbid obesity s/p sleeve gastrectomy on 11/08/18.  - Co-morbidities: GERD and IBS, dyspepsia, fatty liver   - Good Weight loss, 19.85# and 33% EWL  - Good Exercise routine  - Good Diet  - Good Vitamin regimen    PLAN:  - Anti-Acid medication, PPI daily for 3 months  - No lifting more than 10 lbs for 2 weeks  - Miralax daily for constipation  - Emphasized the importance of regular exercise and adherence to bariatric diet to achieve maximum weight loss.  - Encouraged patient to start/continue regular exercise.  - Follow-up with dietician to advance diet.  - Continue daily vitamins and medications.  - RTC in 2 months or sooner if needed.  - Call the office for any issues.  - Check labs today.

## 2018-12-18 ENCOUNTER — OFFICE VISIT (OUTPATIENT)
Dept: OBSTETRICS AND GYNECOLOGY | Facility: CLINIC | Age: 38
End: 2018-12-18
Payer: OTHER GOVERNMENT

## 2018-12-18 VITALS
WEIGHT: 195 LBS | RESPIRATION RATE: 16 BRPM | BODY MASS INDEX: 29.55 KG/M2 | HEART RATE: 53 BPM | HEIGHT: 68 IN | DIASTOLIC BLOOD PRESSURE: 72 MMHG | SYSTOLIC BLOOD PRESSURE: 106 MMHG

## 2018-12-18 DIAGNOSIS — N90.89 VULVAR IRRITATION: ICD-10-CM

## 2018-12-18 DIAGNOSIS — Z12.31 ENCOUNTER FOR SCREENING MAMMOGRAM FOR BREAST CANCER: ICD-10-CM

## 2018-12-18 DIAGNOSIS — R39.15 URINARY URGENCY: ICD-10-CM

## 2018-12-18 DIAGNOSIS — R39.9 UTI SYMPTOMS: Primary | ICD-10-CM

## 2018-12-18 DIAGNOSIS — R30.0 DYSURIA: ICD-10-CM

## 2018-12-18 DIAGNOSIS — Z80.3 FAMILY HISTORY OF BREAST CANCER: ICD-10-CM

## 2018-12-18 LAB
BILIRUB SERPL-MCNC: NEGATIVE MG/DL
BLOOD URINE, POC: NEGATIVE
COLOR, POC UA: YELLOW
GLUCOSE UR QL STRIP: NORMAL
KETONES UR QL STRIP: NEGATIVE
LEUKOCYTE ESTERASE URINE, POC: 1
NITRITE, POC UA: NEGATIVE
PH, POC UA: 5
PROTEIN, POC: NEGATIVE
SPECIFIC GRAVITY, POC UA: 1.01
UROBILINOGEN, POC UA: NORMAL

## 2018-12-18 PROCEDURE — 87086 URINE CULTURE/COLONY COUNT: CPT

## 2018-12-18 PROCEDURE — 87660 TRICHOMONAS VAGIN DIR PROBE: CPT

## 2018-12-18 PROCEDURE — 99214 OFFICE O/P EST MOD 30 MIN: CPT | Mod: S$PBB,,, | Performed by: OBSTETRICS & GYNECOLOGY

## 2018-12-18 PROCEDURE — 87088 URINE BACTERIA CULTURE: CPT

## 2018-12-18 PROCEDURE — 99213 OFFICE O/P EST LOW 20 MIN: CPT | Mod: PBBFAC | Performed by: OBSTETRICS & GYNECOLOGY

## 2018-12-18 PROCEDURE — 87077 CULTURE AEROBIC IDENTIFY: CPT

## 2018-12-18 PROCEDURE — 96372 THER/PROPH/DIAG INJ SC/IM: CPT | Mod: PBBFAC

## 2018-12-18 PROCEDURE — 81002 URINALYSIS NONAUTO W/O SCOPE: CPT | Mod: PBBFAC | Performed by: OBSTETRICS & GYNECOLOGY

## 2018-12-18 PROCEDURE — 99999 PR PBB SHADOW E&M-EST. PATIENT-LVL III: CPT | Mod: PBBFAC,,, | Performed by: OBSTETRICS & GYNECOLOGY

## 2018-12-18 PROCEDURE — 87186 SC STD MICRODIL/AGAR DIL: CPT

## 2018-12-18 RX ORDER — VITAMIN B COMPLEX
1 CAPSULE ORAL DAILY
COMMUNITY

## 2018-12-18 RX ORDER — CEFTRIAXONE 500 MG/1
500 INJECTION, POWDER, FOR SOLUTION INTRAMUSCULAR; INTRAVENOUS
Status: COMPLETED | OUTPATIENT
Start: 2018-12-18 | End: 2018-12-18

## 2018-12-18 RX ORDER — FLUCONAZOLE 200 MG/1
TABLET ORAL
Qty: 30 TABLET | Refills: 0 | Status: SHIPPED | OUTPATIENT
Start: 2018-12-18 | End: 2021-02-01 | Stop reason: SDUPTHER

## 2018-12-18 RX ADMIN — CEFTRIAXONE SODIUM 500 MG: 500 INJECTION, POWDER, FOR SOLUTION INTRAMUSCULAR; INTRAVENOUS at 01:12

## 2018-12-18 NOTE — PROGRESS NOTES
Ceftriaxone 500 mg administered IM to RUOG as ordered. Patient waited x 20 minutes with no reaction noted.

## 2018-12-18 NOTE — PROGRESS NOTES
Subjective:    Patient ID: Anat Parks is a 38 y.o. y.o. female.     Chief Complaint:   Chief Complaint   Patient presents with    Urinary Urgency     urgency, burning and pain       History of Present Illness:  Anat presents today complaining of dysuria, urgency, and pelvic pain.  Symptoms developed in the past 1-2 days. Reports she has pelvic pain after she voids.   States this is intermittent.  She has also had right flank pain. States she gets vulvar sensitivity after wiping so much after voiding.  She has also noted burning dysuria.  Denies fever, chills, nausea, and vomiting.  She recently had gastric bypass last month. She requests refill of diflucan which she often requires after intercourse with her . She has family history of breast cancer.  Youngest relative with breast cancer was in her 40s. She has a history of hysterectomy.    The following portions of the patient's history were reviewed and updated as appropriate: allergies, current medications, past family history, past medical history, past social history, past surgical history and problem list.    Review of Systems   Genitourinary: Positive for dysuria, flank pain, frequency and pelvic pain.   All other systems reviewed and are negative.        Objective:    Vital Signs:  Vitals:    12/18/18 1253   BP: 106/72   Pulse: (!) 53   Resp: 16       Physical Exam:  General:  alert; normal appearing; well-nourished female   Skin:  Skin color, texture, turgor normal. No rashes or lesions   Abdomen:  soft, mild midline suprapubic tenderness; no rebound or guarding. Mild right CVA tenderness. Bowel sounds normal. No masses,  no organomegaly   Pelvis: External genitalia: normal general appearance  Urinary system: urethral meatus normal, bladder nontender  Vaginal: normal mucosa without prolapse or lesions; scant white discharge  Cervix: surgically absent  Uterus: surgically absent  Adnexa: normal bimanual exam         Assessment:      1.  UTI symptoms    2. Dysuria    3. Urinary urgency    4. Encounter for screening mammogram for breast cancer    5. Family history of breast cancer    6. Vulvar irritation          Plan:      UTI symptoms  -     POCT URINE DIPSTICK WITHOUT MICROSCOPE  -     Urine culture  -     cefTRIAXone injection 500 mg    Dysuria  -     Urine culture  -     cefTRIAXone injection 500 mg    Urinary urgency  -     Urine culture  -     cefTRIAXone injection 500 mg    Encounter for screening mammogram for breast cancer  -     Mammo Digital Screening Bilat w/ Gab; Future; Expected date: 12/18/2018    Family history of breast cancer  -     Mammo Digital Screening Bilat w/ Gab; Future; Expected date: 12/18/2018    Vulvar irritation  -     fluconazole (DIFLUCAN) 200 MG Tab; Take one tablet (200 mg) as needed for vulvar irritation.May take one tablet every 3 days as needed.Do not exceed 3 tablets consecutively.  Dispense: 30 tablet; Refill: 0  -     Vaginosis Screen by DNA Probe    Pt encouraged to increase PO hydration.  Will empirically treat with ceftriaxone but will follow up on testing and manage accordingly.  Pain, fever precautions.

## 2018-12-19 LAB
CANDIDA RRNA VAG QL PROBE: NEGATIVE
G VAGINALIS RRNA GENITAL QL PROBE: NEGATIVE
T VAGINALIS RRNA GENITAL QL PROBE: NEGATIVE

## 2018-12-21 LAB — BACTERIA UR CULT: NORMAL

## 2018-12-22 ENCOUNTER — PATIENT MESSAGE (OUTPATIENT)
Dept: OBSTETRICS AND GYNECOLOGY | Facility: CLINIC | Age: 38
End: 2018-12-22

## 2018-12-24 DIAGNOSIS — N30.00 ACUTE CYSTITIS WITHOUT HEMATURIA: Primary | ICD-10-CM

## 2018-12-24 RX ORDER — NITROFURANTOIN 25; 75 MG/1; MG/1
100 CAPSULE ORAL 2 TIMES DAILY
Qty: 14 CAPSULE | Refills: 0 | Status: SHIPPED | OUTPATIENT
Start: 2018-12-24 | End: 2019-01-02

## 2018-12-27 ENCOUNTER — PATIENT MESSAGE (OUTPATIENT)
Dept: BARIATRICS | Facility: CLINIC | Age: 38
End: 2018-12-27

## 2018-12-28 ENCOUNTER — TELEPHONE (OUTPATIENT)
Dept: BARIATRICS | Facility: CLINIC | Age: 38
End: 2018-12-28

## 2018-12-28 NOTE — TELEPHONE ENCOUNTER
Returned patient call.  She stated she picked up her prescription from the [facility] for January for Omeprazole 40 mg twice daily; but needs clarification because post operative visit notes stated take 40 mg daily.  She will also need the correct dosage script sent to [facility] when confirmed with [facility]NAN for her February prescription.  She stated she had been taking 40 mg twice daily and doing well; Notified patient that the RENAN was not in at this time.  Instructed patient to resume her current regimen until I could confirm on Monday.  Patient verbalized understanding.   Message routed to UDAY Maloney, to review.

## 2018-12-28 NOTE — TELEPHONE ENCOUNTER
----- Message from Mily Davidson sent at 12/28/2018  3:26 PM CST -----  Contact: Self  Pt is calling to speak with Staff regarding Omeprazole (PRILOSEC) 40 MG capsule, twice daily, but says Dr. Renee's Nurse says she should only take it once daily, in lieu of twice daily; needs clarification.  In addition, she wants to change over to [facility] so she does not have to pay for it.    She can be reached at [phone].    Thank you.

## 2018-12-31 ENCOUNTER — DOCUMENTATION ONLY (OUTPATIENT)
Dept: BARIATRICS | Facility: CLINIC | Age: 38
End: 2018-12-31

## 2018-12-31 DIAGNOSIS — K21.9 GASTROESOPHAGEAL REFLUX DISEASE, ESOPHAGITIS PRESENCE NOT SPECIFIED: Primary | ICD-10-CM

## 2018-12-31 DIAGNOSIS — Z98.84 S/P LAPAROSCOPIC SLEEVE GASTRECTOMY: ICD-10-CM

## 2018-12-31 RX ORDER — OMEPRAZOLE 40 MG/1
40 CAPSULE, DELAYED RELEASE ORAL EVERY MORNING
Qty: 30 CAPSULE | Refills: 2 | Status: SHIPPED | OUTPATIENT
Start: 2018-12-31 | End: 2019-02-12 | Stop reason: SDUPTHER

## 2018-12-31 NOTE — PROGRESS NOTES
If once daily of priolsec is working without any complaints can continue, will be tapered at 3 month visit.     New script called into preferred pharmacy. Suzanne ACE notified.     -Ana Palmer PA-C

## 2019-01-02 ENCOUNTER — TELEPHONE (OUTPATIENT)
Dept: BARIATRICS | Facility: CLINIC | Age: 39
End: 2019-01-02

## 2019-01-02 NOTE — TELEPHONE ENCOUNTER
Called patient to inform her of the dosage for the PPI.  She stated that Felecia had discussed instructions with her as she had called earlier about another issue.  Patient is aware that prescription has been sent to [facility] for Omeprazole twice daily; but can be taken daily if symptoms worsen can then increase to twice daily.  Patient verbalized understanding of instructions.

## 2019-01-02 NOTE — TELEPHONE ENCOUNTER
ISHA Maloney RN             Once daily is fine as long as she is not having any issue which seems to be true. New script called in.     -Subhash'    Previous Messages      ----- Message -----   From: Suzanne Steven RN   Sent: 12/28/2018   6:14 PM   To: Subhash Palmer PA-C     Should pt be on 40 mg once or twice daily- she had an EGD before surgery and Yessica placed her on twice daily.  She has been taking twice daily since but not sure of dosage due to her post operative visit notes (which says daily)   Please review and advise - she will also need a script send to [facility] with current dosage

## 2019-01-02 NOTE — TELEPHONE ENCOUNTER
Spoke to pt, told her the rx called in by Subhash for omeprazole states qd not bid. Pt said she is a little constipated, did have a BM yesterday. Pt taking miralax daily, told pt to increase to 2 miralax daily and see if it helps. Also to increase h20

## 2019-01-02 NOTE — TELEPHONE ENCOUNTER
----- Message from Stacey Lucio sent at 1/2/2019  8:55 AM CST -----  Contact: pt   Needs Advice    Reason for call: pt is calling to speak with the nurse pt said she has some questions about her medication called omeprazole (PRILOSEC) 40 MG capsule. Pt said her problem now is that she can't use the bathroom taking this medication pt is asking to speak with Felecia         Communication Preference: can you please call pt at [phone]    Additional Information: none

## 2019-01-07 ENCOUNTER — OFFICE VISIT (OUTPATIENT)
Dept: PSYCHIATRY | Facility: CLINIC | Age: 39
End: 2019-01-07
Payer: OTHER GOVERNMENT

## 2019-01-07 VITALS
DIASTOLIC BLOOD PRESSURE: 80 MMHG | HEART RATE: 86 BPM | HEIGHT: 68 IN | SYSTOLIC BLOOD PRESSURE: 130 MMHG | BODY MASS INDEX: 28.83 KG/M2 | WEIGHT: 190.25 LBS | RESPIRATION RATE: 19 BRPM

## 2019-01-07 DIAGNOSIS — F41.1 GAD (GENERALIZED ANXIETY DISORDER): Primary | ICD-10-CM

## 2019-01-07 DIAGNOSIS — F33.42 MAJOR DEPRESSIVE DISORDER, RECURRENT, IN FULL REMISSION: ICD-10-CM

## 2019-01-07 DIAGNOSIS — E66.9 OBESITY (BMI 30.0-34.9): ICD-10-CM

## 2019-01-07 DIAGNOSIS — F41.0 PANIC DISORDER: ICD-10-CM

## 2019-01-07 DIAGNOSIS — F51.04 PSYCHOPHYSIOLOGICAL INSOMNIA: ICD-10-CM

## 2019-01-07 PROCEDURE — 99213 OFFICE O/P EST LOW 20 MIN: CPT | Mod: S$PBB,,, | Performed by: PSYCHIATRY & NEUROLOGY

## 2019-01-07 PROCEDURE — 90833 PSYTX W PT W E/M 30 MIN: CPT | Mod: PBBFAC | Performed by: PSYCHIATRY & NEUROLOGY

## 2019-01-07 PROCEDURE — 90833 PR PSYCHOTHERAPY W/PATIENT W/E&M, 30 MIN (ADD ON): ICD-10-PCS | Mod: S$PBB,,, | Performed by: PSYCHIATRY & NEUROLOGY

## 2019-01-07 PROCEDURE — 99213 OFFICE O/P EST LOW 20 MIN: CPT | Mod: PBBFAC | Performed by: PSYCHIATRY & NEUROLOGY

## 2019-01-07 PROCEDURE — 99999 PR PBB SHADOW E&M-EST. PATIENT-LVL III: CPT | Mod: PBBFAC,,, | Performed by: PSYCHIATRY & NEUROLOGY

## 2019-01-07 PROCEDURE — 99213 PR OFFICE/OUTPT VISIT, EST, LEVL III, 20-29 MIN: ICD-10-PCS | Mod: S$PBB,,, | Performed by: PSYCHIATRY & NEUROLOGY

## 2019-01-07 PROCEDURE — 90833 PSYTX W PT W E/M 30 MIN: CPT | Mod: S$PBB,,, | Performed by: PSYCHIATRY & NEUROLOGY

## 2019-01-07 PROCEDURE — 99999 PR PBB SHADOW E&M-EST. PATIENT-LVL III: ICD-10-PCS | Mod: PBBFAC,,, | Performed by: PSYCHIATRY & NEUROLOGY

## 2019-01-07 RX ORDER — BUPROPION HYDROCHLORIDE 100 MG/1
100 TABLET ORAL 3 TIMES DAILY
Qty: 180 TABLET | Refills: 1 | Status: SHIPPED | OUTPATIENT
Start: 2019-01-07 | End: 2019-03-13 | Stop reason: ALTCHOICE

## 2019-01-07 RX ORDER — ALPRAZOLAM 0.5 MG/1
0.5 TABLET ORAL 2 TIMES DAILY PRN
Qty: 60 TABLET | Refills: 5 | Status: SHIPPED | OUTPATIENT
Start: 2019-01-07 | End: 2019-03-13 | Stop reason: DRUGHIGH

## 2019-01-07 RX ORDER — AMITRIPTYLINE HYDROCHLORIDE 75 MG/1
75 TABLET ORAL NIGHTLY
Qty: 90 TABLET | Refills: 1 | Status: SHIPPED | OUTPATIENT
Start: 2019-01-07 | End: 2019-05-08 | Stop reason: SDUPTHER

## 2019-01-07 NOTE — PROGRESS NOTES
"Outpatient Psychiatry Follow-Up Visit (MD/NP)    1/7/2019    Clinical Status of Patient:  Outpatient (Ambulatory)    Chief Complaint:  Anat Parks is a 38 y.o. female who presents today for follow-up of depression and anxiety.  Met with patient and spouse.      Interval History and Content of Current Session:  Interim Events/Subjective Report/Content of Current Session:   The patient was seen and her chart reviewed. Reviewed notes from Kay Corona MD at 12/19/2018, Berna Robertson RD at 12/11/2018, and Subhash Palmer PA-C at 12/11/2018 .    She has been compliant with treatment. She had no adverse reactions or side effects.  She tolerated the previous medication changes well without complications. Weight loss procedure has not effected her medications' efficacy as of this time.    No new psychosocial stressors were presented today. Her marriage is going well. She continues to deal with family struggles (balancing work/home/life responsibilities), but this is well managed. Her  previously had a successful tx with the spinal stimulator for his chronic pain (some recent adjustments were made)- he is doing better with his pain overall, but he has continued PTSD-related issues (may see a bariatric specialist soon). 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 Her youngest son is attending [employer] but had some medical issues. Her oldest left the [employer]- he is now employed and doing better.      She had no new medical issues since she was last seen. She and her family are working on their diet and have been losing weight- she was approved for and completed the gastric by-pass surgery (it went well).  She previously had had a sleep study- was diagnosed with mild ERICH and recommended to loose weight to correct it.     "I've been pretty good."     Improved/Controlled Symptoms of Depression: no diminished mood, no loss of " interest/anhedonia; no irritability, no diminished energy, no change in sleep (see below; improved overall), no change in appetite, no diminished concentration or cognition or indecisiveness, no PMA/R, no excessive guilt, denied hopelessness/worthlessness, no suicidal ideations    Controlled Sleep disturbance: no trouble with initiation (relieved with medications- may happen occasionally; may be getting some tolerance to the lunesta), no issues maintenance, no early morning awakening with inability to return to sleep; getting about 8-12 hours per night on average. She had not required  lunesta since the last appointment     No Suicidal/Homicidal ideations: no active/passive ideations, organized plans, or future intentions; no past attempts.     Denied Symptoms of CHRISTINE: no excessive anxiety/worry/fear, no restlessness, no fatigue, no poor concentration, no irritability, no muscle tension, no sleep disturbance     Mostly Controlled but persistent symptoms of Panic Disorder: she is now averaging about 1 panic attack per 2 weeks- remain triggered but somewhat difficult to manage. They resolve quickly with xanax.    No Symptoms of sexual disorders: no decrease in libido/desire (better); no issues and orgasmic (better); no pain    Overall, her IBS persists but is controlled; Back pain is better but persists.     Weight: she is working on dieting to lose weight. Her weight on 10/31/17 was 254 lbs; weight on 2/7/18 was 240 lbs; weight on 4/30 was 241 lbs and is 223 lbs on 10/8/18. It is 190 lbs today.      Previous medication trials include luvox (increased irritability).    PSYCHOTHERAPY ADD-ON +[id number]   30 (16-37*) minutes    Time: 16 minutes  Participants: Met with patient    Therapeutic Intervention Type: insight oriented psychotherapy, behavior modifying psychotherapy, supportive psychotherapy  Why chosen therapy is appropriate versus another modality: relevant to diagnosis, patient responds to this modality, evidence  based practice    Target symptoms: depression, anxiety   Primary focus: anxiety, psychosocial stressors  Psychotherapeutic techniques: supportive techniques; psycho-education    Outcome monitoring methods: self-report, observation    Patient's response to intervention:  The patient's response to intervention is accepting.    Progress toward goals:  The patient's progress toward goals is good.              Review of Systems   · PSYCHIATRIC: Pertinant items are noted in the narrative.  · CONSTITUTIONAL: No weight gain or loss.   · MUSCULOSKELETAL: No pain or stiffness of the joints.  · NEUROLOGIC: No weakness, sensory changes, seizures, confusion, memory loss, tremor or other abnormal movements.  · ENDOCRINE: No polydipsia or polyuria.  · INTEGUMENTARY: No rashes or lacerations.  · EYES: No exophthalmos, jaundice or blindness.  · ENT: No dizziness, tinnitus or hearing loss.  · RESPIRATORY: No shortness of breath.  · CARDIOVASCULAR: No tachycardia or chest pain.  · GASTROINTESTINAL: No nausea, vomiting, pain, constipation or diarrhea.  · GENITOURINARY: No frequency, dysuria or sexual dysfunction.  · HEMATOLOGIC/LYMPHATIC: No excessive bleeding, prolonged or excessive bleeding after dental extraction/injury.  · ALLERGIC/IMMUNOLOGIC: No allergic response to materials, foods or animals at this time.    Past Medical, Family and Social History: The patient's past medical, family and social history have been reviewed and updated as appropriate within the electronic medical record - see encounter notes.    Compliance: yes    Side effects: None    Risk Parameters:  Patient reports no suicidal ideation  Patient reports no homicidal ideation  Patient reports no self-injurious behavior  Patient reports no violent behavior           Exam (detailed: at least 9 elements; comprehensive: all 15 elements)   Constitutional  Vitals:  Most recent vital signs, dated less than 90 days prior to this appointment, were reviewed.   Vitals:     "01/07/19 1123   BP: 130/80   Pulse: 86   Resp: 19   Weight: 86.3 kg (190 lb 4.1 oz)   Height: 5' 8" (1.727 m)     Body mass index is 28.93 kg/m².         General:  unremarkable, age appropriate, casually dressed, neatly groomed, obese     Musculoskeletal  Muscle Strength/Tone:  no spasicity, no rigidity, no dyskinesia, no dystonia, no tremor   Gait & Station:  non-ataxic     Psychiatric  Speech:  no latency; no press, spontaneous   Mood & Affect:  steady "good"   congruent and appropriate, mood-congruent, full   Thought Process:  normal and logical, goal-directed   Associations:  intact   Thought Content:  normal, no suicidality, no homicidality, delusions, or paranoia   Insight:  intact, has awareness of illness   Judgement: behavior is adequate to circumstances, age appropriate   Orientation:  person, place, situation, time/date, day of week, month of year, year   Memory: intact for content of interview, able to remember recent events- yes, able to remember remote events- yes   Language: grossly intact, able to name, able to repeat   Attention Span & Concentration:  able to focus, completed tasks   Fund of Knowledge:  intact and appropriate to age and level of education, familiar with aspects of current personal life, 4 of 4 recent presidents     Assessment and Diagnosis   Status/Progress: Based on the examination today, the patient's problem(s) is/are improved and well controlled.  New problems have not been presented today.   Co-morbidities are not complicating management of the primary condition.  There are no active rule-out diagnoses for this patient at this time.     General Impression:  MDD, recurrent, moderate, in full remission  CHRISTINE  Panic Disorder without agoraphobia   Insomnia    IBS, GERD, ovarian cysts, obesity      Intervention/Counseling/Treatment Plan   Treatment Plan/Recommendations:     Medications:  -Continue Wellbutrin  mg po TID for depression and off-label CHRISTINE.   -Stop Lunesta - no " longer needed  -Continue Elavil 75 mg po q HS for adjunctive depression, anxiety and IBS  -Stop Topamax- no longer needed   -Continue Xanax 0.5 mg po BID prn anxiety  -Continue Probiotic for adjunctive depression     Discussed diagnosis, risks and benefits of proposed treatment vs alternative treatments vs no treatment, and potential side effects of these treatments.  The patient expresses understanding of the above and displays the capacity to agree with this treatment given said understanding.  Patient also agrees that, currently, the benefits outweigh the risks and would like to pursue treatment at this time.    Therapy:  Pt is not interested in regular scheduled appointments at this time; psychotherapy provided today    Counseled:  Counseled on illness and tx options as well as exercise for MH   Counseled on weight loss    Labs:   Labs reviewed    Return to Clinic: 6 months, sooner if needed     Yuriy Vaz MD

## 2019-01-22 ENCOUNTER — PATIENT MESSAGE (OUTPATIENT)
Dept: INTERNAL MEDICINE | Facility: CLINIC | Age: 39
End: 2019-01-22

## 2019-01-30 ENCOUNTER — TELEPHONE (OUTPATIENT)
Dept: INTERNAL MEDICINE | Facility: CLINIC | Age: 39
End: 2019-01-30

## 2019-01-30 ENCOUNTER — OFFICE VISIT (OUTPATIENT)
Dept: INTERNAL MEDICINE | Facility: CLINIC | Age: 39
End: 2019-01-30
Payer: OTHER GOVERNMENT

## 2019-01-30 VITALS
DIASTOLIC BLOOD PRESSURE: 66 MMHG | RESPIRATION RATE: 16 BRPM | HEIGHT: 68 IN | BODY MASS INDEX: 28.24 KG/M2 | OXYGEN SATURATION: 100 % | WEIGHT: 186.31 LBS | SYSTOLIC BLOOD PRESSURE: 92 MMHG | HEART RATE: 82 BPM

## 2019-01-30 DIAGNOSIS — D22.9 ATYPICAL MOLE: ICD-10-CM

## 2019-01-30 DIAGNOSIS — R10.12 LEFT UPPER QUADRANT PAIN: Primary | ICD-10-CM

## 2019-01-30 PROCEDURE — 99213 OFFICE O/P EST LOW 20 MIN: CPT | Mod: PBBFAC | Performed by: NURSE PRACTITIONER

## 2019-01-30 PROCEDURE — 99214 OFFICE O/P EST MOD 30 MIN: CPT | Mod: S$PBB,,, | Performed by: NURSE PRACTITIONER

## 2019-01-30 PROCEDURE — 99999 PR PBB SHADOW E&M-EST. PATIENT-LVL III: CPT | Mod: PBBFAC,,, | Performed by: NURSE PRACTITIONER

## 2019-01-30 PROCEDURE — 99214 PR OFFICE/OUTPT VISIT, EST, LEVL IV, 30-39 MIN: ICD-10-PCS | Mod: S$PBB,,, | Performed by: NURSE PRACTITIONER

## 2019-01-30 PROCEDURE — 99999 PR PBB SHADOW E&M-EST. PATIENT-LVL III: ICD-10-PCS | Mod: PBBFAC,,, | Performed by: NURSE PRACTITIONER

## 2019-01-30 NOTE — PROGRESS NOTES
Subjective:       Patient ID: Anat Parks is a 38 y.o. female.    Chief Complaint: Mass (located on the back of L leg )    HPI: Pt presents to clinic today known to me with c/o left sided pain. She report sthat it started 2 weeks ago. Came through the day. She reports that it is an achy burning feeling when it is bothering her. She reports that it not jd.y It has not been bad since Sat. Noticed that Sat worse after work. Still burning under the rib a little but not as bad as Sunday. It was gone on Sunday. No rash. No specific injury.     Gastric surgery Surgery was Nov 8th.     She also reports that she has a mole on back of right leg. She c/o just noticed. Mat y have been there but just started bothering her.     Review of Systems   Constitutional: Negative for chills, fatigue, fever and unexpected weight change.   HENT: Negative for congestion, ear pain, sore throat and trouble swallowing.    Eyes: Negative for pain and visual disturbance.   Respiratory: Negative for cough, chest tightness and shortness of breath.    Cardiovascular: Negative for chest pain, palpitations and leg swelling.   Gastrointestinal: Positive for abdominal pain. Negative for abdominal distention, blood in stool, constipation, diarrhea, nausea and vomiting.   Genitourinary: Negative for difficulty urinating, dysuria, flank pain, frequency and hematuria.   Musculoskeletal: Negative for back pain, gait problem, joint swelling, neck pain and neck stiffness.   Skin: Positive for color change. Negative for rash and wound.   Neurological: Negative for dizziness, seizures, speech difficulty, light-headedness and headaches.       Objective:      Physical Exam   Constitutional: She is oriented to person, place, and time. She appears well-developed and well-nourished.   HENT:   Head: Normocephalic and atraumatic.   Eyes: Conjunctivae and EOM are normal. Pupils are equal, round, and reactive to light.   Neck: Normal range of motion. Neck  supple.   Cardiovascular: Normal rate, regular rhythm, normal heart sounds and intact distal pulses.   No murmur heard.  Pulmonary/Chest: Effort normal and breath sounds normal. No stridor. No respiratory distress. She has no wheezes. She has no rales.   Abdominal: Soft. Bowel sounds are normal. She exhibits no distension and no mass. There is tenderness (left upper quad is worsedoes grimmace with palp to left lower and mid as well but reports pain not as bad as upper abd.  does c/o pain with palp on ribs but with compression of cage no pain). There is no rebound and no guarding. No hernia.   Musculoskeletal: Normal range of motion. She exhibits no edema.   Neurological: She is alert and oriented to person, place, and time.   Skin: Skin is warm and dry.   Mole noted to back of right thigh <5mm light brown reg borders    Psychiatric: She has a normal mood and affect. Her behavior is normal. Judgment and thought content normal.   Nursing note and vitals reviewed.      Assessment:       1. Left upper quadrant pain    2. Atypical mole        Plan:     Problem List Items Addressed This Visit     None      Visit Diagnoses     Left upper quadrant pain    -  Primary    Relevant Orders    CBC auto differential    Comprehensive metabolic panel    Amylase    Urinalysis    X-Ray Abdomen Flat And Erect    Atypical mole        Relevant Orders    Ambulatory Referral to Dermatology        Does report constipation since surgery. Up to 1  1/2 dose of miralax daily

## 2019-01-30 NOTE — PROGRESS NOTES
Patient notified of appointment information with Dr. Rodriguez 2/12/19 at 3:30PM. Patient verbalized understanding. Referral and demographics faxed to [phone]. Fax confirmation received.

## 2019-01-30 NOTE — TELEPHONE ENCOUNTER
Angelina at Dr. Rodriguez Dermatologist office states referral has to be submitted online for . Sent a message for Megan Junior in pre-cert department for assistance. Appointment scheduled 2/12/19 at 3:30PM.

## 2019-01-31 ENCOUNTER — LAB VISIT (OUTPATIENT)
Dept: LAB | Facility: HOSPITAL | Age: 39
End: 2019-01-31
Attending: NURSE PRACTITIONER
Payer: OTHER GOVERNMENT

## 2019-01-31 DIAGNOSIS — R10.12 LEFT UPPER QUADRANT PAIN: ICD-10-CM

## 2019-01-31 LAB
ALBUMIN SERPL BCP-MCNC: 4.3 G/DL
ALP SERPL-CCNC: 51 U/L
ALT SERPL W/O P-5'-P-CCNC: 51 U/L
AMYLASE SERPL-CCNC: 52 U/L
ANION GAP SERPL CALC-SCNC: 9 MMOL/L
AST SERPL-CCNC: 29 U/L
BASOPHILS # BLD AUTO: 0.02 K/UL
BASOPHILS NFR BLD: 0.3 %
BILIRUB SERPL-MCNC: 0.3 MG/DL
BUN SERPL-MCNC: 14 MG/DL
CALCIUM SERPL-MCNC: 9.9 MG/DL
CHLORIDE SERPL-SCNC: 105 MMOL/L
CO2 SERPL-SCNC: 24 MMOL/L
CREAT SERPL-MCNC: 0.9 MG/DL
DIFFERENTIAL METHOD: ABNORMAL
EOSINOPHIL # BLD AUTO: 0 K/UL
EOSINOPHIL NFR BLD: 0.6 %
ERYTHROCYTE [DISTWIDTH] IN BLOOD BY AUTOMATED COUNT: 15.8 %
EST. GFR  (AFRICAN AMERICAN): >60 ML/MIN/1.73 M^2
EST. GFR  (NON AFRICAN AMERICAN): >60 ML/MIN/1.73 M^2
GLUCOSE SERPL-MCNC: 82 MG/DL
HCT VFR BLD AUTO: 38.5 %
HGB BLD-MCNC: 12.3 G/DL
LYMPHOCYTES # BLD AUTO: 2.2 K/UL
LYMPHOCYTES NFR BLD: 30.6 %
MCH RBC QN AUTO: 26.8 PG
MCHC RBC AUTO-ENTMCNC: 31.9 G/DL
MCV RBC AUTO: 84 FL
MONOCYTES # BLD AUTO: 0.5 K/UL
MONOCYTES NFR BLD: 7.4 %
NEUTROPHILS # BLD AUTO: 4.4 K/UL
NEUTROPHILS NFR BLD: 61.1 %
PLATELET # BLD AUTO: 255 K/UL
PMV BLD AUTO: 10.7 FL
POTASSIUM SERPL-SCNC: 4 MMOL/L
PROT SERPL-MCNC: 7.1 G/DL
RBC # BLD AUTO: 4.59 M/UL
SODIUM SERPL-SCNC: 138 MMOL/L
WBC # BLD AUTO: 7.19 K/UL

## 2019-01-31 PROCEDURE — 80053 COMPREHEN METABOLIC PANEL: CPT

## 2019-01-31 PROCEDURE — 36415 COLL VENOUS BLD VENIPUNCTURE: CPT

## 2019-01-31 PROCEDURE — 82150 ASSAY OF AMYLASE: CPT

## 2019-01-31 PROCEDURE — 85025 COMPLETE CBC W/AUTO DIFF WBC: CPT

## 2019-02-01 ENCOUNTER — HOSPITAL ENCOUNTER (OUTPATIENT)
Dept: RADIOLOGY | Facility: HOSPITAL | Age: 39
Discharge: HOME | End: 2019-02-01
Attending: NURSE PRACTITIONER
Payer: OTHER GOVERNMENT

## 2019-02-01 DIAGNOSIS — R10.12 LEFT UPPER QUADRANT PAIN: ICD-10-CM

## 2019-02-01 PROCEDURE — 74019 RADEX ABDOMEN 2 VIEWS: CPT | Mod: TC

## 2019-02-08 ENCOUNTER — PATIENT MESSAGE (OUTPATIENT)
Dept: INTERNAL MEDICINE | Facility: CLINIC | Age: 39
End: 2019-02-08

## 2019-02-12 DIAGNOSIS — K21.9 GASTROESOPHAGEAL REFLUX DISEASE, ESOPHAGITIS PRESENCE NOT SPECIFIED: ICD-10-CM

## 2019-02-12 DIAGNOSIS — Z98.84 S/P LAPAROSCOPIC SLEEVE GASTRECTOMY: ICD-10-CM

## 2019-02-12 RX ORDER — OMEPRAZOLE 40 MG/1
40 CAPSULE, DELAYED RELEASE ORAL EVERY MORNING
Qty: 30 CAPSULE | Refills: 2 | Status: CANCELLED | OUTPATIENT
Start: 2019-02-12

## 2019-02-12 RX ORDER — OMEPRAZOLE 40 MG/1
40 CAPSULE, DELAYED RELEASE ORAL EVERY MORNING
Qty: 30 CAPSULE | Refills: 2 | Status: SHIPPED | OUTPATIENT
Start: 2019-02-12 | End: 2019-03-01

## 2019-02-18 ENCOUNTER — OFFICE VISIT (OUTPATIENT)
Dept: BARIATRICS | Facility: CLINIC | Age: 39
End: 2019-02-18
Payer: OTHER GOVERNMENT

## 2019-02-18 ENCOUNTER — CLINICAL SUPPORT (OUTPATIENT)
Dept: BARIATRICS | Facility: CLINIC | Age: 39
End: 2019-02-18
Payer: OTHER GOVERNMENT

## 2019-02-18 ENCOUNTER — HOSPITAL ENCOUNTER (OUTPATIENT)
Dept: RADIOLOGY | Facility: HOSPITAL | Age: 39
Discharge: HOME | End: 2019-02-18
Attending: OBSTETRICS & GYNECOLOGY
Payer: OTHER GOVERNMENT

## 2019-02-18 ENCOUNTER — PATIENT MESSAGE (OUTPATIENT)
Dept: BARIATRICS | Facility: CLINIC | Age: 39
End: 2019-02-18

## 2019-02-18 VITALS
WEIGHT: 180.56 LBS | DIASTOLIC BLOOD PRESSURE: 67 MMHG | SYSTOLIC BLOOD PRESSURE: 112 MMHG | BODY MASS INDEX: 27.36 KG/M2 | HEIGHT: 68 IN | HEART RATE: 67 BPM

## 2019-02-18 DIAGNOSIS — Z98.890 POST-OPERATIVE STATE: Primary | ICD-10-CM

## 2019-02-18 DIAGNOSIS — R63.4 WEIGHT LOSS: ICD-10-CM

## 2019-02-18 DIAGNOSIS — Z80.3 FAMILY HISTORY OF BREAST CANCER: ICD-10-CM

## 2019-02-18 DIAGNOSIS — Z12.31 ENCOUNTER FOR SCREENING MAMMOGRAM FOR BREAST CANCER: ICD-10-CM

## 2019-02-18 DIAGNOSIS — K21.9 GASTROESOPHAGEAL REFLUX DISEASE, ESOPHAGITIS PRESENCE NOT SPECIFIED: ICD-10-CM

## 2019-02-18 PROBLEM — E66.01 MORBID OBESITY: Status: RESOLVED | Noted: 2018-11-08 | Resolved: 2019-02-18

## 2019-02-18 PROCEDURE — 99499 UNLISTED E&M SERVICE: CPT | Mod: S$PBB,,, | Performed by: DIETITIAN, REGISTERED

## 2019-02-18 PROCEDURE — 77063 MAMMO DIGITAL SCREENING BILAT WITH TOMOSYNTHESIS_CAD: ICD-10-PCS | Mod: 26,,, | Performed by: RADIOLOGY

## 2019-02-18 PROCEDURE — 99999 PR PBB SHADOW E&M-EST. PATIENT-LVL III: CPT | Mod: PBBFAC,,, | Performed by: PHYSICIAN ASSISTANT

## 2019-02-18 PROCEDURE — 77067 SCR MAMMO BI INCL CAD: CPT | Mod: 26,,, | Performed by: RADIOLOGY

## 2019-02-18 PROCEDURE — 77067 MAMMO DIGITAL SCREENING BILAT WITH TOMOSYNTHESIS_CAD: ICD-10-PCS | Mod: 26,,, | Performed by: RADIOLOGY

## 2019-02-18 PROCEDURE — 99999 PR PBB SHADOW E&M-EST. PATIENT-LVL III: ICD-10-PCS | Mod: PBBFAC,,, | Performed by: PHYSICIAN ASSISTANT

## 2019-02-18 PROCEDURE — 77067 SCR MAMMO BI INCL CAD: CPT | Mod: TC

## 2019-02-18 PROCEDURE — 99024 PR POST-OP FOLLOW-UP VISIT: ICD-10-PCS | Mod: ,,, | Performed by: PHYSICIAN ASSISTANT

## 2019-02-18 PROCEDURE — 99213 OFFICE O/P EST LOW 20 MIN: CPT | Mod: PBBFAC | Performed by: PHYSICIAN ASSISTANT

## 2019-02-18 PROCEDURE — 77063 BREAST TOMOSYNTHESIS BI: CPT | Mod: 26,,, | Performed by: RADIOLOGY

## 2019-02-18 PROCEDURE — 99024 POSTOP FOLLOW-UP VISIT: CPT | Mod: ,,, | Performed by: PHYSICIAN ASSISTANT

## 2019-02-18 PROCEDURE — 99499 NO LOS: ICD-10-PCS | Mod: S$PBB,,, | Performed by: DIETITIAN, REGISTERED

## 2019-02-18 NOTE — PROGRESS NOTES
NUTRITION NOTE    Referring Physician: Kale Renee M.D.  Reason for MNT Referral: Follow-up 3 months s/p Gastric Bypass    PAST MEDICAL HISTORY:    Denies nausea, vomiting and diarrhea. Pt reports constipation but taking grapes or miralax to help.  Reports doing well.    Past Medical History:   Diagnosis Date    Abdominal pain, other specified site     Right lower abdomen    Acid reflux     Anxiety     Bilateral ovarian cysts     Mostly on right, but left also    Bulging lumbar disc     DDD (degenerative disc disease), lumbar     Degenerative disc disease Nov 2017    Lower back    Depression     Fatigue     Fatty liver     Fibroid tumor     Right ovary-sm    Heartburn     IBS (irritable bowel syndrome)     Menorrhagia     Ulcer 2014    Had an upper gi take Prilosec       CLINICAL DATA:  38 y.o. female.    Current Weight: 180 lbs  BMI: 27.45  Total Weight Loss: 61 Lbs (since initial consultation in bariatric surgery 5/2018)    LABS:  Reviewed.    CURRENT DIET:  Bariatric Diet.  Diet Recall:  grams of protein/day; 80+ oz of fluids/day (v8 energy, water, powerade zero)    Breakfast: egg frittata or lisa marcial delight bowls  Snack: protein shake   Snack : grapes  Lunch: roast beef and cheddar  Snack: protein shake  Snack: gluten free crackers (nut and rice) with pb  Dinner: chicken with broccoli or meatballs with edammame pasta or hotdog with chili or steak with cauliflower mash      Meal Pattern: 3 meal(s) + 2snack(s) + 2 protein supplement(s)  Adequate protein supplement intake.  Adequate vegetable intake.   Adequate fruit intake.    EXERCISE:  Adequate light exercise. walking on avg 14,000 steps per day    Restrictions to Exercise: None.    VITAMINS / MINERALS:  Multivitamins: FC x 2  B-Complex (has enough of B12): daily  Calcium Citrate + Vitamin D: x3/day  Hair, skin, nail x 2    ASSESSMENT:  Doing well overall.  Weight loss.  Adequate calorie intake.  Adequate protein intake.  Adequate  fluid intake.  Following diet appropriately.  Exercising.  Adequate vitamins & minerals.    BARIATRIC DIET DISCUSSION:  Instructed and provided written materials on bariatric diet plan.  Reinforced post-op nutrition guidelines.    PLAN / RECOMMENDATIONS:  May begin to incorporate raw vegetables, lettuce, unsalted nuts as tolerated.  Continue excellent diet plan.  Maintain protein intake.  Maintain fluid intake.  Continue exercise.  Continue appropriate vitamins & minerals. OK to take Bcomplex whole and half the hair, skin, nail.    SESSION TIME: 25 minutes

## 2019-02-18 NOTE — PROGRESS NOTES
BARIATRIC POST-OPERATIVE VISIT:    HPI:  Anat Parks is a 38 y.o. year old female presents for 4 post op visit following s/p gastric sleeve.  she is doing well and tolerating the diet without difficulty.      Pt states that she is very happy with her results.    Constipation varies, states that if she eats her grapes no constipation.    Still needs the Prilosec states was on 80 mg now states that she is on 40 mg and it works great.     Denies: nausea, vomiting, abdominal pain, fever, chills, dysphagia, chest pain, and shortness of breath.    Review of Systems   Constitutional: Negative for activity change, appetite change, chills and fever.   HENT: Negative for tinnitus.    Respiratory: Negative for cough, choking and shortness of breath.    Cardiovascular: Negative for chest pain and leg swelling.   Gastrointestinal: Positive for constipation (intermittently). Negative for abdominal pain, diarrhea, nausea and vomiting.   Genitourinary: Negative for dysuria and hematuria.   Musculoskeletal: Negative for gait problem and myalgias.   Skin: Negative for rash.   Neurological: Negative for dizziness and headaches.       EXERCISE & VITAMINS:  See Bariatric Assessment   Adherent to bariatric vitamin regimen (switched to bariatric advantage for B12 and calcium citrate)     MEDICATIONS/ALLERGIES:  Have been reviewed.      DIET: Regular Bariatric Diet.   Protein oat meal   Egg frittata   Gluten free waffle with sugar free syrup   Peanut butter as a snack   Chicken  Premier and percy bar for snacks  2 shakes ( one after breakfast and one as a snack in between breakfast and lunch)  Powerade zero   6 oz cup of coffee every other day   Steak bits with cheese   Turkey scrambles  Chickpea pasta     See Dietician note from today for a more detailed assessment.      Physical Exam   Constitutional: She is oriented to person, place, and time. She appears well-developed and well-nourished.   HENT:   Head: Normocephalic and  atraumatic.   Eyes: Conjunctivae and EOM are normal. Pupils are equal, round, and reactive to light.   Neck: Normal range of motion. Neck supple. No JVD present. No thyromegaly present.   Cardiovascular: Normal rate, regular rhythm, normal heart sounds and intact distal pulses.   No murmur heard.  Pulmonary/Chest: Effort normal and breath sounds normal. No respiratory distress.   Abdominal: Soft. Bowel sounds are normal. She exhibits no mass. There is no tenderness (WHSS). There is no rebound.   Musculoskeletal: Normal range of motion. She exhibits no edema.   Neurological: She is alert and oriented to person, place, and time.   Skin: Skin is warm and dry. Capillary refill takes less than 2 seconds. No rash noted. No erythema.   Psychiatric: She has a normal mood and affect. Her behavior is normal. Judgment and thought content normal.       ASSESSMENT:  - Morbid obesity s/p sleeve gastrectomy on 11/08/18.  - Co-morbidities: GERD and IBS, dyspepsia, fatty liver   - Good Weight loss, 37# and 62% EWL  - Good Exercise routine  - Good Diet  - Good Vitamin regimen    PLAN:  - Anti-Acid medication   - No lifting restrictions  - Miralax daily for constipation  - Emphasized the importance of regular exercise and adherence to bariatric diet to achieve maximum weight loss.  - Encouraged patient to start/continue regular exercise.  - Follow-up with dietician to advance diet.  - Continue daily vitamins and medications.  - RTC as scheduled   - Call the office for any issues.  - Check labs today.

## 2019-02-18 NOTE — PATIENT INSTRUCTIONS
Meal Ideas for Regular Bariatric Diet  *Recipes and products available at [email or web address]      Breakfast: (15-20g protein)    - Egg white omelet: 2 egg whites or ½ cup Egg Beaters. (Optional proteins: cheese, shrimp, black beans, chicken, sliced turkey) (Optional veggies: tomatoes, salsa, spinach, mushrooms, onions, green peppers, or small slice avocado)     - Egg and sausage: 1 egg or ¼ cup Egg Beaters (any variety), with 1 nila or 2 links of Turkey sausage or Veggie breakfast sausage ([employer] or [employer])    - Crust-less breakfast quiche: To make a glass pie dish, mix 4oz part skim Ricotta, 1 cup skim milk, and 2 eggs as your base. Add protein: shredded cheese, sliced lean ham or turkey, turkey sandoval/sausage. Add veggies: tomato, onion, green onion, mushroom, green pepper, spinach, etc.    - Yogurt parfait: Mix 1 - 6oz container Dannon Light N Fit vanilla yogurt, with ¼ cup crushed unsalted nuts    - Cottage cheese and fruit: ½ cup part-skim cottage cheese or ricotta cheese topped with fresh fruit or sugar free preserves     - Romy Humphries's Vanilla Egg custard* (add 2 Tbsp instant coffee granules to make Cappuccino Custard*)    - Hi-Protein café latte (skim milk, decaf coffee, 1 scoop protein powder). Optional to add Sugar free syrup or extract flavoring.    - Breakfast Lox: spread fat free cream cheese on slices of smoked salmon. Serve over scrambled or egg over easy (sauteed with nonstick cookspray) OR on a cucumber slice    - Eggwhich: Scramble or cook 1 large egg over easy using nonstick cookspray. Place between 2 slices of [place] sandoval and low fat cheese.     Lunch: (20-30g protein)    - ½ cup Black bean soup (Homemade or Progresso), with ¼ cup shredded low-fat cheese. Top with chopped tomato or fresh salsa.     - Lean deli turkey breast and low-fat sliced cheese, mustard or light chatman to moisten, rolled up together, or wrapped in a Edvin lettuce leaf    - Chicken salad made from dinner  leftovers, moisten with low-fat salad dressing or light chatman. Also try leftover salmon, shrimp, tuna or boiled eggs. Serve ½ cup over dark green salad    - Fat-free canned refried beans, topped with ¼ cup shredded low-fat cheese. Top with chopped tomato or fresh salsa.     - Greek salad: Top mixed greens with 1-2oz grilled chicken, tomatoes, red onions, 2-3 kalamata olives, and sprinkle lightly with feta cheese. Spritz with Balsamic vinegar to taste.     - Crust-less lunch quiche: To make a glass pie dish, mix 4oz part skim Ricotta, 1 cup skim milk, and 2 eggs as your base. Add protein: shredded cheese, sliced lean ham or turkey, shrimp, chicken. Add veggies: tomato, onion, green onion, mushroom, green pepper, spinach, artichoke, broccoli, etc.    - Pizza bake: spread a  louise leslie mushroom with tomato sauce, low-fat shredded mozzarella and turkey pepperoni or [place] sandoval. Add any veggies. Roast for 10-15 minutes, until cheese melted.     - Cucumber crab bites: Spread ¼ cup crab dip (lump crabmeat + light cream cheese and green onions) over sliced cucumber.     - Chicken with light spinach and artichoke dip*: Puree in : 6oz cooked and drained spinach, 2 cloves garlic, 1 can cannelloni beans, ½ cup chopped green onions, 1 can drained artichoke hearts (not marinated in oil), lemon juice and basil. Mix in 2oz chopped up chicken.    Supper: (20-30g protein)    - Serve grilled fish over dark green salad tossed with low-fat dressing, served with grilled asparagus quigley     - Rotisserie chicken salad: served with sliced strawberries, walnuts, fat-free feta cheese crumbles and 1 tbsp Ivys Own Light Raspberry [place] Vinaigrette    - Shrimp cocktail: Dip cold boiled shrimp in homemade low-sugar cocktail sauce (1/2 cup Alfa One Carb ketchup, 2 tbsp horseradish, 1/4 tsp hot sauce, 1 tsp Worcestershire sauce, 1 tbsp freshly-squeezed lemon juice). Serve with dark green salad, walnuts, and crumbled blue  cheese drizzled with olive oil and Balsamic vinegar    - Tuna Melt: Spread tuna salad onto 2 thick slices of tomato. Top with low-fat cheese and broil until cheese is melted. May also be made with chicken salad of shrimp salad. [place] with different types of cheeses.    - Chicken or beef fajitas (no tortilla, rice, beans, chips). Top meat and veggies w/ fresh salsa, fat free sour cream.     - Homemade low-fat Chili using extra lean ground beef or ground turkey. Top with shredded cheese and salsa as desired. May add dollop fat-free sour cream if desired    - Chicken parmesan: Top chicken breast w/ low sugar marinara sauce, mozzarella cheese and bake until chicken reaches 165*.  Serve w/ spaghetti SQUASH or Latvian cut green beans    - Dinner Omelet with shrimp or chicken and onion, green peppers and chives.    - No noodle lasagna: Use sliced zucchini or eggplant in place of noodles.  Layer with part skim ricotta cheese and low sugar meat sauce (use very lean ground beef or ground turkey).    - Mexican chicken bake: Bake chunks of chicken breast or thigh with taco seasoning, Pace brand enchilada sauce, green onions and low-fat cheese. Serve with ¼ cup black beans or fat free refried beans topped with chopped tomatoes or salsa.    - Dread frozen meatballs, simmered in Classico Marinara sauce. Different flavors of salsa or spaghetti sauce create different dishes! Sprinkle with parmesan cheese. Serve with grilled or steamed veggies, or a dark green salad.    - Simmer boneless skinless chicken thigh chunks in Classico Marinara sauce or roasted salsa until tender with chopped onion, bell pepper, garlic, mushrooms, spinach, etc.     - Hamburger or veggie burger, without the bun, dressed the way you like. Served with grilled or steamed veggies.    - Eggplant parmesan: Bake slices of eggplant at 350 degrees for 15 minutes. Layer tomato sauce, sliced eggplant and low-fat mozzarella cheese in a baking dish and cover with  foil. Bake 30-40 more minutes or until bubbly. Uncover and bake at 400 degrees for about 15 more minutes, or until top is slightly crisp.    - Fish tacos: grilled/baked white fish, wrapped in Edvin lettuce leaf, topped with salsa, shredded low-fat cheese, and light coleslaw.    - Chicken karen: Sprinkle chicken w/ 1 tsp of hidden valley ranch dip mix. Then grill chicken and top with black beans, salsa and 1 tsp fat free sour cream.     - Cauliflower pizza crust: Use cauliflower as crust (see recipe on pinterest, no flour!). Top w/ low fat cheese, turkey pepperoni and veggies and bake again    - chicken or turkey crust pizza: use ground chicken or turkey instead of cauliflower, spread in [place] and bake at 350 for about 20-30 minutes(may want to add garlic, black pepper, oregano and other herbs to ground meat mixture).  Remove and top w/ low fat cheese, turkey pepperoni and veggies and bake again for another 10 minutes or until cheese is browned.     Snacks: (100-200 calories; >5g protein)    - 1 low-fat cheese stick with 8 cherry tomatoes or 1 serving fresh fruit  - 4 thin slices fat-free turkey breast and 1 slice low-fat cheese  - 4 thin slices fat-free honey ham with wedge of melon  - 6-8 edamame pods (equivalent to about 1/4 cup edamame without pods).   - 1/4 cup unsalted nuts with ½ cup fruit  - 6-oz container Dannon Light n Fit vanilla yogurt, topped with 1oz unsalted nuts         - apple, celery or baby carrots spread with 2 Tbsp PB2  - apple slices with 1 oz slice low-fat cheese  - Apple slices dipped in 2 Tbsp of PB2  - celery, cucumber, bell pepper or baby carrots dipped in ¼ cup hummus bean spread or light spinach and artichoke dip (*recipe in lunch section)  - celery, cucumber, baby carrots dipped in high protein greek yogurt (Mix 16 oz plain greek yogurt + 1 packet of hidden valley ranch dip mix)  - Misael Links Beef Steak - 14g protein! (similar to beef jerky)  - 2 wedges Laughing Cow - Light Herb  & Garlic Cheese with sliced cucumber or green bell pepper  - 1/2 cup low-fat cottage cheese with ¼ cup fruit or ¼ cup salsa  - RTD Protein drinks: Atkins, Low Carb Slim Fast, EAS light, Muscle Milk Light, etc.  - Homemade Protein drinks: GNC Soy95, Isopure, Nectar, UNJURY, Whey Gourmet, etc. Mix 1 scoop powder with 8oz skim/1% milk or light soymilk.  - Protein bars: Atkins, EAS, Pure Protein, Think Thin, Detour, etc. Must have 0-4 grams sugar - Read the label.    Takeout Options: No more than twice/week  Deli - Salads (no pasta or rice), meats, cheeses. Roasted chicken. Lox (salmon)    Mexican - Platters which don't include tortillas, chips, or rice. Go easy on the beans. Example: Fajitas without the tortillas. Ask the  not to bring chips to the table if they are too tempting.    Greek - Meat or fish and vegetable, but no bread or rice. Including hummus, baba ganoush, etc, is OK. Most sit-down Greek restaurants can provide you with cucumber slices for dipping instead of rao bread.    Fast Food (Avoid as much as possible) - Salads (no croutons and limit salad dressing to 2 tbsp), grilled chicken sandwich without the bun and ask for no chatman. Lavernes low fat chili or Taco Bell pintos and cheese.    BBQ - The meats are fine if you ask for sauces on the side, but most of the traditional side dishes are loaded with carbs. Ricki slaw, baked beans and BBQ sauce are typically made with sugar.    Chinese - Nothing deep-fried, no rice or noodles. Many Chinese sauces have starch and sugar in them, so you'll have to use your judgement. If you find that these sauces trigger cravings, or cause Dumping, you can ask for the sauce to be made without sugar or just use soy sauce.    Snacks: (100-200 calories; >5g protein)    - 1 low-fat cheese stick with 8 cherry tomatoes or 1 serving fresh fruit  - 4 thin slices fat-free turkey breast and 1 slice low-fat cheese  - 4 thin slices fat-free honey ham with wedge of melon  - 2 slices  "of turkey sandoval  - Boiled eggs (can buy at [employer] already boiled w/ shell removed)  - for convenience,  [place] read, snack, go (deli meat and cheese rolls)  - P3 packets (Protein packs w/ cheese, nuts, lean deli meat)  - MHP Fit and Lean Protein Pudding (find at [employer] - per 1 cup serving = 100 calories, 15 g protein, 0 g sugar)  - 6-8 edamame pods (equivalent to about 1/4 cup edamame without pods).   - 1/4 cup unsalted nuts with ½ cup fruit  - 6-oz container Dannon Light n Fit vanilla yogurt, topped with 1oz unsalted nuts         - apple, celery or baby carrots spread with 2 Tbsp PB2  - apple slices with 1 oz slice low-fat cheese  - Apple slices dipped in 2 Tbsp of PB2  - 2 Tbsp PB2 mixed in light or greek yogurt or sugar-free pudding  - celery, cucumber, bell pepper or baby carrots dipped in ¼ cup hummus bean spread   - celery, cucumber, baby carrots dipped in high protein greek yogurt (Mix 16 oz plain greek yogurt + 1 packet of hidden valley ranch dip mix)  - Misael Links Beef Steak - 14g protein! (similar to beef jerky but very lean)  - 2 wedges Laughing Cow - Light Herb & Garlic Cheese with sliced cucumber or green bell pepper  - 1/2 cup low-fat cottage cheese with ¼ cup fruit or ¼ cup salsa  - 1/2 cup low fat cottage cheese with 10-15 cherry tomatoes  - 8 oz glass of FAIRLIFE fat free milk (13 g protein)  - 8 oz glass of FAIRLIFE fat free milk + 1 packet of sugar-free hot cocoa  - Add Atkins advantage Cafe Caramel shake to decaf coffee. Serve hot or blend with ice for "frappaccino" like drink  - RTD Protein drinks: Atkins, Low Carb Slim Fast, EAS light, Muscle Milk Light, etc.  - Homemade Protein drinks: GNC Soy95, Isopure, Nectar, UNJURY, Whey Gourmet, etc. Mix 1 scoop powder with 8oz skim/1% milk or light soymilk.  - Protein bars: Atkins, EAS, Pure Protein,  Quest, Think Thin, Detour, etc. Must have 0-4 grams sugar - Read the label.    ** Be CREATIVE. You can always snack on bites of grilled " chicken or tuna salad made with low fat chatman, if needed!

## 2019-02-19 ENCOUNTER — PATIENT MESSAGE (OUTPATIENT)
Dept: BARIATRICS | Facility: CLINIC | Age: 39
End: 2019-02-19

## 2019-02-21 ENCOUNTER — PATIENT MESSAGE (OUTPATIENT)
Dept: BARIATRICS | Facility: CLINIC | Age: 39
End: 2019-02-21

## 2019-03-01 ENCOUNTER — PATIENT MESSAGE (OUTPATIENT)
Dept: BARIATRICS | Facility: CLINIC | Age: 39
End: 2019-03-01

## 2019-03-01 DIAGNOSIS — K21.9 GASTROESOPHAGEAL REFLUX DISEASE, ESOPHAGITIS PRESENCE NOT SPECIFIED: Primary | ICD-10-CM

## 2019-03-01 RX ORDER — OMEPRAZOLE 40 MG/1
40 CAPSULE, DELAYED RELEASE ORAL EVERY MORNING
Qty: 90 CAPSULE | Refills: 1 | Status: SHIPPED | OUTPATIENT
Start: 2019-03-01 | End: 2019-07-16 | Stop reason: SDUPTHER

## 2019-03-10 RX ORDER — TRAMADOL HYDROCHLORIDE 50 MG/1
50 TABLET ORAL EVERY 12 HOURS PRN
Qty: 20 TABLET | Refills: 0 | Status: CANCELLED | OUTPATIENT
Start: 2019-03-10

## 2019-03-12 ENCOUNTER — PATIENT MESSAGE (OUTPATIENT)
Dept: INTERNAL MEDICINE | Facility: CLINIC | Age: 39
End: 2019-03-12

## 2019-03-12 ENCOUNTER — TELEPHONE (OUTPATIENT)
Dept: INTERNAL MEDICINE | Facility: CLINIC | Age: 39
End: 2019-03-12

## 2019-03-12 RX ORDER — TRAMADOL HYDROCHLORIDE 50 MG/1
50 TABLET ORAL EVERY 12 HOURS PRN
Qty: 14 TABLET | Refills: 0 | Status: SHIPPED | OUTPATIENT
Start: 2019-03-12 | End: 2019-05-08

## 2019-03-12 NOTE — TELEPHONE ENCOUNTER
Prescription faxed to [facility]. Fax confirmation received. Patient notified via patient portal.

## 2019-03-12 NOTE — TELEPHONE ENCOUNTER
Anat desire refill of Tramadol. Last prescription written 12/18. Last acute visit 1/19. Patient states liquid Tylenol is not helping with lower back pain. States lower back pain is 6/10 after taking liquid Tylenol as prescribed. Unable to pend order due to Opoid warning. Please advise. Requesting response via patient portal.   Patient Active Problem List   Diagnosis    Dyspepsia    CHRISTINE (generalized anxiety disorder)    Panic disorder    Major depressive disorder, recurrent, in full remission    IBS (irritable bowel syndrome)    Psychophysiological insomnia    Family history of colon cancer    Family history of cervical cancer    Hiatal hernia    DDD (degenerative disc disease), lumbar    Fatty liver    Right arm pain    Sleep arousal disorder    Chronic back pain    GERD (gastroesophageal reflux disease)     Prior to Admission medications    Medication Sig Start Date End Date Taking? Authorizing Provider   ALPRAZolam (XANAX) 0.5 MG tablet Take 1 tablet (0.5 mg total) by mouth 2 (two) times daily as needed for anxiety. 1/7/19 3/10/19  Yuriy Vaz MD   amitriptyline (ELAVIL) 75 MG tablet Take 1 tablet (75 mg total) by mouth every evening. 1/7/19   Yuriy Vaz MD   b complex vitamins capsule Take 1 capsule by mouth once daily.    Historical Provider, MD   BIOTIN ORAL Take 200 mg by mouth.    Historical Provider, MD   buPROPion (WELLBUTRIN) 100 MG tablet Take 1 tablet (100 mg total) by mouth 3 (three) times daily. 1/7/19   Yuriy Vaz MD   CALCIUM CITRATE ORAL Take 1 tablet by mouth 3 (three) times daily.    Historical Provider, MD   dicyclomine (BENTYL) 20 mg tablet TAKE 1 TABLET FOUR TIMES A DAY BEFORE MEALS AND NIGHTLY 6/27/17   Mary Dickinson NP   eszopiclone 3 mg Tab Take 1 tablet (3 mg total) by mouth nightly as needed. 2/7/18   Yuriy Vaz MD   fluconazole (DIFLUCAN) 200 MG Tab Take one tablet (200 mg) as needed for vulvar irritation.May take one tablet  every 3 days as needed.Do not exceed 3 tablets consecutively. 12/18/18   Kay Corona MD   lubiprostone (AMITIZA) 8 MCG Cap Take 1 capsule (8 mcg total) by mouth 2 (two) times daily with meals. 11/29/17   Azalia Muir NP   multivitamin with minerals tablet Take 1 tablet by mouth once daily.    Historical Provider, MD   omeprazole (PRILOSEC) 40 MG capsule Take 1 capsule (40 mg total) by mouth every morning. Open capsule and take with apple sauce 3/1/19   Subhash Palmer PA-C   ondansetron (ZOFRAN-ODT) 8 MG TbDL Dissolve 1 tablet (8 mg total) by mouth every 6 (six) hours as needed. 11/9/18   Kale Renee Jr., MD   pediatric multivit-iron-min (FLINTSTONES COMPLETE, IRON,) Chew Take 1 tablet by mouth 2 (two) times daily.    Historical Provider, MD   polyethylene glycol 3350 (MIRALAX ORAL) Take by mouth.    Historical Provider, MD   traMADol (ULTRAM) 50 mg tablet Take 1 tablet (50 mg total) by mouth every 12 (twelve) hours as needed for pain. 12/5/18   Azalia Muir NP

## 2019-03-12 NOTE — TELEPHONE ENCOUNTER
----- Message from Fannie Pruitt sent at 3/12/2019  1:36 PM CDT -----  Contact: Self  Anat Parks  MRN: [id number]  : 1980  PCP: Azalia Muir  Home Phone      [phone]  Work Phone      Not on file.  [employer]          [phone]    MESSAGE:     Needs RX  traMADol (ULTRAM) 50 mg tablet    Pharmacy: [facility]    Phone: [phone]

## 2019-03-13 ENCOUNTER — OFFICE VISIT (OUTPATIENT)
Dept: PSYCHIATRY | Facility: CLINIC | Age: 39
End: 2019-03-13
Payer: OTHER GOVERNMENT

## 2019-03-13 VITALS
HEART RATE: 70 BPM | BODY MASS INDEX: 27.17 KG/M2 | WEIGHT: 179.25 LBS | DIASTOLIC BLOOD PRESSURE: 68 MMHG | SYSTOLIC BLOOD PRESSURE: 118 MMHG | RESPIRATION RATE: 17 BRPM | HEIGHT: 68 IN

## 2019-03-13 DIAGNOSIS — F41.0 PANIC DISORDER: ICD-10-CM

## 2019-03-13 DIAGNOSIS — F51.04 PSYCHOPHYSIOLOGICAL INSOMNIA: ICD-10-CM

## 2019-03-13 DIAGNOSIS — F41.1 GAD (GENERALIZED ANXIETY DISORDER): Primary | ICD-10-CM

## 2019-03-13 DIAGNOSIS — F33.42 MAJOR DEPRESSIVE DISORDER, RECURRENT, IN FULL REMISSION: ICD-10-CM

## 2019-03-13 PROCEDURE — 99214 PR OFFICE/OUTPT VISIT, EST, LEVL IV, 30-39 MIN: ICD-10-PCS | Mod: S$PBB,,, | Performed by: PSYCHIATRY & NEUROLOGY

## 2019-03-13 PROCEDURE — 90833 PSYTX W PT W E/M 30 MIN: CPT | Mod: S$PBB,,, | Performed by: PSYCHIATRY & NEUROLOGY

## 2019-03-13 PROCEDURE — 99999 PR PBB SHADOW E&M-EST. PATIENT-LVL III: ICD-10-PCS | Mod: PBBFAC,,, | Performed by: PSYCHIATRY & NEUROLOGY

## 2019-03-13 PROCEDURE — 99213 OFFICE O/P EST LOW 20 MIN: CPT | Mod: PBBFAC | Performed by: PSYCHIATRY & NEUROLOGY

## 2019-03-13 PROCEDURE — 90833 PR PSYCHOTHERAPY W/PATIENT W/E&M, 30 MIN (ADD ON): ICD-10-PCS | Mod: S$PBB,,, | Performed by: PSYCHIATRY & NEUROLOGY

## 2019-03-13 PROCEDURE — 90833 PSYTX W PT W E/M 30 MIN: CPT | Mod: PBBFAC | Performed by: PSYCHIATRY & NEUROLOGY

## 2019-03-13 PROCEDURE — 99214 OFFICE O/P EST MOD 30 MIN: CPT | Mod: S$PBB,,, | Performed by: PSYCHIATRY & NEUROLOGY

## 2019-03-13 PROCEDURE — 99999 PR PBB SHADOW E&M-EST. PATIENT-LVL III: CPT | Mod: PBBFAC,,, | Performed by: PSYCHIATRY & NEUROLOGY

## 2019-03-13 RX ORDER — BUPROPION HYDROCHLORIDE 300 MG/1
300 TABLET ORAL DAILY
Qty: 30 TABLET | Refills: 0 | Status: SHIPPED | OUTPATIENT
Start: 2019-03-13 | End: 2019-04-08 | Stop reason: SDUPTHER

## 2019-03-13 RX ORDER — ALPRAZOLAM 1 MG/1
1 TABLET ORAL DAILY PRN
Qty: 30 TABLET | Refills: 0 | Status: SHIPPED | OUTPATIENT
Start: 2019-03-13 | End: 2019-04-10

## 2019-03-13 NOTE — PROGRESS NOTES
"Outpatient Psychiatry Follow-Up Visit (MD/NP)    3/13/2019    Clinical Status of Patient:  Outpatient (Ambulatory)    Chief Complaint:  Anat Parks is a 39 y.o. female who presents today for follow-up of depression and anxiety.  Met with patient and spouse.      Interval History and Content of Current Session:  Interim Events/Subjective Report/Content of Current Session:   The patient was seen and her chart reviewed. Reviewed notes from Berna Robertson RD at 2/18/2019 12:26 PM; Subhash Palmer PA-C at 2/18/2019  9:07 AM; Azalia Muir NP at 1/30/2019  8:42 AM.    She has been compliant with treatment. She had no adverse reactions or side effects.  She tolerated the previous medication changes well without complications.     No new psychosocial stressors were presented today. Her marriage is going well. She continues to deal with family struggles (balancing work/home/life responsibilities), but this has been well managed. Her  previously had a successful tx with the spinal stimulator for his chronic pain (some recent adjustments were made; unknown benefit)- he is doing better with his pain overall, but he has continued PTSD-related issues; he is also trying to lose weight (may see a bariatric specialist soon). 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 Her youngest son is attending [id number] and doing well. Her oldest left the [employer]- he is now employed and continues doing well.      She had no new medical issues since she was last seen. She and her family continue working on their diet and have been losing weight- she was approved for and completed the gastric by-pass surgery (it went well); nutritional markers remain stable.  She previously had had a sleep study- was diagnosed with mild ERICH and recommended to loose weight to correct it (f/u study to be done in the futre. .     "I've been more angry."  Overall, symptoms remain " remains improved aside form irritability and periodic panic attacks.    Improved/Controlled Symptoms of Depression: no diminished mood, no loss of interest/anhedonia; no irritability, no diminished energy, no change in sleep (see below; improved overall), no change in appetite, no diminished concentration or cognition or indecisiveness, no PMA/R, no excessive guilt, denied hopelessness/worthlessness, no suicidal ideations    Controlled Sleep disturbance: no trouble with initiation (relieved with medications- may happen occasionally; may be getting some tolerance to the lunesta), no issues maintenance, no early morning awakening with inability to return to sleep; getting about 8-12 hours per night on average. She had not required  lunesta since the last appointment     No Suicidal/Homicidal ideations: no active/passive ideations, organized plans, or future intentions; no past attempts.     Some Symptoms of CHRISTINE: some excessive anxiety/worry/fear, no restlessness, no fatigue, no poor concentration, +irritability, no muscle tension, no sleep disturbance     Increased symptoms of Panic Disorder: she is now averaging about 1-2 panics attack per 1 week- remain triggered but somewhat difficult to manage. They are managed with xanax.    No Symptoms of sexual disorders: no decrease in libido/desire (better); no issues and orgasmic (better); no pain    Overall, her IBS persists but is controlled; Back pain is better but persists.     Weight: she is working on dieting to lose weight. Her weight on 10/31/17 was 254 lbs; weight on 2/7/18 was 240 lbs; weight on 4/30 was 241 lbs and is 223 lbs on 10/8/18. It was 190 lbs last appointment (1/7/19). She is 179 lbs today      Previous medication trials include luvox (increased irritability).    PSYCHOTHERAPY ADD-ON +[id number]   30 (16-37*) minutes    Time: 16 minutes  Participants: Met with patient    Therapeutic Intervention Type: insight oriented psychotherapy, behavior modifying  psychotherapy, supportive psychotherapy  Why chosen therapy is appropriate versus another modality: relevant to diagnosis, patient responds to this modality, evidence based practice    Target symptoms: depression, anxiety   Primary focus: anxiety, psychosocial stressors  Psychotherapeutic techniques: supportive techniques; psycho-education    Outcome monitoring methods: self-report, observation    Patient's response to intervention:  The patient's response to intervention is accepting.    Progress toward goals:  The patient's progress toward goals is good.              Review of Systems   · PSYCHIATRIC: Pertinant items are noted in the narrative.  · CONSTITUTIONAL: No weight gain or loss.   · MUSCULOSKELETAL: No pain or stiffness of the joints.  · NEUROLOGIC: No weakness, sensory changes, seizures, confusion, memory loss, tremor or other abnormal movements.  · ENDOCRINE: No polydipsia or polyuria.  · INTEGUMENTARY: No rashes or lacerations.  · EYES: No exophthalmos, jaundice or blindness.  · ENT: No dizziness, tinnitus or hearing loss.  · RESPIRATORY: No shortness of breath.  · CARDIOVASCULAR: No tachycardia or chest pain.  · GASTROINTESTINAL: No nausea, vomiting, pain, constipation or diarrhea.  · GENITOURINARY: No frequency, dysuria or sexual dysfunction.  · HEMATOLOGIC/LYMPHATIC: No excessive bleeding, prolonged or excessive bleeding after dental extraction/injury.  · ALLERGIC/IMMUNOLOGIC: No allergic response to materials, foods or animals at this time.    Past Medical, Family and Social History: The patient's past medical, family and social history have been reviewed and updated as appropriate within the electronic medical record - see encounter notes.    Compliance: yes    Side effects: None    Risk Parameters:  Patient reports no suicidal ideation  Patient reports no homicidal ideation  Patient reports no self-injurious behavior  Patient reports no violent behavior           Exam (detailed: at least 9  "elements; comprehensive: all 15 elements)   Constitutional  Vitals:  Most recent vital signs, dated less than 90 days prior to this appointment, were reviewed.   Vitals:    03/13/19 0952   BP: 118/68   Pulse: 70   Resp: 17   Weight: 81.3 kg (179 lb 3.7 oz)   Height: 5' 8" (1.727 m)     Body mass index is 27.25 kg/m².         General:  unremarkable, age appropriate, casually dressed, neatly groomed, obese     Musculoskeletal  Muscle Strength/Tone:  no spasicity, no rigidity, no dyskinesia, no dystonia, no tremor   Gait & Station:  non-ataxic     Psychiatric  Speech:  no latency; no press, spontaneous   Mood & Affect:  steady "more irritable"   congruent and appropriate, mood-congruent, full   Thought Process:  normal and logical, goal-directed   Associations:  intact   Thought Content:  normal, no suicidality, no homicidality, delusions, or paranoia   Insight:  intact, has awareness of illness   Judgement: behavior is adequate to circumstances, age appropriate   Orientation:  person, place, situation, time/date, day of week, month of year, year   Memory: intact for content of interview, able to remember recent events- yes, able to remember remote events- yes   Language: grossly intact, able to name, able to repeat   Attention Span & Concentration:  able to focus, completed tasks   Fund of Knowledge:  intact and appropriate to age and level of education, familiar with aspects of current personal life, 4 of 4 recent presidents     Assessment and Diagnosis   Status/Progress: Based on the examination today, the patient's problem(s) is/are inadequately controlled.  New problems have not been presented today.   Co-morbidities are not complicating management of the primary condition.  There are no active rule-out diagnoses for this patient at this time.     General Impression:  MDD, recurrent, moderate, in full remission  CHRISTINE  Panic Disorder without agoraphobia   Insomnia    IBS  GERD  Ovarian cysts  Overweight, s/p gastric " bypass      Intervention/Counseling/Treatment Plan   Treatment Plan/Recommendations:     Medications:  -Change Wellbutrin to  mg po qDay for depression and off-label CHRISTINE (pt had better efficacy with XL version; no signs of malabsorption; tolerating full pills at this time) .   -Continue Elavil 75 mg po q HS for adjunctive depression, anxiety and IBS  -Change Xanax to 1 mg po qDay prn anxiety- pt feels the 0.5 is not potent enough to cover symptoms, and she only needs about 4-5 per week at this time (does not need BID dosing at this time)  -Continue Probiotic for adjunctive depression     Discussed diagnosis, risks and benefits of proposed treatment vs alternative treatments vs no treatment, and potential side effects of these treatments.  The patient expresses understanding of the above and displays the capacity to agree with this treatment given said understanding.  Patient also agrees that, currently, the benefits outweigh the risks and would like to pursue treatment at this time.    Therapy:  Pt is not interested in regular scheduled appointments at this time; psychotherapy provided today    Counseled:  Counseled on illness and tx options as well as exercise for MH   Counseled on weight loss    Labs:   Labs reviewed    Return to Clinic: 1 month, sooner if needed     Yuriy Vaz MD

## 2019-03-25 ENCOUNTER — PATIENT MESSAGE (OUTPATIENT)
Dept: INTERNAL MEDICINE | Facility: CLINIC | Age: 39
End: 2019-03-25

## 2019-03-25 DIAGNOSIS — Z98.890 S/P GASTRIC SURGERY: Primary | ICD-10-CM

## 2019-04-01 ENCOUNTER — TELEPHONE (OUTPATIENT)
Dept: BARIATRICS | Facility: CLINIC | Age: 39
End: 2019-04-01

## 2019-04-01 NOTE — TELEPHONE ENCOUNTER
----- Message from Francesco Patten sent at 4/1/2019  3:48 PM CDT -----  Reason for call:Pt calling to schedule labs.        Communication Preference:[phone]    Additional Information:

## 2019-04-08 RX ORDER — BUPROPION HYDROCHLORIDE 300 MG/1
300 TABLET ORAL DAILY
Qty: 30 TABLET | Refills: 0 | Status: SHIPPED | OUTPATIENT
Start: 2019-04-08 | End: 2019-05-08 | Stop reason: SDUPTHER

## 2019-04-08 NOTE — TELEPHONE ENCOUNTER
Patient had to reschedule due to work. States she is doing well. Patient also wanting to let you know she has went back to Xanax 0.5 mg instead of 1 mg. Patient is needing a refill of Wellbutrin 300 mg.

## 2019-04-10 RX ORDER — ALPRAZOLAM 0.5 MG/1
0.5 TABLET ORAL 2 TIMES DAILY PRN
Qty: 60 TABLET | Refills: 0 | Status: SHIPPED | OUTPATIENT
Start: 2019-04-10 | End: 2019-05-08 | Stop reason: SDUPTHER

## 2019-04-10 RX ORDER — ALPRAZOLAM 0.5 MG/1
0.5 TABLET ORAL 2 TIMES DAILY PRN
Qty: 60 TABLET | Refills: 0 | OUTPATIENT
Start: 2019-04-10 | End: 2019-05-10

## 2019-04-13 RX ORDER — ALPRAZOLAM 0.5 MG/1
0.5 TABLET ORAL 2 TIMES DAILY PRN
Qty: 60 TABLET | Refills: 5 | OUTPATIENT
Start: 2019-04-13 | End: 2019-05-13
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
## 2019-05-08 ENCOUNTER — OFFICE VISIT (OUTPATIENT)
Dept: PSYCHIATRY | Facility: CLINIC | Age: 39
End: 2019-05-08
Payer: OTHER GOVERNMENT

## 2019-05-08 VITALS
WEIGHT: 167.88 LBS | HEART RATE: 70 BPM | SYSTOLIC BLOOD PRESSURE: 121 MMHG | BODY MASS INDEX: 25.44 KG/M2 | HEIGHT: 68 IN | DIASTOLIC BLOOD PRESSURE: 76 MMHG | RESPIRATION RATE: 17 BRPM

## 2019-05-08 DIAGNOSIS — F33.42 MAJOR DEPRESSIVE DISORDER, RECURRENT, IN FULL REMISSION: ICD-10-CM

## 2019-05-08 DIAGNOSIS — F51.04 PSYCHOPHYSIOLOGICAL INSOMNIA: ICD-10-CM

## 2019-05-08 DIAGNOSIS — F41.0 PANIC DISORDER: ICD-10-CM

## 2019-05-08 DIAGNOSIS — F41.1 GAD (GENERALIZED ANXIETY DISORDER): Primary | ICD-10-CM

## 2019-05-08 PROCEDURE — 99999 PR PBB SHADOW E&M-EST. PATIENT-LVL III: CPT | Mod: PBBFAC,,, | Performed by: PSYCHIATRY & NEUROLOGY

## 2019-05-08 PROCEDURE — 99214 PR OFFICE/OUTPT VISIT, EST, LEVL IV, 30-39 MIN: ICD-10-PCS | Mod: S$PBB,,, | Performed by: PSYCHIATRY & NEUROLOGY

## 2019-05-08 PROCEDURE — 99999 PR PBB SHADOW E&M-EST. PATIENT-LVL III: ICD-10-PCS | Mod: PBBFAC,,, | Performed by: PSYCHIATRY & NEUROLOGY

## 2019-05-08 PROCEDURE — 99213 OFFICE O/P EST LOW 20 MIN: CPT | Mod: PBBFAC | Performed by: PSYCHIATRY & NEUROLOGY

## 2019-05-08 PROCEDURE — 99214 OFFICE O/P EST MOD 30 MIN: CPT | Mod: S$PBB,,, | Performed by: PSYCHIATRY & NEUROLOGY

## 2019-05-08 RX ORDER — ESZOPICLONE 3 MG/1
3 TABLET, FILM COATED ORAL NIGHTLY PRN
Qty: 90 TABLET | Refills: 1 | Status: SHIPPED | OUTPATIENT
Start: 2019-05-08 | End: 2020-01-23 | Stop reason: SDUPTHER

## 2019-05-08 RX ORDER — ALPRAZOLAM 0.5 MG/1
0.5 TABLET ORAL 2 TIMES DAILY PRN
Qty: 60 TABLET | Refills: 5 | Status: SHIPPED | OUTPATIENT
Start: 2019-05-08 | End: 2019-11-11 | Stop reason: DRUGHIGH

## 2019-05-08 RX ORDER — AMITRIPTYLINE HYDROCHLORIDE 75 MG/1
75 TABLET ORAL NIGHTLY
Qty: 90 TABLET | Refills: 1 | Status: SHIPPED | OUTPATIENT
Start: 2019-05-08 | End: 2019-11-11

## 2019-05-08 RX ORDER — BUPROPION HYDROCHLORIDE 300 MG/1
300 TABLET ORAL DAILY
Qty: 90 TABLET | Refills: 1 | Status: SHIPPED | OUTPATIENT
Start: 2019-05-08 | End: 2019-10-08 | Stop reason: SDUPTHER

## 2019-05-08 NOTE — PROGRESS NOTES
Outpatient Psychiatry Follow-Up Visit (MD/NP)    5/8/2019    Clinical Status of Patient:  Outpatient (Ambulatory)    Chief Complaint:  Anat Parks is a 39 y.o. female who presents today for follow-up of depression and anxiety.  Met with patient and spouse.      Interval History and Content of Current Session:  Interim Events/Subjective Report/Content of Current Session:   The patient was seen and her chart reviewed. Reviewed notes from Berna Robertson RD at 2/18/2019 12:26 PM; Subhash Palmer PA-C at 2/18/2019  9:07 AM; Azalia Muir NP at 1/30/2019  8:42 AM; no new notes were charted in Epic since last seen.    She has been compliant with treatment. She had no adverse reactions or side effects.  She tolerated the previous medication changes well without complications.     No new psychosocial stressors were presented today. Her marriage is going well. She continues to deal with family struggles (balancing work/home/life responsibilities), but this has been well managed. Her  previously had a successful tx with the spinal stimulator for his chronic pain (some recent adjustments were made; unknown benefit)- he is doing better with his pain overall, but he has continued PTSD-related issues; he is also trying to lose weight (seeing a bariatric specialist soon; doing well with weight loss goals). 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 Her youngest son is attending [employer] and is doing well; also has an internship. Her oldest left the [employer]- he is now employed and continues doing well.      She had no new medical issues since she was last seen. She and her family continue working on their diet and have been losing weight- she was approved for and completed the gastric by-pass surgery (it went well); nutritional markers remain stable.  She previously had had a sleep study- was diagnosed with mild ERICH and recommended to  "loose weight to correct it (f/u study to be done in the future).     "I've been good"  Overall, symptoms remain remains improved with improved symptoms of irritability and panic attacks.    Improved/Controlled Symptoms of Depression: no diminished mood, no loss of interest/anhedonia; no irritability, no diminished energy, no change in sleep (see below; improved overall), no change in appetite, no diminished concentration or cognition or indecisiveness, no PMA/R, no excessive guilt, denied hopelessness/worthlessness, no suicidal ideations    Controlled Sleep disturbance: no trouble with initiation (relieved with medications- may happen occasionally; may be getting some tolerance to the lunesta), no issues maintenance, no early morning awakening with inability to return to sleep; getting about 8-12 hours per night on average. She had not required  lunesta since the last appointment     No Suicidal/Homicidal ideations: no active/passive ideations, organized plans, or future intentions; no past attempts.     Improving Symptoms of CHRISTINE: no excessive anxiety/worry/fear, no restlessness, no fatigue, no poor concentration, no irritability, no muscle tension, no sleep disturbance     Improved symptoms of Panic Disorder: less panics attacks per 1 week- remain triggered but somewhat difficult to manage. They are managed with xanax.    No Symptoms of sexual disorders: no decrease in libido/desire (better); no issues and orgasmic (better); no pain    Overall, her IBS persists but is controlled; Back pain is better but persists.     Weight: she is working on dieting to lose weight. Her weight on 10/31/17 was 254 lbs; weight on 2/7/18 was 240 lbs; weight on 4/30/18 was 241 lbs and is 223 lbs on 10/8/18. It was 179 lbs at the last appointment. She is 167 lbs today      Previous medication trials include luvox (increased irritability).            Review of Systems   · PSYCHIATRIC: Pertinant items are noted in the " "narrative.  · CONSTITUTIONAL: No weight gain or loss.   · MUSCULOSKELETAL: No pain or stiffness of the joints.  · NEUROLOGIC: No weakness, sensory changes, seizures, confusion, memory loss, tremor or other abnormal movements.  · ENDOCRINE: No polydipsia or polyuria.  · INTEGUMENTARY: No rashes or lacerations.  · EYES: No exophthalmos, jaundice or blindness.  · ENT: No dizziness, tinnitus or hearing loss.  · RESPIRATORY: No shortness of breath.  · CARDIOVASCULAR: No tachycardia or chest pain.  · GASTROINTESTINAL: No nausea, vomiting, pain, constipation or diarrhea.  · GENITOURINARY: No frequency, dysuria or sexual dysfunction.  · HEMATOLOGIC/LYMPHATIC: No excessive bleeding, prolonged or excessive bleeding after dental extraction/injury.  · ALLERGIC/IMMUNOLOGIC: No allergic response to materials, foods or animals at this time.    Past Medical, Family and Social History: The patient's past medical, family and social history have been reviewed and updated as appropriate within the electronic medical record - see encounter notes.    Compliance: yes    Side effects: None    Risk Parameters:  Patient reports no suicidal ideation  Patient reports no homicidal ideation  Patient reports no self-injurious behavior  Patient reports no violent behavior           Exam (detailed: at least 9 elements; comprehensive: all 15 elements)   Constitutional  Vitals:  Most recent vital signs, dated less than 90 days prior to this appointment, were reviewed.   Vitals:    05/08/19 1117   BP: 121/76   Pulse: 70   Resp: 17   Weight: 76.1 kg (167 lb 14.1 oz)   Height: 5' 8" (1.727 m)     Body mass index is 25.53 kg/m².         General:  unremarkable, age appropriate, casually dressed, neatly groomed, obese     Musculoskeletal  Muscle Strength/Tone:  no spasicity, no rigidity, no dyskinesia, no dystonia, no tremor   Gait & Station:  non-ataxic     Psychiatric  Speech:  no latency; no press, spontaneous   Mood & Affect:  steady "more irritable" "   congruent and appropriate, mood-congruent, full   Thought Process:  normal and logical, goal-directed   Associations:  intact   Thought Content:  normal, no suicidality, no homicidality, delusions, or paranoia   Insight:  intact, has awareness of illness   Judgement: behavior is adequate to circumstances, age appropriate   Orientation:  person, place, situation, time/date, day of week, month of year, year   Memory: intact for content of interview, able to remember recent events- yes, able to remember remote events- yes   Language: grossly intact, able to name, able to repeat   Attention Span & Concentration:  able to focus, completed tasks   Fund of Knowledge:  intact and appropriate to age and level of education, familiar with aspects of current personal life, 4 of 4 recent presidents     Assessment and Diagnosis   Status/Progress: Based on the examination today, the patient's problem(s) is/are inadequately controlled.  New problems have not been presented today.   Co-morbidities are not complicating management of the primary condition.  There are no active rule-out diagnoses for this patient at this time.     General Impression:  MDD, recurrent, moderate, in full remission  CHRISTINE  Panic Disorder without agoraphobia   Insomnia    IBS  GERD  Ovarian cysts  Overweight, s/p gastric bypass      Intervention/Counseling/Treatment Plan   Treatment Plan/Recommendations:     Medications:  -Continue Wellbutrin  mg po qDay for depression and off-label CHRISTINE  -Continue Elavil 75 mg po q HS for adjunctive depression, anxiety and IBS  -Resume Xanax 0.5 mg po q Day prn anxiety- pt feels the 0.5 is not potent enough to cover symptoms, and she only needs about 4-5 per week at this time (does not need BID dosing at this time)  -Continue Probiotic for adjunctive depression     Discussed diagnosis, risks and benefits of proposed treatment vs alternative treatments vs no treatment, and potential side effects of these treatments.   The patient expresses understanding of the above and displays the capacity to agree with this treatment given said understanding.  Patient also agrees that, currently, the benefits outweigh the risks and would like to pursue treatment at this time.    Therapy:  Pt is not interested in regular scheduled appointments at this time; psychotherapy provided today    Counseled:  Counseled on illness and tx options as well as exercise for MH   Counseled on weight loss    Labs:   Labs reviewed    Return to Clinic: 1 month, sooner if needed     Yuriy Vaz MD

## 2019-05-24 ENCOUNTER — LAB VISIT (OUTPATIENT)
Dept: LAB | Facility: HOSPITAL | Age: 39
End: 2019-05-24
Attending: SURGERY
Payer: OTHER GOVERNMENT

## 2019-05-24 DIAGNOSIS — Z98.84 STATUS POST GASTRIC BYPASS FOR OBESITY: ICD-10-CM

## 2019-05-24 DIAGNOSIS — E66.01 MORBID OBESITY: ICD-10-CM

## 2019-05-24 LAB
25(OH)D3+25(OH)D2 SERPL-MCNC: 38 NG/ML (ref 30–96)
ALBUMIN SERPL BCP-MCNC: 4.2 G/DL (ref 3.5–5.2)
ALP SERPL-CCNC: 53 U/L (ref 55–135)
ALT SERPL W/O P-5'-P-CCNC: 111 U/L (ref 10–44)
ANION GAP SERPL CALC-SCNC: 8 MMOL/L (ref 8–16)
AST SERPL-CCNC: 48 U/L (ref 10–40)
BASOPHILS # BLD AUTO: 0.02 K/UL (ref 0–0.2)
BASOPHILS NFR BLD: 0.3 % (ref 0–1.9)
BILIRUB SERPL-MCNC: 0.4 MG/DL (ref 0.1–1)
BUN SERPL-MCNC: 16 MG/DL (ref 6–20)
CALCIUM SERPL-MCNC: 9.7 MG/DL (ref 8.7–10.5)
CHLORIDE SERPL-SCNC: 110 MMOL/L (ref 95–110)
CHOLEST SERPL-MCNC: 139 MG/DL (ref 120–199)
CHOLEST/HDLC SERPL: 2.2 {RATIO} (ref 2–5)
CO2 SERPL-SCNC: 23 MMOL/L (ref 23–29)
CREAT SERPL-MCNC: 0.9 MG/DL (ref 0.5–1.4)
DIFFERENTIAL METHOD: ABNORMAL
EOSINOPHIL # BLD AUTO: 0.1 K/UL (ref 0–0.5)
EOSINOPHIL NFR BLD: 1 % (ref 0–8)
ERYTHROCYTE [DISTWIDTH] IN BLOOD BY AUTOMATED COUNT: 15.3 % (ref 11.5–14.5)
EST. GFR  (AFRICAN AMERICAN): >60 ML/MIN/1.73 M^2
EST. GFR  (NON AFRICAN AMERICAN): >60 ML/MIN/1.73 M^2
GLUCOSE SERPL-MCNC: 95 MG/DL (ref 70–110)
HCT VFR BLD AUTO: 40.7 % (ref 37–48.5)
HDLC SERPL-MCNC: 63 MG/DL (ref 40–75)
HDLC SERPL: 45.3 % (ref 20–50)
HGB BLD-MCNC: 13.1 G/DL (ref 12–16)
IRON SERPL-MCNC: 107 UG/DL (ref 30–160)
LDLC SERPL CALC-MCNC: 63.8 MG/DL (ref 63–159)
LYMPHOCYTES # BLD AUTO: 1.9 K/UL (ref 1–4.8)
LYMPHOCYTES NFR BLD: 32.3 % (ref 18–48)
MCH RBC QN AUTO: 27.3 PG (ref 27–31)
MCHC RBC AUTO-ENTMCNC: 32.2 G/DL (ref 32–36)
MCV RBC AUTO: 85 FL (ref 82–98)
MONOCYTES # BLD AUTO: 0.5 K/UL (ref 0.3–1)
MONOCYTES NFR BLD: 8.5 % (ref 4–15)
NEUTROPHILS # BLD AUTO: 3.3 K/UL (ref 1.8–7.7)
NEUTROPHILS NFR BLD: 57.9 % (ref 38–73)
NONHDLC SERPL-MCNC: 76 MG/DL
PLATELET # BLD AUTO: 251 K/UL (ref 150–350)
PMV BLD AUTO: 10.7 FL (ref 9.2–12.9)
POTASSIUM SERPL-SCNC: 4.1 MMOL/L (ref 3.5–5.1)
PROT SERPL-MCNC: 7.3 G/DL (ref 6–8.4)
RBC # BLD AUTO: 4.8 M/UL (ref 4–5.4)
SATURATED IRON: 29 % (ref 20–50)
SODIUM SERPL-SCNC: 141 MMOL/L (ref 136–145)
TOTAL IRON BINDING CAPACITY: 367 UG/DL (ref 250–450)
TRANSFERRIN SERPL-MCNC: 248 MG/DL (ref 200–375)
TRIGL SERPL-MCNC: 61 MG/DL (ref 30–150)
VIT B12 SERPL-MCNC: 777 PG/ML (ref 210–950)
WBC # BLD AUTO: 5.76 K/UL (ref 3.9–12.7)

## 2019-05-24 PROCEDURE — 83540 ASSAY OF IRON: CPT

## 2019-05-24 PROCEDURE — 80061 LIPID PANEL: CPT

## 2019-05-24 PROCEDURE — 84425 ASSAY OF VITAMIN B-1: CPT

## 2019-05-24 PROCEDURE — 36415 COLL VENOUS BLD VENIPUNCTURE: CPT

## 2019-05-24 PROCEDURE — 80053 COMPREHEN METABOLIC PANEL: CPT

## 2019-05-24 PROCEDURE — 82607 VITAMIN B-12: CPT

## 2019-05-24 PROCEDURE — 85025 COMPLETE CBC W/AUTO DIFF WBC: CPT

## 2019-05-24 PROCEDURE — 82306 VITAMIN D 25 HYDROXY: CPT

## 2019-05-27 ENCOUNTER — PATIENT MESSAGE (OUTPATIENT)
Dept: BARIATRICS | Facility: CLINIC | Age: 39
End: 2019-05-27

## 2019-05-27 ENCOUNTER — OFFICE VISIT (OUTPATIENT)
Dept: BARIATRICS | Facility: CLINIC | Age: 39
End: 2019-05-27
Payer: OTHER GOVERNMENT

## 2019-05-27 VITALS
HEART RATE: 109 BPM | WEIGHT: 166.69 LBS | BODY MASS INDEX: 25.26 KG/M2 | HEIGHT: 68 IN | SYSTOLIC BLOOD PRESSURE: 124 MMHG | DIASTOLIC BLOOD PRESSURE: 82 MMHG

## 2019-05-27 DIAGNOSIS — K76.0 FATTY LIVER: ICD-10-CM

## 2019-05-27 DIAGNOSIS — R79.89 ELEVATED LFTS: Primary | ICD-10-CM

## 2019-05-27 DIAGNOSIS — Z98.84 S/P LAPAROSCOPIC SLEEVE GASTRECTOMY: ICD-10-CM

## 2019-05-27 PROCEDURE — 99213 OFFICE O/P EST LOW 20 MIN: CPT | Mod: PBBFAC | Performed by: PHYSICIAN ASSISTANT

## 2019-05-27 PROCEDURE — 99999 PR PBB SHADOW E&M-EST. PATIENT-LVL III: ICD-10-PCS | Mod: PBBFAC,,, | Performed by: PHYSICIAN ASSISTANT

## 2019-05-27 PROCEDURE — 99213 OFFICE O/P EST LOW 20 MIN: CPT | Mod: S$PBB,,, | Performed by: PHYSICIAN ASSISTANT

## 2019-05-27 PROCEDURE — 99999 PR PBB SHADOW E&M-EST. PATIENT-LVL III: CPT | Mod: PBBFAC,,, | Performed by: PHYSICIAN ASSISTANT

## 2019-05-27 PROCEDURE — 99213 PR OFFICE/OUTPT VISIT, EST, LEVL III, 20-29 MIN: ICD-10-PCS | Mod: S$PBB,,, | Performed by: PHYSICIAN ASSISTANT

## 2019-05-27 NOTE — PROGRESS NOTES
BARIATRIC POST-OPERATIVE VISIT:    HPI:  Anat Parks is a 39 y.o. year old female presents for 4 post op visit following s/p gastric sleeve.  she is doing well and tolerating the diet without difficulty.      Pt states that some sometimes vomits ( around 2 times over the past three months) due to either over eating.    Pt states that she is very happy with her results.    Denies: nausea, abdominal pain, fever, chills, dysphagia, chest pain, and shortness of breath.    Review of Systems   Constitutional: Negative for activity change, appetite change, chills and fever.   HENT: Negative for tinnitus.    Respiratory: Negative for cough, choking and shortness of breath.    Cardiovascular: Negative for chest pain and leg swelling.   Gastrointestinal: Negative for abdominal pain, constipation (intermittently), diarrhea, nausea and vomiting.   Genitourinary: Negative for dysuria and hematuria.   Musculoskeletal: Negative for gait problem and myalgias.   Skin: Negative for rash.   Neurological: Negative for dizziness and headaches.   Psychiatric/Behavioral: Negative for suicidal ideas. The patient is not nervous/anxious.        EXERCISE & VITAMINS:  See Bariatric Assessment   Adherent to bariatric vitamin regimen (switched to bariatric advantage for B12 and calcium citrate)     MEDICATIONS/ALLERGIES:  Have been reviewed.      DIET: Regular Bariatric Diet.   Protein oat meal   Egg frittata   Gluten free waffle with sugar free syrup   Peanut butter as a snack   Chicken  Premier and percy bar for snacks  2 shakes ( one after breakfast and one as a snack in between breakfast and lunch)  Powerade zero   6 oz cup of coffee every other day   Steak bits with cheese   Turkey scrambles  Chickpea pasta     See Dietician note from today for a more detailed assessment.      Physical Exam   Constitutional: She is oriented to person, place, and time. She appears well-developed and well-nourished.   HENT:   Head: Normocephalic  and atraumatic.   Eyes: Pupils are equal, round, and reactive to light. Conjunctivae and EOM are normal.   Neck: Normal range of motion. Neck supple. No JVD present. No thyromegaly present.   Cardiovascular: Normal rate, regular rhythm, normal heart sounds and intact distal pulses.   No murmur heard.  Pulmonary/Chest: Effort normal and breath sounds normal. No respiratory distress.   Abdominal: Soft. Bowel sounds are normal. She exhibits no mass. There is no tenderness (WHSS). There is no rebound.   Musculoskeletal: Normal range of motion. She exhibits no edema.   Neurological: She is alert and oriented to person, place, and time.   Skin: Skin is warm and dry. Capillary refill takes less than 2 seconds. No rash noted. No erythema.   Psychiatric: She has a normal mood and affect. Her behavior is normal. Judgment and thought content normal.       ASSESSMENT:  - Morbid obesity s/p sleeve gastrectomy on 11/08/18.  - Co-morbidities: GERD and IBS, dyspepsia, fatty liver   - Good Weight loss, 51# and 85% EWL  - Good Exercise routine  - Good Diet  - Good Vitamin regimen    PLAN:  - F/u with hepatology due to steadily rising liver enzymes   - Anti-Acid medication   - No lifting restrictions  - Miralax daily for constipation  - Emphasized the importance of regular exercise and adherence to bariatric diet to achieve maximum weight loss.  - Encouraged patient to start/continue regular exercise.  - Follow-up with dietician to advance diet.  - Continue daily vitamins and medications.  - RTC as scheduled   - Call the office for any issues.

## 2019-05-27 NOTE — PATIENT INSTRUCTIONS
Meal Ideas for Regular Bariatric Diet  *Recipes and products available at [email or web address]      Breakfast: (15-20g protein)    - Egg white omelet: 2 egg whites or ½ cup Egg Beaters. (Optional proteins: cheese, shrimp, black beans, chicken, sliced turkey) (Optional veggies: tomatoes, salsa, spinach, mushrooms, onions, green peppers, or small slice avocado)     - Egg and sausage: 1 egg or ¼ cup Egg Beaters (any variety), with 1 nila or 2 links of Turkey sausage or Veggie breakfast sausage (Fileboard or Zazum)    - Crust-less breakfast quiche: To make a glass pie dish, mix 4oz part skim Ricotta, 1 cup skim milk, and 2 eggs as your base. Add protein: shredded cheese, sliced lean ham or turkey, turkey sandoval/sausage. Add veggies: tomato, onion, green onion, mushroom, green pepper, spinach, etc.    - Yogurt parfait: Mix 1 - 6oz container Dannon Light N Fit vanilla yogurt, with ¼ cup crushed unsalted nuts    - Cottage cheese and fruit: ½ cup part-skim cottage cheese or ricotta cheese topped with fresh fruit or sugar free preserves     - Romy Humphries's Vanilla Egg custard* (add 2 Tbsp instant coffee granules to make Cappuccino Custard*)    - Hi-Protein café latte (skim milk, decaf coffee, 1 scoop protein powder). Optional to add Sugar free syrup or extract flavoring.    - Breakfast Lox: spread fat free cream cheese on slices of smoked salmon. Serve over scrambled or egg over easy (sauteed with nonstick cookspray) OR on a cucumber slice    - Eggwhich: Scramble or cook 1 large egg over easy using nonstick cookspray. Place between 2 slices of Luxembourger sandoval and low fat cheese.     Lunch: (20-30g protein)    - ½ cup Black bean soup (Homemade or Progresso), with ¼ cup shredded low-fat cheese. Top with chopped tomato or fresh salsa.     - Lean deli turkey breast and low-fat sliced cheese, mustard or light chatman to moisten, rolled up together, or wrapped in a Edvin lettuce leaf    - Chicken salad made from dinner  leftovers, moisten with low-fat salad dressing or light chatman. Also try leftover salmon, shrimp, tuna or boiled eggs. Serve ½ cup over dark green salad    - Fat-free canned refried beans, topped with ¼ cup shredded low-fat cheese. Top with chopped tomato or fresh salsa.     - Greek salad: Top mixed greens with 1-2oz grilled chicken, tomatoes, red onions, 2-3 kalamata olives, and sprinkle lightly with feta cheese. Spritz with Balsamic vinegar to taste.     - Crust-less lunch quiche: To make a glass pie dish, mix 4oz part skim Ricotta, 1 cup skim milk, and 2 eggs as your base. Add protein: shredded cheese, sliced lean ham or turkey, shrimp, chicken. Add veggies: tomato, onion, green onion, mushroom, green pepper, spinach, artichoke, broccoli, etc.    - Pizza bake: spread a  louise leslie mushroom with tomato sauce, low-fat shredded mozzarella and turkey pepperoni or Chelsea sandoval. Add any veggies. Roast for 10-15 minutes, until cheese melted.     - Cucumber crab bites: Spread ¼ cup crab dip (lump crabmeat + light cream cheese and green onions) over sliced cucumber.     - Chicken with light spinach and artichoke dip*: Puree in : 6oz cooked and drained spinach, 2 cloves garlic, 1 can cannelloni beans, ½ cup chopped green onions, 1 can drained artichoke hearts (not marinated in oil), lemon juice and basil. Mix in 2oz chopped up chicken.    Supper: (20-30g protein)    - Serve grilled fish over dark green salad tossed with low-fat dressing, served with grilled asparagus quigley     - Rotisserie chicken salad: served with sliced strawberries, walnuts, fat-free feta cheese crumbles and 1 tbsp Ivys Own Light Raspberry Tyrone Vinaigrette    - Shrimp cocktail: Dip cold boiled shrimp in homemade low-sugar cocktail sauce (1/2 cup Alfa One Carb ketchup, 2 tbsp horseradish, 1/4 tsp hot sauce, 1 tsp Worcestershire sauce, 1 tbsp freshly-squeezed lemon juice). Serve with dark green salad, walnuts, and crumbled blue  cheese drizzled with olive oil and Balsamic vinegar    - Tuna Melt: Spread tuna salad onto 2 thick slices of tomato. Top with low-fat cheese and broil until cheese is melted. May also be made with chicken salad of shrimp salad. [place] Number Two with different types of cheeses.    - Chicken or beef fajitas (no tortilla, rice, beans, chips). Top meat and veggies w/ fresh salsa, fat free sour cream.     - Homemade low-fat Chili using extra lean ground beef or ground turkey. Top with shredded cheese and salsa as desired. May add dollop fat-free sour cream if desired    - Chicken parmesan: Top chicken breast w/ low sugar marinara sauce, mozzarella cheese and bake until chicken reaches 165*.  Serve w/ spaghetti SQUASH or Algerian cut green beans    - Dinner Omelet with shrimp or chicken and onion, green peppers and chives.    - No noodle lasagna: Use sliced zucchini or eggplant in place of noodles.  Layer with part skim ricotta cheese and low sugar meat sauce (use very lean ground beef or ground turkey).    - Mexican chicken bake: Bake chunks of chicken breast or thigh with taco seasoning, Pace brand enchilada sauce, green onions and low-fat cheese. Serve with ¼ cup black beans or fat free refried beans topped with chopped tomatoes or salsa.    - Dread frozen meatballs, simmered in Classico Marinara sauce. Different flavors of salsa or spaghetti sauce create different dishes! Sprinkle with parmesan cheese. Serve with grilled or steamed veggies, or a dark green salad.    - Simmer boneless skinless chicken thigh chunks in Classico Marinara sauce or roasted salsa until tender with chopped onion, bell pepper, garlic, mushrooms, spinach, etc.     - Hamburger or veggie burger, without the bun, dressed the way you like. Served with grilled or steamed veggies.    - Eggplant parmesan: Bake slices of eggplant at 350 degrees for 15 minutes. Layer tomato sauce, sliced eggplant and low-fat mozzarella cheese in a baking dish and cover with  foil. Bake 30-40 more minutes or until bubbly. Uncover and bake at 400 degrees for about 15 more minutes, or until top is slightly crisp.    - Fish tacos: grilled/baked white fish, wrapped in Edvin lettuce leaf, topped with salsa, shredded low-fat cheese, and light coleslaw.    - Chicken karen: Sprinkle chicken w/ 1 tsp of hidden valley ranch dip mix. Then grill chicken and top with black beans, salsa and 1 tsp fat free sour cream.     - Cauliflower pizza crust: Use cauliflower as crust (see recipe on pinterest, no flour!). Top w/ low fat cheese, turkey pepperoni and veggies and bake again    - chicken or turkey crust pizza: use ground chicken or turkey instead of cauliflower, spread in [place] and bake at 350 for about 20-30 minutes(may want to add garlic, black pepper, oregano and other herbs to ground meat mixture).  Remove and top w/ low fat cheese, turkey pepperoni and veggies and bake again for another 10 minutes or until cheese is browned.     Snacks: (100-200 calories; >5g protein)    - 1 low-fat cheese stick with 8 cherry tomatoes or 1 serving fresh fruit  - 4 thin slices fat-free turkey breast and 1 slice low-fat cheese  - 4 thin slices fat-free honey ham with wedge of melon  - 6-8 edamame pods (equivalent to about 1/4 cup edamame without pods).   - 1/4 cup unsalted nuts with ½ cup fruit  - 6-oz container Dannon Light n Fit vanilla yogurt, topped with 1oz unsalted nuts         - apple, celery or baby carrots spread with 2 Tbsp PB2  - apple slices with 1 oz slice low-fat cheese  - Apple slices dipped in 2 Tbsp of PB2  - celery, cucumber, bell pepper or baby carrots dipped in ¼ cup hummus bean spread or light spinach and artichoke dip (*recipe in lunch section)  - celery, cucumber, baby carrots dipped in high protein greek yogurt (Mix 16 oz plain greek yogurt + 1 packet of hidden valley ranch dip mix)  - Misael Links Beef Steak - 14g protein! (similar to beef jerky)  - 2 wedges Laughing Cow - Light Herb  & Garlic Cheese with sliced cucumber or green bell pepper  - 1/2 cup low-fat cottage cheese with ¼ cup fruit or ¼ cup salsa  - RTD Protein drinks: Atkins, Low Carb Slim Fast, EAS light, Muscle Milk Light, etc.  - Homemade Protein drinks: GNC Soy95, Isopure, Nectar, UNJURY, Whey Gourmet, etc. Mix 1 scoop powder with 8oz skim/1% milk or light soymilk.  - Protein bars: Atkins, EAS, Pure Protein, Think Thin, Detour, etc. Must have 0-4 grams sugar - Read the label.    Takeout Options: No more than twice/week  Deli - Salads (no pasta or rice), meats, cheeses. Roasted chicken. Lox (salmon)    Mexican - Platters which don't include tortillas, chips, or rice. Go easy on the beans. Example: Fajitas without the tortillas. Ask the  not to bring chips to the table if they are too tempting.    Greek - Meat or fish and vegetable, but no bread or rice. Including hummus, baba ganoush, etc, is OK. Most sit-down Greek restaurants can provide you with cucumber slices for dipping instead of rao bread.    Fast Food (Avoid as much as possible) - Salads (no croutons and limit salad dressing to 2 tbsp), grilled chicken sandwich without the bun and ask for no chatman. Lavernes low fat chili or Taco Bell pintos and cheese.    BBQ - The meats are fine if you ask for sauces on the side, but most of the traditional side dishes are loaded with carbs. Ricki slaw, baked beans and BBQ sauce are typically made with sugar.    Chinese - Nothing deep-fried, no rice or noodles. Many Chinese sauces have starch and sugar in them, so you'll have to use your judgement. If you find that these sauces trigger cravings, or cause Dumping, you can ask for the sauce to be made without sugar or just use soy sauce.

## 2019-05-29 LAB — VIT B1 BLD-MCNC: 76 UG/L (ref 38–122)

## 2019-06-05 DIAGNOSIS — K58.1 IRRITABLE BOWEL SYNDROME WITH CONSTIPATION: ICD-10-CM

## 2019-06-05 RX ORDER — LUBIPROSTONE 8 UG/1
8 CAPSULE ORAL 2 TIMES DAILY WITH MEALS
Qty: 180 CAPSULE | Refills: 1 | Status: SHIPPED | OUTPATIENT
Start: 2019-06-05 | End: 2019-11-16 | Stop reason: SDUPTHER

## 2019-06-05 NOTE — TELEPHONE ENCOUNTER
Anat desire refill of AMITIZA. Last visit 1/19  Patient Active Problem List   Diagnosis    Dyspepsia    CHRISTINE (generalized anxiety disorder)    Panic disorder    Major depressive disorder, recurrent, in full remission    IBS (irritable bowel syndrome)    Psychophysiological insomnia    Family history of colon cancer    Family history of cervical cancer    Hiatal hernia    DDD (degenerative disc disease), lumbar    Fatty liver    Right arm pain    Sleep arousal disorder    Chronic back pain    GERD (gastroesophageal reflux disease)     Prior to Admission medications    Medication Sig Start Date End Date Taking? Authorizing Provider   ALPRAZolam (XANAX) 0.5 MG tablet Take 1 tablet (0.5 mg total) by mouth 2 (two) times daily as needed for anxiety. 5/8/19 6/21/19  Yuriy Vaz MD   amitriptyline (ELAVIL) 75 MG tablet Take 1 tablet (75 mg total) by mouth every evening. 5/8/19   Yuriy Vaz MD   b complex vitamins capsule Take 1 capsule by mouth once daily.    Historical Provider, MD   BIOTIN ORAL Take 200 mg by mouth once daily.     Historical Provider, MD   buPROPion (WELLBUTRIN XL) 300 MG 24 hr tablet Take 1 tablet (300 mg total) by mouth once daily. 5/8/19 5/7/20  Yuriy Vaz MD   CALCIUM CITRATE ORAL Take 1 tablet by mouth 3 (three) times daily.    Historical Provider, MD   dicyclomine (BENTYL) 20 mg tablet TAKE 1 TABLET FOUR TIMES A DAY BEFORE MEALS AND NIGHTLY 6/27/17   Mary Dickinson, NP   eszopiclone (LUNESTA) 3 mg Tab Take 1 tablet (3 mg total) by mouth nightly as needed. 5/8/19   Yuriy Vaz MD   fluconazole (DIFLUCAN) 200 MG Tab Take one tablet (200 mg) as needed for vulvar irritation.May take one tablet every 3 days as needed.Do not exceed 3 tablets consecutively. 12/18/18   Kay Corona MD   lubiprostone (AMITIZA) 8 MCG Cap Take 1 capsule (8 mcg total) by mouth 2 (two) times daily with meals.  Patient taking differently: Take 8 mcg by mouth 2 (two)  times daily.  11/29/17   Azalia Muir NP   MAGNESIUM ORAL Take 400 mg by mouth 2 (two) times daily.    Historical Provider, MD   omeprazole (PRILOSEC) 40 MG capsule Take 1 capsule (40 mg total) by mouth every morning. Open capsule and take with apple sauce  Patient taking differently: Take 40 mg by mouth 2 (two) times daily before meals. Open capsule and take with apple sauce 3/1/19   Subhash Palmer PA-C   ondansetron (ZOFRAN-ODT) 8 MG TbDL Dissolve 1 tablet (8 mg total) by mouth every 6 (six) hours as needed. 11/9/18   Kale Renee Jr., MD   pediatric multivit-iron-min (FLINTSTONES COMPLETE, IRON,) Chew Take 1 tablet by mouth 2 (two) times daily.    Historical Provider, MD

## 2019-06-11 ENCOUNTER — TELEPHONE (OUTPATIENT)
Dept: INTERNAL MEDICINE | Facility: CLINIC | Age: 39
End: 2019-06-11

## 2019-06-11 NOTE — TELEPHONE ENCOUNTER
Completed PA for Amitiza faxed to [facility] at [phone]. Fax confirmation received. PA form scanned to chart.

## 2019-06-12 NOTE — TELEPHONE ENCOUNTER
Approval faxed to pharmacy at [phone]. Fax confirmation received. Approval scanned to chart.

## 2019-06-19 ENCOUNTER — PATIENT MESSAGE (OUTPATIENT)
Dept: BARIATRICS | Facility: CLINIC | Age: 39
End: 2019-06-19

## 2019-06-24 ENCOUNTER — OFFICE VISIT (OUTPATIENT)
Dept: HEPATOLOGY | Facility: CLINIC | Age: 39
End: 2019-06-24
Payer: OTHER GOVERNMENT

## 2019-06-24 ENCOUNTER — PROCEDURE VISIT (OUTPATIENT)
Dept: HEPATOLOGY | Facility: CLINIC | Age: 39
End: 2019-06-24
Attending: NURSE PRACTITIONER
Payer: OTHER GOVERNMENT

## 2019-06-24 ENCOUNTER — LAB VISIT (OUTPATIENT)
Dept: LAB | Facility: HOSPITAL | Age: 39
End: 2019-06-24
Attending: NURSE PRACTITIONER
Payer: OTHER GOVERNMENT

## 2019-06-24 VITALS
HEART RATE: 90 BPM | DIASTOLIC BLOOD PRESSURE: 73 MMHG | OXYGEN SATURATION: 100 % | WEIGHT: 166.69 LBS | SYSTOLIC BLOOD PRESSURE: 124 MMHG | HEIGHT: 68 IN | BODY MASS INDEX: 25.26 KG/M2

## 2019-06-24 DIAGNOSIS — R74.8 ELEVATED LIVER ENZYMES: ICD-10-CM

## 2019-06-24 DIAGNOSIS — K76.0 FATTY LIVER: ICD-10-CM

## 2019-06-24 DIAGNOSIS — R74.8 ELEVATED LIVER ENZYMES: Primary | ICD-10-CM

## 2019-06-24 LAB
ALBUMIN SERPL BCP-MCNC: 4 G/DL (ref 3.5–5.2)
ALP SERPL-CCNC: 40 U/L (ref 55–135)
ALT SERPL W/O P-5'-P-CCNC: 85 U/L (ref 10–44)
AST SERPL-CCNC: 41 U/L (ref 10–40)
BILIRUB DIRECT SERPL-MCNC: 0.2 MG/DL (ref 0.1–0.3)
BILIRUB SERPL-MCNC: 0.3 MG/DL (ref 0.1–1)
CERULOPLASMIN SERPL-MCNC: 28 MG/DL (ref 15–45)
FERRITIN SERPL-MCNC: 173 NG/ML (ref 20–300)
IGG SERPL-MCNC: 1002 MG/DL (ref 650–1600)
IRON SERPL-MCNC: 87 UG/DL (ref 30–160)
PROT SERPL-MCNC: 7.1 G/DL (ref 6–8.4)
SATURATED IRON: 23 % (ref 20–50)
TOTAL IRON BINDING CAPACITY: 371 UG/DL (ref 250–450)
TRANSFERRIN SERPL-MCNC: 251 MG/DL (ref 200–375)

## 2019-06-24 PROCEDURE — 99999 PR PBB SHADOW E&M-EST. PATIENT-LVL IV: CPT | Mod: PBBFAC,,, | Performed by: NURSE PRACTITIONER

## 2019-06-24 PROCEDURE — 82728 ASSAY OF FERRITIN: CPT

## 2019-06-24 PROCEDURE — 82784 ASSAY IGA/IGD/IGG/IGM EACH: CPT

## 2019-06-24 PROCEDURE — 86803 HEPATITIS C AB TEST: CPT

## 2019-06-24 PROCEDURE — 36415 COLL VENOUS BLD VENIPUNCTURE: CPT

## 2019-06-24 PROCEDURE — 91200 LIVER ELASTOGRAPHY: CPT | Mod: 26,S$PBB,, | Performed by: NURSE PRACTITIONER

## 2019-06-24 PROCEDURE — 80076 HEPATIC FUNCTION PANEL: CPT

## 2019-06-24 PROCEDURE — 99204 PR OFFICE/OUTPT VISIT, NEW, LEVL IV, 45-59 MIN: ICD-10-PCS | Mod: S$PBB,,, | Performed by: NURSE PRACTITIONER

## 2019-06-24 PROCEDURE — 83540 ASSAY OF IRON: CPT

## 2019-06-24 PROCEDURE — 99204 OFFICE O/P NEW MOD 45 MIN: CPT | Mod: S$PBB,,, | Performed by: NURSE PRACTITIONER

## 2019-06-24 PROCEDURE — 99214 OFFICE O/P EST MOD 30 MIN: CPT | Mod: PBBFAC | Performed by: NURSE PRACTITIONER

## 2019-06-24 PROCEDURE — 86235 NUCLEAR ANTIGEN ANTIBODY: CPT

## 2019-06-24 PROCEDURE — 86256 FLUORESCENT ANTIBODY TITER: CPT | Mod: 91

## 2019-06-24 PROCEDURE — 91200 PR LIVER ELASTOGRAPHY W/OUT IMAG W/INTERP & REPORT: ICD-10-PCS | Mod: 26,S$PBB,, | Performed by: NURSE PRACTITIONER

## 2019-06-24 PROCEDURE — 99999 PR PBB SHADOW E&M-EST. PATIENT-LVL IV: ICD-10-PCS | Mod: PBBFAC,,, | Performed by: NURSE PRACTITIONER

## 2019-06-24 PROCEDURE — 91200 LIVER ELASTOGRAPHY: CPT | Mod: PBBFAC | Performed by: NURSE PRACTITIONER

## 2019-06-24 PROCEDURE — 82390 ASSAY OF CERULOPLASMIN: CPT

## 2019-06-24 PROCEDURE — 87340 HEPATITIS B SURFACE AG IA: CPT

## 2019-06-24 PROCEDURE — 86038 ANTINUCLEAR ANTIBODIES: CPT

## 2019-06-24 PROCEDURE — 82103 ALPHA-1-ANTITRYPSIN TOTAL: CPT

## 2019-06-24 NOTE — PROGRESS NOTES
I have personally performed a face to face diagnostic evaluation on this patient. I have reviewed and agree with today's findings and the care plan outlined by Karen Reid NP with following comments:     Anat Parks is a pleasant 39 y.o. female who was referred for elevated liver enzymes and hepatic steatosis.     The patient's risk factors for NAFLD include:    · Overweight                                   No (was obese before bariatric surgery)  · Dyslipidemia                                no  · Insulin resistance/Diabetes         no  · Family history of diabetes           no    Agree with obtaining VCTE for hepatic fibrosis/steatosis assessment and checking serologies to rule out other causes of chronic liver diseases for the sake of thoroughness in work up.     We have counseled the patient regarding the life-style modifications: weight loss, low calorie/low fat diet and exercise.

## 2019-06-24 NOTE — LETTER
June 25, 2019      Subhash Palmer PA-C  [address]  [address] [address]           [facility]at[facility]  [address]  [facility] [address]  Phone: [phone]  Fax: [phone]          Patient: Anat Parks   MR Number: [id number]   YOB: 1980   Date of Visit: 6/24/2019       Dear Subhash Palmer:    Thank you for referring Anat Parks to me for evaluation. Attached you will find relevant portions of my assessment and plan of care.    If you have questions, please do not hesitate to call me. I look forward to following Anat Parks along with you.    Sincerely,    Karen Reid, NP    Enclosure  CC:  No Recipients    If you would like to receive this communication electronically, please contact [email or web address] or [phone] to request more information on [facility] access.    For providers and/or their staff who would like to refer a patient to [facility], please contact us through our one-stop-shop provider referral line, [facility], at [phone].    If you feel you have received this communication in error or would no longer like to receive these types of communications, please e-mail [email or web address]

## 2019-06-25 ENCOUNTER — PATIENT MESSAGE (OUTPATIENT)
Dept: HEPATOLOGY | Facility: CLINIC | Age: 39
End: 2019-06-25

## 2019-06-25 PROBLEM — R74.8 ELEVATED LIVER ENZYMES: Status: ACTIVE | Noted: 2019-06-25

## 2019-06-25 LAB
ANA SER QL IF: NORMAL
HBV SURFACE AG SERPL QL IA: NEGATIVE
HCV AB SERPL QL IA: NEGATIVE
MITOCHONDRIA AB TITR SER IF: NORMAL {TITER}
SMOOTH MUSCLE AB TITR SER IF: NORMAL {TITER}

## 2019-06-25 NOTE — PROCEDURES
Fibroscan Procedure     Name: Anat Parks  Date of Procedure : 2019   :: Karen Reid NP  Diagnosis: elevated liver enzymes / NAFLD    Probe: M    Findings  Median liver stiffness score: 5.6 KPa  CAP readin dB/m    IQR/med: 16 %    Interpretation  Fibrosis interpretation is based on medial liver stiffness - Kilopascal (kPa).     Fibrosis stage: F 0-1     Steatosis interpretation is based on controlled attenuation parameter - (dB/m).    Steatosis grade: <S1

## 2019-06-26 LAB
A1AT PHENOTYP SERPL-IMP: NORMAL BANDS
A1AT SERPL NEPH-MCNC: 128 MG/DL (ref 100–190)

## 2019-06-27 ENCOUNTER — TELEPHONE (OUTPATIENT)
Dept: HEPATOLOGY | Facility: CLINIC | Age: 39
End: 2019-06-27

## 2019-06-27 DIAGNOSIS — R74.8 ELEVATED LIVER ENZYMES: Primary | ICD-10-CM

## 2019-06-27 NOTE — TELEPHONE ENCOUNTER
----- Message from Karen Reid NP sent at 6/27/2019  8:01 AM CDT -----  Patient notified of results and recommendations via [facility].   Please schedule hepatic panel in 6 weeks.

## 2019-07-03 ENCOUNTER — PATIENT MESSAGE (OUTPATIENT)
Dept: INTERNAL MEDICINE | Facility: CLINIC | Age: 39
End: 2019-07-03

## 2019-07-04 ENCOUNTER — PATIENT MESSAGE (OUTPATIENT)
Dept: BARIATRICS | Facility: CLINIC | Age: 39
End: 2019-07-04

## 2019-07-08 ENCOUNTER — TELEPHONE (OUTPATIENT)
Dept: INTERNAL MEDICINE | Facility: CLINIC | Age: 39
End: 2019-07-08

## 2019-07-08 DIAGNOSIS — M54.40 CHRONIC MIDLINE LOW BACK PAIN WITH SCIATICA, SCIATICA LATERALITY UNSPECIFIED: Primary | ICD-10-CM

## 2019-07-08 DIAGNOSIS — G89.29 CHRONIC MIDLINE LOW BACK PAIN WITH SCIATICA, SCIATICA LATERALITY UNSPECIFIED: Primary | ICD-10-CM

## 2019-07-08 NOTE — TELEPHONE ENCOUNTER
Pain management referral and demographics faxed to Dr. Quintero/Marcelo per patient request at [phone]. Fax confirmation received.

## 2019-07-10 ENCOUNTER — TELEPHONE (OUTPATIENT)
Dept: INTERNAL MEDICINE | Facility: CLINIC | Age: 39
End: 2019-07-10

## 2019-07-10 NOTE — TELEPHONE ENCOUNTER
Patient's referral to Dr Michael Quintero has been authorized by Ken. Authorization #[id number]; 7 visits; visit date range 7/4/2019 to 7/3/2020

## 2019-07-15 ENCOUNTER — TELEPHONE (OUTPATIENT)
Dept: INTERNAL MEDICINE | Facility: CLINIC | Age: 39
End: 2019-07-15

## 2019-07-15 NOTE — TELEPHONE ENCOUNTER
----- Message from Cecille Hanna sent at 7/15/2019 12:14 PM CDT -----  Contact: self  Anat aPrks  MRN: [id number]  : 1980  PCP: Azalia Muir  Home Phone      [phone]  Work Phone      Not on file.  Mobile          [phone]      MESSAGE:  Pt states that she is still receiving bills for he bariatric appts. Pt states there was supposed to be a referral sent where her  insurance can cover it, and  states they never received the referral. Pt would like to know if it can be sent again. Please advise, thanks.   Phone: [phone]

## 2019-07-16 DIAGNOSIS — K21.9 GASTROESOPHAGEAL REFLUX DISEASE, ESOPHAGITIS PRESENCE NOT SPECIFIED: ICD-10-CM

## 2019-07-16 RX ORDER — OMEPRAZOLE 40 MG/1
40 CAPSULE, DELAYED RELEASE ORAL EVERY MORNING
Qty: 90 CAPSULE | Refills: 1 | Status: SHIPPED | OUTPATIENT
Start: 2019-07-16 | End: 2019-10-10 | Stop reason: SDUPTHER

## 2019-07-22 ENCOUNTER — HOSPITAL ENCOUNTER (OUTPATIENT)
Dept: RADIOLOGY | Facility: HOSPITAL | Age: 39
Discharge: HOME | End: 2019-07-22
Attending: NURSE PRACTITIONER
Payer: OTHER GOVERNMENT

## 2019-07-22 DIAGNOSIS — R74.8 ELEVATED LIVER ENZYMES: ICD-10-CM

## 2019-07-22 PROCEDURE — 76700 US ABDOMEN COMPLETE: ICD-10-PCS | Mod: 26,,, | Performed by: RADIOLOGY

## 2019-07-22 PROCEDURE — 76700 US EXAM ABDOM COMPLETE: CPT | Mod: 26,,, | Performed by: RADIOLOGY

## 2019-07-22 PROCEDURE — 76700 US EXAM ABDOM COMPLETE: CPT | Mod: TC

## 2019-07-29 ENCOUNTER — PATIENT MESSAGE (OUTPATIENT)
Dept: BARIATRICS | Facility: CLINIC | Age: 39
End: 2019-07-29

## 2019-08-06 NOTE — TELEPHONE ENCOUNTER
Administered Neupogen 480mcg into the right arm (subcutaneous), see CHANEL Duncan MA       Spoke with pt. She was informed. All is good. She will call them to schedule.

## 2019-08-08 ENCOUNTER — LAB VISIT (OUTPATIENT)
Dept: LAB | Facility: HOSPITAL | Age: 39
End: 2019-08-08
Attending: NURSE PRACTITIONER
Payer: OTHER GOVERNMENT

## 2019-08-08 ENCOUNTER — TELEPHONE (OUTPATIENT)
Dept: HEPATOLOGY | Facility: CLINIC | Age: 39
End: 2019-08-08

## 2019-08-08 DIAGNOSIS — R74.8 ELEVATED LIVER ENZYMES: Primary | ICD-10-CM

## 2019-08-08 DIAGNOSIS — R74.8 ELEVATED LIVER ENZYMES: ICD-10-CM

## 2019-08-08 LAB
ALBUMIN SERPL BCP-MCNC: 4.2 G/DL (ref 3.5–5.2)
ALP SERPL-CCNC: 47 U/L (ref 55–135)
ALT SERPL W/O P-5'-P-CCNC: 51 U/L (ref 10–44)
AST SERPL-CCNC: 28 U/L (ref 10–40)
BILIRUB DIRECT SERPL-MCNC: 0.2 MG/DL (ref 0.1–0.3)
BILIRUB SERPL-MCNC: 0.5 MG/DL (ref 0.1–1)
PROT SERPL-MCNC: 6.6 G/DL (ref 6–8.4)

## 2019-08-08 PROCEDURE — 80076 HEPATIC FUNCTION PANEL: CPT

## 2019-08-08 PROCEDURE — 36415 COLL VENOUS BLD VENIPUNCTURE: CPT

## 2019-08-08 NOTE — TELEPHONE ENCOUNTER
----- Message from Karen Reid NP sent at 8/8/2019  9:56 AM CDT -----  Patient notified of results and recommendations via [facility].   Please schedule hepatic panel in 6 months.

## 2019-09-17 ENCOUNTER — OFFICE VISIT (OUTPATIENT)
Dept: INTERNAL MEDICINE | Facility: CLINIC | Age: 39
End: 2019-09-17
Payer: OTHER GOVERNMENT

## 2019-09-17 VITALS
WEIGHT: 163.38 LBS | HEIGHT: 68 IN | SYSTOLIC BLOOD PRESSURE: 116 MMHG | DIASTOLIC BLOOD PRESSURE: 74 MMHG | OXYGEN SATURATION: 98 % | BODY MASS INDEX: 24.76 KG/M2 | RESPIRATION RATE: 20 BRPM | HEART RATE: 100 BPM

## 2019-09-17 DIAGNOSIS — R11.0 NAUSEA: ICD-10-CM

## 2019-09-17 DIAGNOSIS — Z98.890 S/P GASTRIC SURGERY: ICD-10-CM

## 2019-09-17 DIAGNOSIS — K52.9 GASTROENTERITIS: Primary | ICD-10-CM

## 2019-09-17 PROCEDURE — 99999 PR PBB SHADOW E&M-EST. PATIENT-LVL IV: CPT | Mod: PBBFAC,,, | Performed by: NURSE PRACTITIONER

## 2019-09-17 PROCEDURE — 99214 OFFICE O/P EST MOD 30 MIN: CPT | Mod: PBBFAC,25 | Performed by: NURSE PRACTITIONER

## 2019-09-17 PROCEDURE — 99213 OFFICE O/P EST LOW 20 MIN: CPT | Mod: S$PBB,,, | Performed by: NURSE PRACTITIONER

## 2019-09-17 PROCEDURE — 96372 THER/PROPH/DIAG INJ SC/IM: CPT | Mod: PBBFAC

## 2019-09-17 PROCEDURE — 99999 PR PBB SHADOW E&M-EST. PATIENT-LVL IV: ICD-10-PCS | Mod: PBBFAC,,, | Performed by: NURSE PRACTITIONER

## 2019-09-17 PROCEDURE — 99213 PR OFFICE/OUTPT VISIT, EST, LEVL III, 20-29 MIN: ICD-10-PCS | Mod: S$PBB,,, | Performed by: NURSE PRACTITIONER

## 2019-09-17 RX ORDER — ONDANSETRON 8 MG/1
8 TABLET, ORALLY DISINTEGRATING ORAL EVERY 6 HOURS PRN
Qty: 15 TABLET | Refills: 0 | Status: SHIPPED | OUTPATIENT
Start: 2019-09-17

## 2019-09-17 RX ORDER — CIPROFLOXACIN 500 MG/1
500 TABLET ORAL EVERY 12 HOURS
Qty: 10 TABLET | Refills: 0 | Status: SHIPPED | OUTPATIENT
Start: 2019-09-17 | End: 2019-09-17

## 2019-09-17 RX ORDER — ONDANSETRON 2 MG/ML
4 INJECTION INTRAMUSCULAR; INTRAVENOUS ONCE
Status: COMPLETED | OUTPATIENT
Start: 2019-09-17 | End: 2019-09-17

## 2019-09-17 RX ADMIN — ONDANSETRON HYDROCHLORIDE 4 MG: 2 INJECTION, SOLUTION INTRAVENOUS at 02:09

## 2019-09-17 NOTE — PATIENT INSTRUCTIONS
Food Poisoning (Adult)  Food poisoning is illness that is passed along in food. It usually occurs from 1 to 24 hours after eating food that has spoiled. Food poisoning can occur when you eat food or drink that contains viruses, bacteria, parasites, or toxins. This includes food that has not been cooked or refrigerated properly.  Food poisoning can cause these symptoms:  · Abdominal pain and cramping  · Nausea  · Vomiting  · Diarrhea  · Fever and chills  · Fatigue  The symptoms usually last from 1 to 2 days.  Antibiotics are not effective for this illness.  Home care  Follow all instructions given by your healthcare provider. Rest at home for the next 24 hours, or until you feel better. Avoid caffeine, tobacco, and alcohol. These can make diarrhea, cramping, and pain worse.  If taking medicines:  · Dont take over-the-counter diarrhea or nausea medicines unless your healthcare provider tells you to.  · You may be given medicine for nausea or vomiting to help keep down fluids. Take these medicines as prescribed.  · You may use acetaminophen or NSAID medicine like ibuprofen or naproxen to reduce pain and fever. Dont use these if you have chronic liver or kidney disease, or ever had a stomach ulcer or gastrointestinal bleeding. Talk with your healthcare provider first. Don't use NSAID medicines if you are already taking one for another condition (like arthritis) or are on aspirin (such as for heart disease or after a stroke).  To prevent the spread of illness:  · Remember that washing with soap and water or using alcohol-based  is the best way to prevent the spread of infection.  · Clean the toilet after each use.  · Wash your hands before eating.  · Wash your hands or use alcohol-based  before and after preparing food. Keep in mind that people with diarrhea or vomiting should not prepare food for others.  · Wash your hands after using cutting boards, counter-tops, and knives or other utensils that  have been in contact with raw foods.  · Wash and then peel fruits and vegetables.  · Keep uncooked meats away from cooked and ready-to-eat foods.  · Use a food thermometer when cooking. Cook poultry to at least 165°F (74°C). Cook ground meat (beef, veal, pork, lamb) to at least 160°F (71°C). Cook fresh beef, veal, lamb, and pork to at least 145°F (63°C).  · Dont eat raw or undercooked eggs (poached or rome side up), poultry, meat or unpasteurized milk and juices.  · Do not eat foods requiring refrigeration. Don't eat foods that have not been refrigerated for long periods such as at buffets or picnics.  · Do not eat seafood that is undercooked or with high rates of food toxins.  Food and drinks  The main goal while treating vomiting or diarrhea is to prevent dehydration. This is done by taking small amounts of liquids often.  · Keep in mind that liquids are more important than food right now.  · Drink only small amounts of liquids at a time.  · Dont force yourself to eat, especially if you are having cramping, vomiting, or diarrhea. Dont eat large amounts at a time, even if you are hungry.  · If you eat, avoid fatty, greasy, spicy, or fried foods.  · Dont eat dairy foods or drink milk if you have diarrhea. These can make diarrhea worse.  The first 24 hours you can try:  · Soft drinks without caffeine  · Ginger ale  · Water (plain or flavored)  · Decaf tea or coffee  · Clear broth, consommé, or bouillon  · Gelatin, popsicles, or frozen fruit juice bars  If you are very dehydrated, sports drinks are not a good choice. They have too much sugar and not enough electrolytes. In this case, commercially available products called oral rehydration solutions are best.  The second 24 hours, if you are feeling better, you can add:  · Hot cereal, plain toast, bread, rolls, or crackers  · Plain noodles, rice, mashed potatoes, chicken noodle soup, or rice soup  · Unsweetened canned fruit (no pineapple)  · Bananas  As you  recover:  · Limit fat intake to less than 15 grams per day. Dont eat margarine, butter, oils, mayonnaise, sauces, gravies, fried foods, peanut butter, meat, poultry, or fish.  · Limit fiber. Dont eat raw or cooked vegetables, fresh fruits except bananas, and bran cereals.  · Limit caffeine and chocolate.  · Dont use spices or seasonings except salt.  · Resume a normal diet over time, as you feel better and your symptoms improve.  · If the symptoms come back, go back to a simple diet or clear liquids.  Follow-up care  Follow up with your healthcare provider, or as advised. If a stool sample was taken or cultures were done, call the healthcare provider for the results as instructed.  Call 911  Call 911 if you have any of these symptoms:  · Trouble breathing  · Chest pain  · Confusion  · Extreme drowsiness or trouble walking  · Loss of consciousness  · Rapid heart rate  · Stiff neck  · Seizure  When to seek medical advice  Call your health care provider right away if any of these occur:  · Abdominal pain that gets worse  · Constant lower right abdominal pain  · Continued vomiting and inability to keep liquids down  · Diarrhea more than 5 times a day  · Blood in vomit or stool  · Dark urine or no urine for 8 hours, dry mouth and tongue, tiredness, weakness, or dizziness  · New rash  · You dont get better in 2 to 3 days  · Fever of 100.4°F (38°C) or higher that doesnt get lower with medicine  Date Last Reviewed: 1/3/2016  © 4202-2779 [employer]. [address] [place] [address], [place] [address]. All rights reserved. This information is not intended as a substitute for professional medical care. Always follow your healthcare professional's instructions.      Lots of oral fluids  If feeling worse with weakness of dizziness then go to ER for further eval and fluids.

## 2019-09-17 NOTE — PROGRESS NOTES
[facility] Note    Chief Complaint      Chief Complaint   Patient presents with    Dizziness    Fatigue    Nausea     History of Present Illness      Anat Parks is a 39 y.o. female who presents today new to me; known to fellow providers;  PMHX of gastric bypass, Depression, anxiety, IBS, abn LFts .  Patient comes to appointment with c/o severe nausea since  after eating chicken from [employer]; no diarrhea but notes had gastric bypass   + son with same after eating but recoved in 24 hrs   No fever     Problem List Items Addressed This Visit     None      Visit Diagnoses     Gastroenteritis    -  Primary    S/P gastric surgery        Nausea        Relevant Medications    ondansetron injection 4 mg (Completed)    ondansetron (ZOFRAN-ODT) 8 MG TbDL    Food poisoning, accidental or unintentional, initial encounter              Health Maintenance   Topic Date Due    Pneumococcal Vaccine (Medium Risk) (1  - PPSV23) 1998    Mammogram  2021    TETANUS VACCINE  10/19/2026       Past Medical History:   Diagnosis Date    Abdominal pain, other specified site     Right lower abdomen    Acid reflux     Anxiety     Bilateral ovarian cysts     Mostly on right, but left also    Bulging lumbar disc     DDD (degenerative disc disease), lumbar     Degenerative disc disease 2017    Lower back    Depression     Fatigue     Fatty liver     Fibroid tumor     Right ovary-sm    Heartburn     IBS (irritable bowel syndrome)     Menorrhagia     Ulcer     Had an upper gi take Prilosec       Past Surgical History:   Procedure Laterality Date     SECTION  3/1997; 2000    CHOLECYSTECTOMY  2009    COLONOSCOPY  2007    CYSTOSCOPY N/A 2016    [facility]    ESOPHAGOGASTRODUODENOSCOPY      ESOPHAGOGASTRODUODENOSCOPY      ESOPHAGOGASTRODUODENOSCOPY (EGD) N/A 2014    Performed by Jaspal RAYO  MD Ingrid at [facility] ENDO    ESOPHAGOGASTRODUODENOSCOPY (EGD) - 77912 N/A 8/15/2018    Performed by Dalton Chirinos MD at [facility] ENDO (4TH FLR)    gall bladder  2011    GASTROENTEROSTOMY, LAPAROSCOPIC N/A 2018    Performed by Kale Renee Jr., MD at [facility] OR 2ND FLR    HYSTERECTOMY  2016    TL BLEEDING     HYSTERECTOMY-TOTAL LAPAROSCOPIC (TLH) N/A 2016    Performed by [facility] at [facility] OR    LYSIS-ADHESIONS N/A 2016    Performed by Kay [facility]ona MD at [facility] OR    SALPINGECTOMY-LAPAROSCOPIC Bilateral 2016    Performed by [facility] at [facility] OR    TUBAL LIGATION  2000    UPPER GASTROINTESTINAL ENDOSCOPY         family history includes Alcohol abuse in her father; Bipolar disorder in her mother; Breast cancer in her paternal aunt; Cancer in her maternal aunt, maternal grandmother, mother, and paternal aunt; Colon cancer in her maternal grandmother; Drug abuse in her brother and mother; Kidney disease in her father; Obesity in her maternal grandmother; Ovarian cancer in her mother; Stroke in her maternal grandmother.    Social History     Tobacco Use    Smoking status: Former Smoker     Packs/day: 0.50     Years: 6.00     Pack years: 3.00     Types: Cigarettes     Start date: 11/10/1996     Last attempt to quit: 2012     Years since quittin.2    Smokeless tobacco: Never Used    Tobacco comment: quit 6 years ago   Substance Use Topics    Alcohol use: Yes     Frequency: 2-4 times a month    Drug use: No       HPI    Review of Systems   Constitutional: Negative for chills, fatigue and fever.   HENT: Negative for congestion, ear pain, postnasal drip, sinus pressure, sneezing and sore throat.    Eyes: Negative for discharge.   Respiratory: Negative for cough, chest tightness and shortness of breath.    Cardiovascular: Negative.    Gastrointestinal: Positive for nausea. Negative for abdominal pain, constipation, diarrhea and vomiting.    Genitourinary: Negative for difficulty urinating, flank pain and hematuria.   Musculoskeletal: Negative.  Negative for arthralgias and joint swelling.   Skin: Negative.    Neurological: Negative for dizziness and headaches.   Psychiatric/Behavioral: Negative for behavioral problems and confusion.        Outpatient Encounter Medications as of 9/17/2019   Medication Sig Note Dispense Refill    ALPRAZolam (XANAX) 0.5 MG tablet Take 1 tablet (0.5 mg total) by mouth 2 (two) times daily as needed for anxiety.  60 tablet 5    b complex vitamins capsule Take 1 capsule by mouth once daily.       BIOTIN ORAL Take 200 mg by mouth once daily.        buPROPion (WELLBUTRIN XL) 300 MG 24 hr tablet Take 1 tablet (300 mg total) by mouth once daily.  90 tablet 1    CALCIUM CITRATE ORAL Take 1 tablet by mouth 3 (three) times daily.       dicyclomine (BENTYL) 20 mg tablet TAKE 1 TABLET FOUR TIMES A DAY BEFORE MEALS AND NIGHTLY 11/7/2018: Hold am of surgery  360 tablet 0    eszopiclone (LUNESTA) 3 mg Tab Take 1 tablet (3 mg total) by mouth nightly as needed.  90 tablet 1    fluconazole (DIFLUCAN) 200 MG Tab Take one tablet (200 mg) as needed for vulvar irritation.May take one tablet every 3 days as needed.Do not exceed 3 tablets consecutively.  30 tablet 0    HYDROcodone bitartrate 10 mg CR12 Take 1 capsule by mouth every 12 hours  60 each 0    HYDROcodone-acetaminophen (NORCO) 5-325 mg per tablet Take one tablet by mouth once a day as needed  30 tablet 0    lubiprostone (AMITIZA) 8 MCG Cap Take 1 capsule (8 mcg total) by mouth 2 (two) times daily with meals.  180 capsule 1    MAGNESIUM ORAL Take 400 mg by mouth 2 (two) times daily.       multivitamin/iron/folic acid (CENTRUM COMPLETE ORAL) Take by mouth.       omeprazole (PRILOSEC) 40 MG capsule Take 1 capsule (40 mg total) by mouth every morning. Open capsule and take with apple sauce (Patient taking differently: Take 40 mg by mouth 2 (two) times daily before meals.  "Open capsule and take with apple sauce)  90 capsule 1    topiramate (TOPAMAX) 50 MG tablet take one tablet by mouth twice a day  180 tablet 0    amitriptyline (ELAVIL) 75 MG tablet Take 1 tablet (75 mg total) by mouth every evening.  90 tablet 1    ondansetron (ZOFRAN-ODT) 8 MG TbDL Dissolve 1 tablet  (8 mg total) under the tounge every 6 (six) hours as needed.  15 tablet 0    [DISCONTINUED] ciprofloxacin HCl (CIPRO) 500 MG tablet Take 1 tablet (500 mg total) by mouth every 12 (twelve) hours.  10 tablet 0    [DISCONTINUED] HYDROcodone bitartrate (HYSINGLA ER) 30 mg TP24 Take one tablet by mouth once  a day  30 each 0    [DISCONTINUED] pediatric multivit-iron-min (FLINTSTONES COMPLETE, IRON,) Chew Take 1 tablet by mouth 2 (two) times daily.        Facility-Administered Encounter Medications as of 9/17/2019   Medication Dose Route Frequency Provider Last Rate Last Dose    [COMPLETED] ondansetron injection 4 mg  4 mg Intramuscular Once Malika Vides, NP   4 mg at 09/17/19 1440        Review of patient's allergies indicates:  No Known Allergies    Physical Exam      Vital Signs  Pulse: 100  Resp: 20  SpO2: 98 %  BP: 116/74  Pain Score: 0-No pain  Height and Weight  Height: 5' 8" (172.7 cm)  Weight: 74.1 kg (163 lb 5.8 oz)  BSA (Calculated - sq m): 1.89 sq meters  BMI (Calculated): 24.9  Weight in (lb) to have BMI = 25: 164.1]    @Physical Exam   Constitutional: She is oriented to person, place, and time. She appears well-developed and well-nourished.   HENT:   Head: Normocephalic and atraumatic.   Right Ear: External ear normal.   Left Ear: External ear normal.   Nose: Nose normal.   Mouth/Throat: Oropharynx is clear and moist.   Eyes: Pupils are equal, round, and reactive to light. Conjunctivae and EOM are normal.   Neck: Normal range of motion. Neck supple. No thyromegaly present.   Cardiovascular: Normal rate, regular rhythm, normal heart sounds and intact distal pulses.   No murmur " heard.  Pulmonary/Chest: Effort normal and breath sounds normal. No stridor. No respiratory distress. She has no wheezes. She has no rales.   Abdominal: Soft. Bowel sounds are normal. She exhibits no distension and no mass. There is no tenderness. There is no guarding.   generalized tenderness- not localized; + Bowel sounds   Musculoskeletal: Normal range of motion. She exhibits no edema.   Lymphadenopathy:     She has no cervical adenopathy.   Neurological: She is alert and oriented to person, place, and time. No cranial nerve deficit.   Skin: Skin is warm and dry. Capillary refill takes less than 2 seconds.   Psychiatric: She has a normal mood and affect. Her behavior is normal. Judgment and thought content normal.   Nursing note and vitals reviewed.        Laboratory:  CBC:  No results for input(s): WBC, RBC, HGB, HCT, PLT, MCV, MCH, MCHC in the last 2160 hours.  CMP:  Recent Labs   Lab Result Units 06/24/19  1449 08/08/19  0839   Albumin g/dL 4.0 4.2   Total Protein g/dL 7.1 6.6   Alkaline Phosphatase U/L 40* 47*   ALT U/L 85* 51*   AST U/L 41* 28   Total Bilirubin mg/dL 0.3 0.5     URINALYSIS:  No results for input(s): COLORU, CLARITYU, SPECGRAV, PHUR, PROTEINUA, GLUCOSEU, BILIRUBINCON, BLOODU, WBCU, RBCU, BACTERIA, MUCUS, NITRITE, LEUKOCYTESUR, UROBILINOGEN, HYALINECASTS in the last 2160 hours.   LIPIDS:  No results for input(s): TSH, HDL, CHOL, TRIG, LDLCALC, CHOLHDL, NONHDLCHOL, TOTALCHOLEST in the last 2160 hours.  TSH:  No results for input(s): TSH in the last 2160 hours.  A1C:  No results for input(s): HGBA1C in the last 2160 hours.    Radiology:      Assessment/Plan     Anat Parks is a 39 y.o.female with:    1. Gastroenteritis    2. S/P gastric surgery    3. Nausea  - ondansetron injection 4 mg  - ondansetron (ZOFRAN-ODT) 8 MG TbDL; Dissolve 1 tablet  (8 mg total) under the tounge every 6 (six) hours as needed.  Dispense: 15 tablet; Refill: 0    4. Food poisoning, accidental or  unintentional, initial encounter      -Continue current medications and maintain follow up with specialists.  Return to clinic  prn if not better.        Malika Vides MD  [facility]

## 2019-10-08 RX ORDER — BUPROPION HYDROCHLORIDE 300 MG/1
TABLET ORAL
Qty: 90 TABLET | Refills: 4 | Status: SHIPPED | OUTPATIENT
Start: 2019-10-08 | End: 2020-03-31 | Stop reason: SDUPTHER

## 2019-10-10 ENCOUNTER — PATIENT MESSAGE (OUTPATIENT)
Dept: BARIATRICS | Facility: CLINIC | Age: 39
End: 2019-10-10

## 2019-10-10 DIAGNOSIS — K21.9 GASTROESOPHAGEAL REFLUX DISEASE, ESOPHAGITIS PRESENCE NOT SPECIFIED: ICD-10-CM

## 2019-10-10 RX ORDER — OMEPRAZOLE 40 MG/1
40 CAPSULE, DELAYED RELEASE ORAL
Qty: 180 CAPSULE | Refills: 0 | Status: SHIPPED | OUTPATIENT
Start: 2019-10-10 | End: 2020-03-02

## 2019-10-31 ENCOUNTER — TELEPHONE (OUTPATIENT)
Dept: BARIATRICS | Facility: CLINIC | Age: 39
End: 2019-10-31

## 2019-10-31 ENCOUNTER — PATIENT MESSAGE (OUTPATIENT)
Dept: BARIATRICS | Facility: CLINIC | Age: 39
End: 2019-10-31

## 2019-10-31 NOTE — TELEPHONE ENCOUNTER
----- Message from Angelita Rust sent at 10/31/2019  3:29 PM CDT -----  Contact: self @ [phone]  Pt says she needs to get a letter for her employer so that she can have her protein shake at work.  Pls call.

## 2019-10-31 NOTE — TELEPHONE ENCOUNTER
Returned call and discussed the letter that the patient needs for her employer which is at [facility] stating that she must drink a liquid protein shake b/w 5-617.   She wants the letter emailed to [email or web address].  Will compose letter and email as requested.

## 2019-11-01 ENCOUNTER — TELEPHONE (OUTPATIENT)
Dept: BARIATRICS | Facility: CLINIC | Age: 39
End: 2019-11-01

## 2019-11-01 NOTE — TELEPHONE ENCOUNTER
----- Message from Angelita Rust sent at 11/1/2019  3:05 PM CDT -----  Contact: self @ [phone]  Pt is requesting to speak with Ori concerning the letter she requested.  She says it will need to be faxed prior to 4:00 because the facility will be closing at 4:00.  If you are not able to fax today before 4:00 pls do not fax at all.  Would like to see if it can be e-mail it to her at [email or web address].  If not she will try to find a new fax number.

## 2019-11-04 ENCOUNTER — PATIENT MESSAGE (OUTPATIENT)
Dept: BARIATRICS | Facility: CLINIC | Age: 39
End: 2019-11-04

## 2019-11-05 ENCOUNTER — PATIENT MESSAGE (OUTPATIENT)
Dept: INTERNAL MEDICINE | Facility: CLINIC | Age: 39
End: 2019-11-05

## 2019-11-06 ENCOUNTER — EXTERNAL RECORD (OUTPATIENT)
Dept: OTHER | Age: 39
End: 2019-11-06

## 2019-11-11 ENCOUNTER — OFFICE VISIT (OUTPATIENT)
Dept: PSYCHIATRY | Facility: CLINIC | Age: 39
End: 2019-11-11
Payer: OTHER GOVERNMENT

## 2019-11-11 VITALS
RESPIRATION RATE: 17 BRPM | HEART RATE: 68 BPM | DIASTOLIC BLOOD PRESSURE: 60 MMHG | WEIGHT: 163.5 LBS | BODY MASS INDEX: 24.78 KG/M2 | SYSTOLIC BLOOD PRESSURE: 100 MMHG | HEIGHT: 68 IN

## 2019-11-11 DIAGNOSIS — F51.04 PSYCHOPHYSIOLOGICAL INSOMNIA: ICD-10-CM

## 2019-11-11 DIAGNOSIS — F33.42 MAJOR DEPRESSIVE DISORDER, RECURRENT, IN FULL REMISSION: ICD-10-CM

## 2019-11-11 DIAGNOSIS — F41.0 PANIC DISORDER: ICD-10-CM

## 2019-11-11 DIAGNOSIS — F41.1 GAD (GENERALIZED ANXIETY DISORDER): Primary | ICD-10-CM

## 2019-11-11 PROCEDURE — 99214 PR OFFICE/OUTPT VISIT, EST, LEVL IV, 30-39 MIN: ICD-10-PCS | Mod: S$PBB,,, | Performed by: PSYCHIATRY & NEUROLOGY

## 2019-11-11 PROCEDURE — 99214 OFFICE O/P EST MOD 30 MIN: CPT | Mod: S$PBB,,, | Performed by: PSYCHIATRY & NEUROLOGY

## 2019-11-11 PROCEDURE — 99999 PR PBB SHADOW E&M-EST. PATIENT-LVL IV: CPT | Mod: PBBFAC,,, | Performed by: PSYCHIATRY & NEUROLOGY

## 2019-11-11 PROCEDURE — 99214 OFFICE O/P EST MOD 30 MIN: CPT | Mod: PBBFAC | Performed by: PSYCHIATRY & NEUROLOGY

## 2019-11-11 PROCEDURE — 99999 PR PBB SHADOW E&M-EST. PATIENT-LVL IV: ICD-10-PCS | Mod: PBBFAC,,, | Performed by: PSYCHIATRY & NEUROLOGY

## 2019-11-11 RX ORDER — ALPRAZOLAM 1 MG/1
1 TABLET ORAL DAILY PRN
Qty: 30 TABLET | Refills: 1 | Status: SHIPPED | OUTPATIENT
Start: 2019-11-11 | End: 2020-01-09 | Stop reason: SDUPTHER

## 2019-11-11 RX ORDER — AMITRIPTYLINE HYDROCHLORIDE 100 MG/1
100 TABLET ORAL NIGHTLY
Qty: 30 TABLET | Refills: 1 | Status: SHIPPED | OUTPATIENT
Start: 2019-11-11 | End: 2019-12-17 | Stop reason: SDUPTHER

## 2019-11-11 NOTE — PROGRESS NOTES
Outpatient Psychiatry Follow-Up Visit (MD/NP)    2019    Clinical Status of Patient:  Outpatient (Ambulatory)    Chief Complaint:  Anat Parks is a 39 y.o. female who presents today for follow-up of depression and anxiety.  Met with patient and spouse.      Interval History and Content of Current Session:  Interim Events/Subjective Report/Content of Current Session:   The patient was seen and her chart reviewed. Reviewed notes from Subhash Palmer PA-C at 2019  2:42 PM;  Karen Reid NP at 2019 11:00 AM;  Morelia Michaels MD at 2019 11:00 AM; Karen Reid NP at 2019 11:02 AM; and Malika Vides NP at 2019  4:15 PM     She has been compliant with treatment. She had no adverse reactions or side effects.  She tolerated the previous medication changes well without complications.     Some new psychosocial stressors were presented today. Her marriage is going well. She continues to deal with family struggles (balancing work/home/life responsibilities), but this has been well managed. Her  previously had a successful tx with the spinal stimulator for his chronic pain (some recent adjustments were made; some noted benefit)- he is doing better with his pain overall, but he has continued PTSD-related issues at times; he is also trying to lose weight (seeing a weight  with good effect; doing well with weight loss goals). 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 Her youngest son is attending [employer] and is doing well; also has an internship. Her oldest left the [employer]- he is now employed and continues doing well.  A cousin recently .     She had no new medical issues since she was last seen. She and her family continue working on their diet and have been losing weight- she was approved for and completed the gastric by-pass surgery (it  "went well); nutritional markers remain stable.  She previously had had a sleep study- was diagnosed with mild ERICH and recommended to loose weight to correct it (f/u study to be done in the future).     "I've been ok."  Overall, symptoms remain remains adequately controlled with periodic bouts of  irritability and panic attacks; they are much better controlled overall with rare breakthroughs; she had some psychiatric worsening with a pain med which has been resolving since stopping it. SHe feels that the xanax and Elavil are less effective than they were (liklye from changes in absorption from her bariatric surgery)..    Improved/Controlled Symptoms of Depression: no diminished mood, no loss of interest/anhedonia; no irritability, no diminished energy, no change in sleep (see below; improved overall), no change in appetite, no diminished concentration or cognition or indecisiveness, no PMA/R, no excessive guilt, denied hopelessness/worthlessness, no suicidal ideations    Controlled Sleep disturbance: no trouble with initiation (relieved with medications- may happen occasionally; may be getting some tolerance to the lunesta), no issues maintenance, no early morning awakening with inability to return to sleep; getting about 8-12 hours per night on average. She had not required  lunesta since the last appointment     No Suicidal/Homicidal ideations: no active/passive ideations, organized plans, or future intentions; no past attempts.     Improving Symptoms of CHRISTINE: no excessive anxiety/worry/fear, no restlessness, no fatigue, no poor concentration, no irritability, no muscle tension, no sleep disturbance     Improved symptoms of Panic Disorder: less panics attacks per 1 week- remain triggered but somewhat difficult to manage. They are managed with xanax.    No Symptoms of sexual disorders: no decrease in libido/desire (better); no issues and orgasmic (better); no pain    Overall, her IBS persists but is controlled; Back " pain is better but persists.     Weight: she is working on dieting to lose weight. Her weight on 10/31/17 was 254 lbs; weight on 2/7/18 was 240 lbs; weight on 4/30/18 was 241 lbs and is 223 lbs on 10/8/18. It was 167 lbs at the last appointment. She is at 163 lbs today      Previous medication trials include luvox (increased irritability).            Review of Systems   · PSYCHIATRIC: Pertinant items are noted in the narrative.  · CONSTITUTIONAL: No weight gain or loss.   · MUSCULOSKELETAL: No pain or stiffness of the joints.  · NEUROLOGIC: No weakness, sensory changes, seizures, confusion, memory loss, tremor or other abnormal movements.  · ENDOCRINE: No polydipsia or polyuria.  · INTEGUMENTARY: No rashes or lacerations.  · EYES: No exophthalmos, jaundice or blindness.  · ENT: No dizziness, tinnitus or hearing loss.  · RESPIRATORY: No shortness of breath.  · CARDIOVASCULAR: No tachycardia or chest pain.  · GASTROINTESTINAL: No nausea, vomiting, pain, constipation or diarrhea.  · GENITOURINARY: No frequency, dysuria or sexual dysfunction.  · HEMATOLOGIC/LYMPHATIC: No excessive bleeding, prolonged or excessive bleeding after dental extraction/injury.  · ALLERGIC/IMMUNOLOGIC: No allergic response to materials, foods or animals at this time.    Past Medical, Family and Social History: The patient's past medical, family and social history have been reviewed and updated as appropriate within the electronic medical record - see encounter notes.    Compliance: yes    Side effects: None    Risk Parameters:  Patient reports no suicidal ideation  Patient reports no homicidal ideation  Patient reports no self-injurious behavior  Patient reports no violent behavior           Exam (detailed: at least 9 elements; comprehensive: all 15 elements)   Constitutional  Vitals:  Most recent vital signs, dated less than 90 days prior to this appointment, were reviewed.   Vitals:    11/11/19 1150 11/11/19 1214   BP: (!) 100/58 100/60  "  Pulse: 68 68   Resp: 17 17   Weight: 74.2 kg (163 lb 7.5 oz) 74.2 kg (163 lb 7.5 oz)   Height: 5' 8" (1.727 m) 5' 8" (1.727 m)     Body mass index is 24.86 kg/m².         General:  unremarkable, age appropriate, casually dressed, neatly groomed, obese     Musculoskeletal  Muscle Strength/Tone:  no spasicity, no rigidity, no dyskinesia, no dystonia, no tremor   Gait & Station:  non-ataxic     Psychiatric  Speech:  no latency; no press, spontaneous   Mood & Affect:  steady, euthymic "ok"   congruent and appropriate, mood-congruent, full   Thought Process:  normal and logical, goal-directed   Associations:  intact   Thought Content:  normal, no suicidality, no homicidality, delusions, or paranoia   Insight:  intact, has awareness of illness   Judgement: behavior is adequate to circumstances, age appropriate   Orientation:  person, place, situation, time/date, day of week, month of year, year   Memory: intact for content of interview, able to remember recent events- yes, able to remember remote events- yes   Language: grossly intact, able to name, able to repeat   Attention Span & Concentration:  able to focus, completed tasks   Fund of Knowledge:  intact and appropriate to age and level of education, familiar with aspects of current personal life, 4 of 4 recent presidents     Assessment and Diagnosis   Status/Progress: Based on the examination today, the patient's problem(s) is/are adequately but not ideally controlled.  New problems have not been presented today.   Co-morbidities are not complicating management of the primary condition.  There are no active rule-out diagnoses for this patient at this time.     General Impression:  MDD, recurrent, moderate, in full remission  CHRISTINE  Panic Disorder without agoraphobia   Insomnia    Psychosocial stressors    IBS  GERD  Ovarian cysts  Overweight, s/p gastric bypass  S/p Gastric bypass       Intervention/Counseling/Treatment Plan   Treatment Plan/Recommendations: "     Medications:  -Continue Wellbutrin  mg po qDay for depression and off-label CHRISTINE  -Continue Elavil 75 mg po q HS for adjunctive depression, anxiety and IBS  -Change Xanax to 1 mg po q Day prn anxiety  -Continue Probiotic for adjunctive depression     Discussed diagnosis, risks and benefits of proposed treatment vs alternative treatments vs no treatment, and potential side effects of these treatments.  The patient expresses understanding of the above and displays the capacity to agree with this treatment given said understanding.  Patient also agrees that, currently, the benefits outweigh the risks and would like to pursue treatment at this time.    Therapy:  Pt is not interested in regular scheduled appointments at this time; psychotherapy provided today    Counseled:  Counseled on illness and tx options as well as exercise for MH   Counseled on weight loss    Labs:   Labs reviewed from 8/8/19 and 6/24/19 with the patient    Return to Clinic: 1 month, sooner if needed     Yuriy Vaz MD

## 2019-11-16 DIAGNOSIS — K58.1 IRRITABLE BOWEL SYNDROME WITH CONSTIPATION: ICD-10-CM

## 2019-11-19 RX ORDER — LUBIPROSTONE 8 UG/1
CAPSULE, GELATIN COATED ORAL
Qty: 180 CAPSULE | Refills: 4 | Status: SHIPPED | OUTPATIENT
Start: 2019-11-19 | End: 2020-08-31 | Stop reason: SDUPTHER

## 2019-11-25 ENCOUNTER — EXTERNAL RECORD (OUTPATIENT)
Dept: OTHER | Age: 39
End: 2019-11-25

## 2019-12-13 ENCOUNTER — OFFICE VISIT (OUTPATIENT)
Dept: BARIATRICS | Facility: CLINIC | Age: 39
End: 2019-12-13
Payer: OTHER GOVERNMENT

## 2019-12-13 VITALS
DIASTOLIC BLOOD PRESSURE: 75 MMHG | BODY MASS INDEX: 24.35 KG/M2 | HEIGHT: 68 IN | SYSTOLIC BLOOD PRESSURE: 126 MMHG | HEART RATE: 82 BPM | WEIGHT: 160.69 LBS

## 2019-12-13 DIAGNOSIS — M54.9 CHRONIC BACK PAIN, UNSPECIFIED BACK LOCATION, UNSPECIFIED BACK PAIN LATERALITY: ICD-10-CM

## 2019-12-13 DIAGNOSIS — R63.4 WEIGHT LOSS: Primary | ICD-10-CM

## 2019-12-13 DIAGNOSIS — K21.9 GASTROESOPHAGEAL REFLUX DISEASE, ESOPHAGITIS PRESENCE NOT SPECIFIED: ICD-10-CM

## 2019-12-13 DIAGNOSIS — G89.29 CHRONIC BACK PAIN, UNSPECIFIED BACK LOCATION, UNSPECIFIED BACK PAIN LATERALITY: ICD-10-CM

## 2019-12-13 DIAGNOSIS — Z98.84 S/P LAPAROSCOPIC SLEEVE GASTRECTOMY: ICD-10-CM

## 2019-12-13 DIAGNOSIS — K58.2 IRRITABLE BOWEL SYNDROME WITH BOTH CONSTIPATION AND DIARRHEA: ICD-10-CM

## 2019-12-13 PROCEDURE — 99999 PR PBB SHADOW E&M-EST. PATIENT-LVL IV: CPT | Mod: PBBFAC,,, | Performed by: PHYSICIAN ASSISTANT

## 2019-12-13 PROCEDURE — 99214 OFFICE O/P EST MOD 30 MIN: CPT | Mod: PBBFAC | Performed by: PHYSICIAN ASSISTANT

## 2019-12-13 PROCEDURE — 99213 OFFICE O/P EST LOW 20 MIN: CPT | Mod: S$PBB,,, | Performed by: PHYSICIAN ASSISTANT

## 2019-12-13 PROCEDURE — 99999 PR PBB SHADOW E&M-EST. PATIENT-LVL IV: ICD-10-PCS | Mod: PBBFAC,,, | Performed by: PHYSICIAN ASSISTANT

## 2019-12-13 PROCEDURE — 99213 PR OFFICE/OUTPT VISIT, EST, LEVL III, 20-29 MIN: ICD-10-PCS | Mod: S$PBB,,, | Performed by: PHYSICIAN ASSISTANT

## 2019-12-13 NOTE — PROGRESS NOTES
BARIATRIC POST-OPERATIVE VISIT:    HPI:  Anat Parks is a 39 y.o. year old female presents for bpost op visit following s/p gastric sleeve.  she is doing well and tolerating the diet without difficulty.       Pt states that she is back at pain management states that her back has been killing her. States that she would [facility]     Still taking Prilosec tried to taper could not tolerate.      Denies: nausea, abdominal pain, fever, chills, dysphagia, chest pain, and shortness of breath.    Review of Systems   Constitutional: Negative for activity change, appetite change, chills and fever.   HENT: Negative for tinnitus.    Respiratory: Negative for cough, choking and shortness of breath.    Cardiovascular: Negative for chest pain and leg swelling.   Gastrointestinal: Negative for abdominal pain, constipation (intermittently), diarrhea, nausea and vomiting.   Genitourinary: Negative for dysuria and hematuria.   Musculoskeletal: Negative for gait problem and myalgias.   Skin: Negative for rash.   Neurological: Negative for dizziness and headaches.   Psychiatric/Behavioral: Negative for suicidal ideas. The patient is not nervous/anxious.        EXERCISE & VITAMINS:  See Bariatric Assessment   Adherent to bariatric vitamin regimen (switched to bariatric advantage for B12 and calcium citrate)     MEDICATIONS/ALLERGIES:  Have been reviewed.      DIET: Regular Bariatric Diet.   Protein oat meal   Egg frittata   Gluten free waffle with sugar free syrup   Peanut butter as a snack   Chicken  Premier and percy bar for snacks  2 shakes ( one after breakfast and one as a snack in between breakfast and lunch)  Powerade zero   6 oz cup of coffee every other day   Steak bits with cheese   Turkey scrambles  Chickpea pasta     See Dietician note from today for a more detailed assessment.      Physical Exam   Constitutional: She is oriented to person, place, and  time. She appears well-developed and well-nourished.   HENT:   Head: Normocephalic and atraumatic.   Eyes: Pupils are equal, round, and reactive to light. Conjunctivae and EOM are normal.   Neck: Normal range of motion. Neck supple. No JVD present. No thyromegaly present.   Cardiovascular: Normal rate, regular rhythm, normal heart sounds and intact distal pulses.   No murmur heard.  Pulmonary/Chest: Effort normal and breath sounds normal. No respiratory distress.   Abdominal: Soft. Bowel sounds are normal. She exhibits no mass. There is no tenderness (WHSS). There is no rebound.   Musculoskeletal: Normal range of motion. She exhibits no edema.   Neurological: She is alert and oriented to person, place, and time.   Skin: Skin is warm and dry. Capillary refill takes less than 2 seconds. No rash noted. No erythema.   Psychiatric: She has a normal mood and affect. Her behavior is normal. Judgment and thought content normal.       ASSESSMENT:  - Morbid obesity s/p sleeve gastrectomy on 11/08/18.  - Co-morbidities: GERD and IBS, dyspepsia, fatty liver   - Good Weight loss, 57# and 95% EWL  - Good Exercise routine  - Good Diet  - Good Vitamin regimen    PLAN:  - continue prilosec       - slow taper discussed       - Long term use of PPI discussed   - No lifting restrictions  - Miralax daily for constipation  - Emphasized the importance of regular exercise and adherence to bariatric diet to achieve maximum weight loss.  - Encouraged patient to start/continue regular exercise.  - Follow-up with dietician to advance diet.  - Continue daily vitamins and medications.  - RTC as scheduled   - Call the office for any issues.

## 2019-12-17 RX ORDER — AMITRIPTYLINE HYDROCHLORIDE 100 MG/1
100 TABLET ORAL NIGHTLY
Qty: 90 TABLET | Refills: 0 | Status: SHIPPED | OUTPATIENT
Start: 2019-12-17 | End: 2020-03-30

## 2020-01-09 RX ORDER — ALPRAZOLAM 1 MG/1
1 TABLET ORAL DAILY PRN
Qty: 30 TABLET | Refills: 1 | Status: SHIPPED | OUTPATIENT
Start: 2020-01-09 | End: 2020-01-13

## 2020-01-13 RX ORDER — ALPRAZOLAM 0.5 MG/1
0.5 TABLET ORAL 2 TIMES DAILY PRN
Qty: 60 TABLET | Refills: 1 | Status: SHIPPED | OUTPATIENT
Start: 2020-01-13 | End: 2020-02-10

## 2020-01-13 NOTE — TELEPHONE ENCOUNTER
Patient states she is supposed to be on Xanax 0.5 mg BID instead of Xanax 1 mg BID. Patient is asking if we can send in a new script. She is aware pharmacy will place new script on hold until she is due for a refill.

## 2020-01-23 DIAGNOSIS — F51.04 PSYCHOPHYSIOLOGICAL INSOMNIA: ICD-10-CM

## 2020-01-23 RX ORDER — ESZOPICLONE 3 MG/1
3 TABLET, FILM COATED ORAL NIGHTLY PRN
Qty: 90 TABLET | Refills: 1 | Status: SHIPPED | OUTPATIENT
Start: 2020-01-23 | End: 2020-01-23

## 2020-02-06 RX ORDER — ALPRAZOLAM 0.5 MG/1
0.5 TABLET ORAL 2 TIMES DAILY PRN
Qty: 60 TABLET | Refills: 2 | Status: CANCELLED | OUTPATIENT
Start: 2020-02-06 | End: 2020-03-07

## 2020-02-10 RX ORDER — ALPRAZOLAM 0.5 MG/1
0.5 TABLET ORAL 2 TIMES DAILY PRN
Qty: 60 TABLET | Refills: 1 | Status: SHIPPED | OUTPATIENT
Start: 2020-02-10 | End: 2020-04-20 | Stop reason: SDUPTHER

## 2020-03-01 DIAGNOSIS — K21.9 GASTROESOPHAGEAL REFLUX DISEASE, ESOPHAGITIS PRESENCE NOT SPECIFIED: ICD-10-CM

## 2020-03-02 RX ORDER — OMEPRAZOLE 40 MG/1
CAPSULE, DELAYED RELEASE ORAL
Qty: 180 CAPSULE | Refills: 4 | Status: SHIPPED | OUTPATIENT
Start: 2020-03-02 | End: 2020-08-09 | Stop reason: SDUPTHER

## 2020-03-03 ENCOUNTER — OFFICE VISIT (OUTPATIENT)
Dept: FAMILY MEDICINE | Facility: CLINIC | Age: 40
End: 2020-03-03
Payer: OTHER GOVERNMENT

## 2020-03-03 VITALS
BODY MASS INDEX: 24.66 KG/M2 | HEIGHT: 68 IN | SYSTOLIC BLOOD PRESSURE: 122 MMHG | HEART RATE: 86 BPM | RESPIRATION RATE: 16 BRPM | DIASTOLIC BLOOD PRESSURE: 86 MMHG | WEIGHT: 162.69 LBS

## 2020-03-03 DIAGNOSIS — R10.9 RIGHT FLANK PAIN: Primary | ICD-10-CM

## 2020-03-03 PROCEDURE — 80053 COMPREHEN METABOLIC PANEL: CPT

## 2020-03-03 PROCEDURE — 99214 OFFICE O/P EST MOD 30 MIN: CPT | Mod: PBBFAC | Performed by: FAMILY MEDICINE

## 2020-03-03 PROCEDURE — 99999 PR PBB SHADOW E&M-EST. PATIENT-LVL IV: CPT | Mod: PBBFAC,,, | Performed by: FAMILY MEDICINE

## 2020-03-03 PROCEDURE — 99999 PR PBB SHADOW E&M-EST. PATIENT-LVL IV: ICD-10-PCS | Mod: PBBFAC,,, | Performed by: FAMILY MEDICINE

## 2020-03-03 PROCEDURE — 81001 URINALYSIS AUTO W/SCOPE: CPT | Mod: PBBFAC | Performed by: FAMILY MEDICINE

## 2020-03-03 PROCEDURE — 99214 OFFICE O/P EST MOD 30 MIN: CPT | Mod: S$PBB,,, | Performed by: FAMILY MEDICINE

## 2020-03-03 PROCEDURE — 81000 URINALYSIS NONAUTO W/SCOPE: CPT | Mod: PBBFAC | Performed by: FAMILY MEDICINE

## 2020-03-03 PROCEDURE — 99214 PR OFFICE/OUTPT VISIT, EST, LEVL IV, 30-39 MIN: ICD-10-PCS | Mod: S$PBB,,, | Performed by: FAMILY MEDICINE

## 2020-03-03 RX ORDER — KETOROLAC TROMETHAMINE 10 MG/1
10 TABLET, FILM COATED ORAL EVERY 6 HOURS PRN
Qty: 10 TABLET | Refills: 0 | Status: SHIPPED | OUTPATIENT
Start: 2020-03-03 | End: 2020-03-08

## 2020-03-03 RX ORDER — CYCLOBENZAPRINE HCL 5 MG
5 TABLET ORAL 3 TIMES DAILY PRN
Qty: 15 TABLET | Refills: 0 | Status: SHIPPED | OUTPATIENT
Start: 2020-03-03 | End: 2020-11-25

## 2020-03-03 NOTE — PROGRESS NOTES
Subjective:       Patient ID: Anat Parks is a 39 y.o. female.    Chief Complaint: right side abd pain    HPI  39 year old female with h/o gastric bypass, ibs and s/p cholecystectomy secondary to biliary dyskinesia presents to clinic with c/o right side pain that started yesterday prior to sitting down for lunch. She says the pain felt like 'gas pains' that she has had before, but when her pain persisted this morning, she was concerned. She has had 2 normal BMs and continues to have issues. She takes norco as precribed by dr. dudley and took one that 'did not touch her pain.' She denies dysuria. She says she has a 'warmth' that comes over her when she feels the pain.    PMH, PSH, ALLERGIES, SH, FH reviewed in nurse's notes above  Medications reconciled in the nurse's notes      Review of Systems   Constitutional: Negative for chills and fever.   HENT: Negative for congestion, ear pain, postnasal drip, rhinorrhea, sore throat and trouble swallowing.    Eyes: Negative for redness and itching.   Respiratory: Negative for cough, shortness of breath and wheezing.    Cardiovascular: Negative for chest pain and palpitations.   Gastrointestinal: Negative for abdominal pain, diarrhea, nausea and vomiting.   Genitourinary: Positive for flank pain. Negative for dysuria and frequency.   Skin: Negative for rash.   Neurological: Negative for weakness and headaches.       Objective:      Physical Exam   Constitutional: She is oriented to person, place, and time. She appears well-developed. No distress.   HENT:   Head: Normocephalic and atraumatic.   Eyes: Pupils are equal, round, and reactive to light. Conjunctivae are normal.   Neck: Normal range of motion. Neck supple. No thyromegaly present.   Cardiovascular: Normal rate, regular rhythm, normal heart sounds and intact distal pulses.   Pulmonary/Chest: Effort normal and breath sounds normal. No respiratory distress. She has no wheezes.   Abdominal: Soft. Bowel  sounds are normal. There is tenderness (over right mid abdomen with deep palpation; + tenderness over liver border (felt on exam)).   Musculoskeletal: Normal range of motion. She exhibits no edema.   Lymphadenopathy:     She has no cervical adenopathy.   Neurological: She is alert and oriented to person, place, and time.   Skin: Skin is warm and dry. No rash noted.   Psychiatric: She has a normal mood and affect. Her behavior is normal.   Nursing note and vitals reviewed.       Assessment/Plan:       Problem List Items Addressed This Visit     None      Visit Diagnoses     Right flank pain    -  Primary    Relevant Medications    cyclobenzaprine (FLEXERIL) 5 MG tablet    ketorolac (TORADOL) 10 mg tablet    Other Relevant Orders    Comprehensive metabolic panel    POCT URINE DIPSTICK WITH MICROSCOPE, AUTOMATED    POCT Urine Sediment Exam    US Abdomen Limited        RTC if condition acutely worsens or any other concerns, otherwise RTC as scheduled

## 2020-03-04 ENCOUNTER — HOSPITAL ENCOUNTER (OUTPATIENT)
Dept: RADIOLOGY | Facility: HOSPITAL | Age: 40
Discharge: HOME | End: 2020-03-04
Attending: FAMILY MEDICINE
Payer: OTHER GOVERNMENT

## 2020-03-04 ENCOUNTER — LAB VISIT (OUTPATIENT)
Dept: LAB | Facility: HOSPITAL | Age: 40
End: 2020-03-04
Attending: NURSE PRACTITIONER
Payer: OTHER GOVERNMENT

## 2020-03-04 DIAGNOSIS — R74.8 ELEVATED LIVER ENZYMES: ICD-10-CM

## 2020-03-04 DIAGNOSIS — R10.9 RIGHT FLANK PAIN: ICD-10-CM

## 2020-03-04 LAB
ALBUMIN SERPL BCP-MCNC: 4.2 G/DL (ref 3.5–5.2)
ALBUMIN SERPL BCP-MCNC: 4.3 G/DL (ref 3.5–5.2)
ALP SERPL-CCNC: 45 U/L (ref 55–135)
ALP SERPL-CCNC: 46 U/L (ref 55–135)
ALT SERPL W/O P-5'-P-CCNC: 46 U/L (ref 10–44)
ALT SERPL W/O P-5'-P-CCNC: 50 U/L (ref 10–44)
ANION GAP SERPL CALC-SCNC: 7 MMOL/L (ref 8–16)
AST SERPL-CCNC: 26 U/L (ref 10–40)
AST SERPL-CCNC: 30 U/L (ref 10–40)
BACTERIA SPEC CULT: NORMAL
BILIRUB DIRECT SERPL-MCNC: 0.2 MG/DL (ref 0.1–0.3)
BILIRUB SERPL-MCNC: 0.4 MG/DL (ref 0.1–1)
BILIRUB SERPL-MCNC: 0.5 MG/DL (ref 0.1–1)
BILIRUB SERPL-MCNC: NORMAL MG/DL
BLOOD URINE, POC: NORMAL
BUN SERPL-MCNC: 10 MG/DL (ref 6–20)
CALCIUM SERPL-MCNC: 8.7 MG/DL (ref 8.7–10.5)
CASTS: NORMAL
CHLORIDE SERPL-SCNC: 109 MMOL/L (ref 95–110)
CO2 SERPL-SCNC: 23 MMOL/L (ref 23–29)
COLOR, POC UA: NORMAL
CREAT SERPL-MCNC: 0.8 MG/DL (ref 0.5–1.4)
CRYSTALS: NORMAL
EST. GFR  (AFRICAN AMERICAN): >60 ML/MIN/1.73 M^2
EST. GFR  (NON AFRICAN AMERICAN): >60 ML/MIN/1.73 M^2
GLUCOSE SERPL-MCNC: 89 MG/DL (ref 70–110)
GLUCOSE UR QL STRIP: NORMAL
KETONES UR QL STRIP: NORMAL
LEUKOCYTE ESTERASE URINE, POC: NORMAL
NITRITE, POC UA: NORMAL
PH, POC UA: 9
POTASSIUM SERPL-SCNC: 4.4 MMOL/L (ref 3.5–5.1)
PROT SERPL-MCNC: 6.7 G/DL (ref 6–8.4)
PROT SERPL-MCNC: 6.7 G/DL (ref 6–8.4)
PROTEIN, POC: NORMAL
RBC CELLS COUNTED: NORMAL
SODIUM SERPL-SCNC: 139 MMOL/L (ref 136–145)
SPECIFIC GRAVITY, POC UA: 1.01
UROBILINOGEN, POC UA: NORMAL
WHITE BLOOD CELLS: NORMAL

## 2020-03-04 PROCEDURE — 76705 ECHO EXAM OF ABDOMEN: CPT | Mod: 26,,, | Performed by: RADIOLOGY

## 2020-03-04 PROCEDURE — 80076 HEPATIC FUNCTION PANEL: CPT

## 2020-03-04 PROCEDURE — 36415 COLL VENOUS BLD VENIPUNCTURE: CPT

## 2020-03-04 PROCEDURE — 76705 ECHO EXAM OF ABDOMEN: CPT | Mod: TC

## 2020-03-04 PROCEDURE — 76705 US ABDOMEN LIMITED: ICD-10-PCS | Mod: 26,,, | Performed by: RADIOLOGY

## 2020-03-04 NOTE — PROGRESS NOTES
U/s is normal. Please let her know she can return to work.  Please find out how her pain is today.  mh

## 2020-03-05 ENCOUNTER — PATIENT MESSAGE (OUTPATIENT)
Dept: HEPATOLOGY | Facility: CLINIC | Age: 40
End: 2020-03-05

## 2020-03-08 DIAGNOSIS — R74.8 ELEVATED LIVER ENZYMES: Primary | ICD-10-CM

## 2020-03-30 RX ORDER — AMITRIPTYLINE HYDROCHLORIDE 100 MG/1
TABLET ORAL
Qty: 90 TABLET | Refills: 3 | Status: SHIPPED | OUTPATIENT
Start: 2020-03-30 | End: 2020-06-01 | Stop reason: SDUPTHER

## 2020-03-30 RX ORDER — BUPROPION HYDROCHLORIDE 100 MG/1
TABLET ORAL
Qty: 180 TABLET | Refills: 5 | Status: SHIPPED | OUTPATIENT
Start: 2020-03-30 | End: 2020-06-01 | Stop reason: ALTCHOICE

## 2020-03-31 RX ORDER — BUPROPION HYDROCHLORIDE 300 MG/1
300 TABLET ORAL DAILY
Qty: 90 TABLET | Refills: 4 | Status: SHIPPED | OUTPATIENT
Start: 2020-03-31 | End: 2020-06-01 | Stop reason: SDUPTHER

## 2020-04-18 ENCOUNTER — PATIENT MESSAGE (OUTPATIENT)
Dept: BARIATRICS | Facility: CLINIC | Age: 40
End: 2020-04-18

## 2020-04-20 RX ORDER — ALPRAZOLAM 0.5 MG/1
TABLET ORAL
Qty: 60 TABLET | OUTPATIENT
Start: 2020-04-20

## 2020-04-20 RX ORDER — ALPRAZOLAM 0.5 MG/1
0.5 TABLET ORAL 2 TIMES DAILY PRN
Qty: 60 TABLET | Refills: 1 | Status: SHIPPED | OUTPATIENT
Start: 2020-04-20 | End: 2020-06-01 | Stop reason: SDUPTHER

## 2020-04-28 NOTE — TELEPHONE ENCOUNTER
Telephone call.  Discussed her concerns.   Continue safety measures.  She denies additional questions.   Notes she is doing well.

## 2020-06-01 ENCOUNTER — OFFICE VISIT (OUTPATIENT)
Dept: PSYCHIATRY | Facility: CLINIC | Age: 40
End: 2020-06-01
Payer: OTHER GOVERNMENT

## 2020-06-01 VITALS
TEMPERATURE: 97 F | RESPIRATION RATE: 20 BRPM | SYSTOLIC BLOOD PRESSURE: 130 MMHG | WEIGHT: 169 LBS | HEART RATE: 98 BPM | BODY MASS INDEX: 25.61 KG/M2 | DIASTOLIC BLOOD PRESSURE: 80 MMHG | HEIGHT: 68 IN

## 2020-06-01 DIAGNOSIS — F33.42 MAJOR DEPRESSIVE DISORDER, RECURRENT, IN FULL REMISSION: ICD-10-CM

## 2020-06-01 DIAGNOSIS — F41.1 GAD (GENERALIZED ANXIETY DISORDER): Primary | ICD-10-CM

## 2020-06-01 DIAGNOSIS — F41.0 PANIC DISORDER: ICD-10-CM

## 2020-06-01 PROCEDURE — 99214 OFFICE O/P EST MOD 30 MIN: CPT | Mod: S$PBB,,, | Performed by: PSYCHIATRY & NEUROLOGY

## 2020-06-01 PROCEDURE — 99999 PR PBB SHADOW E&M-EST. PATIENT-LVL III: CPT | Mod: PBBFAC,,, | Performed by: PSYCHIATRY & NEUROLOGY

## 2020-06-01 PROCEDURE — 90833 PR PSYCHOTHERAPY W/PATIENT W/E&M, 30 MIN (ADD ON): ICD-10-PCS | Mod: ,,, | Performed by: PSYCHIATRY & NEUROLOGY

## 2020-06-01 PROCEDURE — 99999 PR PBB SHADOW E&M-EST. PATIENT-LVL III: ICD-10-PCS | Mod: PBBFAC,,, | Performed by: PSYCHIATRY & NEUROLOGY

## 2020-06-01 PROCEDURE — 99213 OFFICE O/P EST LOW 20 MIN: CPT | Mod: PBBFAC | Performed by: PSYCHIATRY & NEUROLOGY

## 2020-06-01 PROCEDURE — 99214 PR OFFICE/OUTPT VISIT, EST, LEVL IV, 30-39 MIN: ICD-10-PCS | Mod: S$PBB,,, | Performed by: PSYCHIATRY & NEUROLOGY

## 2020-06-01 PROCEDURE — 90833 PSYTX W PT W E/M 30 MIN: CPT | Mod: ,,, | Performed by: PSYCHIATRY & NEUROLOGY

## 2020-06-01 RX ORDER — AMITRIPTYLINE HYDROCHLORIDE 100 MG/1
100 TABLET ORAL NIGHTLY
Qty: 90 TABLET | Refills: 1 | Status: SHIPPED | OUTPATIENT
Start: 2020-06-01 | End: 2020-11-02

## 2020-06-01 RX ORDER — ESZOPICLONE 3 MG/1
3 TABLET, FILM COATED ORAL NIGHTLY PRN
COMMUNITY
Start: 2020-04-01 | End: 2020-11-02 | Stop reason: SDUPTHER

## 2020-06-01 RX ORDER — ALPRAZOLAM 0.5 MG/1
0.5 TABLET ORAL 2 TIMES DAILY PRN
Qty: 60 TABLET | Refills: 5 | Status: SHIPPED | OUTPATIENT
Start: 2020-06-01 | End: 2020-11-02 | Stop reason: SDUPTHER

## 2020-06-01 RX ORDER — BUPROPION HYDROCHLORIDE 300 MG/1
300 TABLET ORAL DAILY
Qty: 90 TABLET | Refills: 1 | Status: SHIPPED | OUTPATIENT
Start: 2020-06-01 | End: 2020-11-02 | Stop reason: SDUPTHER

## 2020-06-01 NOTE — PROGRESS NOTES
Outpatient Psychiatry Follow-Up Visit (MD/NP)    6/1/2020    Clinical Status of Patient:  Outpatient (Ambulatory)    Chief Complaint:  Anat Parks is a 40 y.o. female who presents today for follow-up of depression and anxiety.  Met with patient.      Interval History and Content of Current Session:  Interim Events/Subjective Report/Content of Current Session:   The patient was seen and her chart reviewed. Reviewed notes from Subhash Palmer PA-C at 12/13/2019  1:30 PM; Annette Loco MD at 3/3/2020  4:52 PM;     She has been compliant with treatment. She had no adverse reactions or side effects.  She tolerated the previous medication changes well without complications. The elavil was previously increased to 100 mg with good success.     Some new psychosocial stressors were presented today. Her marriage is going well. She continues to deal with family struggles (balancing work/home/life responsibilities), but this has been well managed. There was some issues with her extended family. Her  previously had a successful tx with the spinal stimulator for his chronic pain (some recent adjustments were made; some noted benefit)- he continues doing better with his pain overall, but he has continued PTSD-related issues at times; he is also trying to lose weight (seeing a weight  with good effect; doing well with weight loss goals). 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 Her youngest son is attending [employer] and is doing well; also has an internship. Her oldest left the [employer]- he is now employed and continues doing well. They are coping well with the quarantine.    She had some new medical issues since she was last seen. She and her family continue working on their diet and have been losing weight- she was approved for and completed the gastric by-pass surgery (it went well).  She previously had had a sleep  "study- was diagnosed with mild ERICH and recommended to loose weight to correct it (f/u study to be done in the future). She has had some hand weakness/numbness.     "I've been ok."  Overall, symptoms remain remains adequately controlled with periodic bouts of  irritability and panic attacks; they are much better controlled overall with rare breakthroughs; she had some psychiatric worsening with a pain med which has been resolving since stopping it. SHe feels that the xanax and Elavil are less effective than they were (liklye from changes in absorption from her bariatric surgery)..    Improved/Adequaetly Controlled Symptoms of Depression: no diminished mood, no loss of interest/anhedonia; no irritability, no diminished energy, no change in sleep (see below; improved overall), no change in appetite, no diminished concentration or cognition or indecisiveness, no PMA/R, no excessive guilt, denied hopelessness/worthlessness, no suicidal ideations    Improved/Adequaetly Controlled Sleep disturbance: no trouble with initiation (relieved with medications- may happen occasionally; may be getting some tolerance to the lunesta), no issues maintenance, no early morning awakening with inability to return to sleep; getting about 8-12 hours per night on average. She had not required  lunesta since the last appointment     No Suicidal/Homicidal ideations: no active/passive ideations, organized plans, or future intentions; no past attempts.     Improved/Adequaetly Symptoms of CHRISTINE: no excessive anxiety/worry/fear, no restlessness, no fatigue, no poor concentration, no irritability, no muscle tension, no sleep disturbance     Improved/Adequaetly symptoms of Panic Disorder: less panics attacks per 1 week- remain triggered but somewhat difficult to manage. They are managed with xanax.    No Symptoms of sexual disorders: no decrease in libido/desire (better); no issues and orgasmic (better); no pain    Overall, her IBS persists but is " controlled; Back pain is better but persists.     Weight: she is working on dieting to lose weight. Her weight on 10/31/17 was 254 lbs; weight on 2/7/18 was 240 lbs; weight on 4/30/18 was 241 lbs and is 223 lbs on 10/8/18. It was 163 lbs at the last appointment. She is at 168 lbs today      Previous medication trials include luvox (increased irritability).      PSYCHOTHERAPY ADD-ON +[id number]   16-37 minutes    Time: 16 minutes  Participants: Met with patient    Therapeutic Intervention Type: insight oriented psychotherapy, behavior modifying psychotherapy, supportive psychotherapy, interactive psychotherapy  Why chosen therapy is appropriate versus another modality: relevant to diagnosis, patient responds to this modality, evidence based practice    Target symptoms: depression, anxiety   Primary focus: anxiety, psychosocial stressors  Psychotherapeutic techniques: supportive techniques; psycho-education    Outcome monitoring methods: self-report, observation    Patient's response to intervention:  The patient's response to intervention is accepting.    Progress toward goals:  The patient's progress toward goals is fair .      Review of Systems   · PSYCHIATRIC: Pertinant items are noted in the narrative.  · CONSTITUTIONAL: No weight gain or loss.   · MUSCULOSKELETAL: No pain or stiffness of the joints.  · NEUROLOGIC: No weakness, sensory changes, seizures, confusion, memory loss, tremor or other abnormal movements.  · ENDOCRINE: No polydipsia or polyuria.  · INTEGUMENTARY: No rashes or lacerations.  · EYES: No exophthalmos, jaundice or blindness.  · ENT: No dizziness, tinnitus or hearing loss.  · RESPIRATORY: No shortness of breath.  · CARDIOVASCULAR: No tachycardia or chest pain.  · GASTROINTESTINAL: No nausea, vomiting, pain, constipation or diarrhea.  · GENITOURINARY: No frequency, dysuria or sexual dysfunction.  · HEMATOLOGIC/LYMPHATIC: No excessive bleeding, prolonged or excessive bleeding after dental  "extraction/injury.  · ALLERGIC/IMMUNOLOGIC: No allergic response to materials, foods or animals at this time.    Past Medical, Family and Social History: The patient's past medical, family and social history have been reviewed and updated as appropriate within the electronic medical record - see encounter notes.    Compliance: yes    Side effects: None    Risk Parameters:  Patient reports no suicidal ideation  Patient reports no homicidal ideation  Patient reports no self-injurious behavior  Patient reports no violent behavior           Exam (detailed: at least 9 elements; comprehensive: all 15 elements)   Constitutional  Vitals:  Most recent vital signs, dated less than 90 days prior to this appointment, were reviewed.   Vitals:    06/01/20 1023   BP: 130/80   Pulse: 98   Resp: 20   Temp: 97.3 °F (36.3 °C)   Weight: 76.6 kg (168 lb 15.7 oz)   Height: 5' 8" (1.727 m)     Body mass index is 25.69 kg/m².       General:  unremarkable, age appropriate, casually dressed, neatly groomed, overweight     Musculoskeletal  Muscle Strength/Tone:  no spasicity, no rigidity, no dyskinesia, no dystonia, no tremor   Gait & Station:  non-ataxic     Psychiatric  Speech:  no latency; no press, spontaneous   Mood & Affect:  steady, euthymic "ok"   congruent and appropriate, mood-congruent, full   Thought Process:  normal and logical, goal-directed   Associations:  intact   Thought Content:  normal, no suicidality, no homicidality, delusions, or paranoia   Insight:  intact, has awareness of illness   Judgement: behavior is adequate to circumstances, age appropriate   Orientation:  person, place, situation, time/date, day of week, month of year, year   Memory: intact for content of interview, able to remember recent events- yes, able to remember remote events- yes   Language: grossly intact, able to name, able to repeat   Attention Span & Concentration:  able to focus, completed tasks   Fund of Knowledge:  intact and appropriate to age " and level of education, familiar with aspects of current personal life, 4 of 4 recent presidents     Assessment and Diagnosis   Status/Progress: Based on the examination today, the patient's problem(s) is/are adequately but not ideally controlled.  New problems have not been presented today.   Co-morbidities are not complicating management of the primary condition.  There are no active rule-out diagnoses for this patient at this time.     General Impression:  MDD, recurrent, moderate, in full remission  CHRISTINE  Panic Disorder without agoraphobia   Insomnia    Psychosocial stressors    IBS  GERD  Ovarian cysts  Overweight, s/p gastric bypass  S/p Gastric bypass       Intervention/Counseling/Treatment Plan   Treatment Plan/Recommendations:     Medications:  -Continue Wellbutrin  mg po qDay for depression and off-label CHRISTINE  -Continue Elavil at 100 mg po q HS for adjunctive depression, anxiety and IBS  -Continue Xanax 1 mg po q Day prn anxiety  -Continue Probiotic for adjunctive depression     Discussed diagnosis, risks and benefits of proposed treatment vs alternative treatments vs no treatment, and potential side effects of these treatments.  The patient expresses understanding of the above and displays the capacity to agree with this treatment given said understanding.  Patient also agrees that, currently, the benefits outweigh the risks and would like to pursue treatment at this time.    Therapy:  Pt is not interested in regular scheduled appointments at this time; psychotherapy provided today    Counseled:  Counseled on illness and tx options as well as exercise for MH   Counseled on weight loss    Labs:   Labs reviewed from 3/3/20 and 3/4/20 with the patient    Return to Clinic: 1 month, sooner if needed     Yuriy Vaz MD

## 2020-08-28 ENCOUNTER — PATIENT MESSAGE (OUTPATIENT)
Dept: INTERNAL MEDICINE | Facility: CLINIC | Age: 40
End: 2020-08-28

## 2020-08-28 DIAGNOSIS — K58.1 IRRITABLE BOWEL SYNDROME WITH CONSTIPATION: ICD-10-CM

## 2020-08-29 ENCOUNTER — LAB VISIT (OUTPATIENT)
Dept: LAB | Facility: HOSPITAL | Age: 40
End: 2020-08-29
Payer: OTHER GOVERNMENT

## 2020-08-29 DIAGNOSIS — R74.8 ELEVATED LIVER ENZYMES: ICD-10-CM

## 2020-08-29 LAB
ALBUMIN SERPL BCP-MCNC: 4.2 G/DL (ref 3.5–5.2)
ALP SERPL-CCNC: 40 U/L (ref 55–135)
ALT SERPL W/O P-5'-P-CCNC: 42 U/L (ref 10–44)
AST SERPL-CCNC: 24 U/L (ref 10–40)
BILIRUB DIRECT SERPL-MCNC: 0.2 MG/DL (ref 0.1–0.3)
BILIRUB SERPL-MCNC: 0.4 MG/DL (ref 0.1–1)
PROT SERPL-MCNC: 6.8 G/DL (ref 6–8.4)

## 2020-08-29 PROCEDURE — 80076 HEPATIC FUNCTION PANEL: CPT

## 2020-08-29 PROCEDURE — 36415 COLL VENOUS BLD VENIPUNCTURE: CPT

## 2020-08-31 ENCOUNTER — PATIENT OUTREACH (OUTPATIENT)
Dept: ADMINISTRATIVE | Facility: OTHER | Age: 40
End: 2020-08-31

## 2020-08-31 RX ORDER — LUBIPROSTONE 8 UG/1
CAPSULE ORAL
Qty: 180 CAPSULE | Refills: 4 | Status: SHIPPED | OUTPATIENT
Start: 2020-08-31 | End: 2021-01-27

## 2020-08-31 NOTE — TELEPHONE ENCOUNTER
I have refilled your amitiza. I understand that it is difficult to take off for all the specialist and then just for routine visit but routine visits are important. You have had a chemistry and liver panels but have not had blood counts,  cholesterol and thyroid function test which are routinely done yearly but you have not had since 5/2019. The routine visit is also when we review your health maint. It looks like you are due for mammo (last one here 2/2019 and should be yearly) you would also be eligable for pneumonia vaccine with your liver diease.

## 2020-09-01 ENCOUNTER — OFFICE VISIT (OUTPATIENT)
Dept: HEPATOLOGY | Facility: CLINIC | Age: 40
End: 2020-09-01
Payer: OTHER GOVERNMENT

## 2020-09-01 VITALS
OXYGEN SATURATION: 100 % | BODY MASS INDEX: 25.83 KG/M2 | HEIGHT: 68 IN | SYSTOLIC BLOOD PRESSURE: 116 MMHG | WEIGHT: 170.44 LBS | RESPIRATION RATE: 19 BRPM | HEART RATE: 99 BPM | DIASTOLIC BLOOD PRESSURE: 83 MMHG

## 2020-09-01 DIAGNOSIS — R74.8 ELEVATED LIVER ENZYMES: ICD-10-CM

## 2020-09-01 DIAGNOSIS — K76.0 NAFLD (NONALCOHOLIC FATTY LIVER DISEASE): Primary | ICD-10-CM

## 2020-09-01 PROCEDURE — 99214 OFFICE O/P EST MOD 30 MIN: CPT | Mod: PBBFAC | Performed by: NURSE PRACTITIONER

## 2020-09-01 PROCEDURE — 99214 PR OFFICE/OUTPT VISIT, EST, LEVL IV, 30-39 MIN: ICD-10-PCS | Mod: S$PBB,,, | Performed by: NURSE PRACTITIONER

## 2020-09-01 PROCEDURE — 99999 PR PBB SHADOW E&M-EST. PATIENT-LVL IV: ICD-10-PCS | Mod: PBBFAC,,, | Performed by: NURSE PRACTITIONER

## 2020-09-01 PROCEDURE — 99999 PR PBB SHADOW E&M-EST. PATIENT-LVL IV: CPT | Mod: PBBFAC,,, | Performed by: NURSE PRACTITIONER

## 2020-09-01 PROCEDURE — 99214 OFFICE O/P EST MOD 30 MIN: CPT | Mod: S$PBB,,, | Performed by: NURSE PRACTITIONER

## 2020-09-01 NOTE — PATIENT INSTRUCTIONS
1. Liver enzymes look good right now, liver is working well.    2. Continue healthy lifestyle for management of fatty liver    3. Recommend labs to monitor liver enzymes 1-2 times per year with your other providers. Let me know if liver enzymes increase again and we can reassess.

## 2020-09-01 NOTE — PROGRESS NOTES
[facility] - Established Patient    Last Clinic Visit: 6/25/19    CHIEF COMPLAINT: Follow-up for fatty liver     HPI: This is a 40 y.o. White female here for follow-up for fatty liver. Fatty liver first noted on imaging in 2015 (CT). BMI ~39 at this time.    Initially followed by Dr. Robles in bariatric medicine and was able to lose ~50 lb. She underwent laparoscopic Adilson-en-Y gastric bypass (11/8/18) and has lost an additional 50 lb. No liver biopsy done at this time.    Her transaminases started increasing Jan 2019, ALT>AST. ALT up to 111.    Serological workup has been negative for Grady's, alpha-1 antitrypsin deficiency, hemochromatosis, autoimmune etiology, and viral hepatitis.    Fibroscan 6/2019 = F0-F1, <S1    No concerning medications or supplements.  Rare alcohol use.    Liver enzymes essentially normal right now: AST 24, ALT 42    Updates on risk factors for fatty liver:     · Weight -- Body mass index is 25.91 kg/m². Weight is up ~5 lb.                       · Cholesterol -- well controlled                                     · BP -- well controlled    She feels well, no complaints.   Denies symptoms of hepatic decompensation including jaundice, ascites, cognitive problems to suggest hepatic encephalopathy, or GI bleeding.            Review of patient's allergies indicates:  No Known Allergies     Current Outpatient Medications on File Prior to Visit   Medication Sig Dispense Refill    ALPRAZolam (XANAX) 0.5 MG tablet Take 1 tablet (0.5 mg total) by mouth 2 (two) times daily as needed for Anxiety. 60 tablet 5    amitriptyline (ELAVIL) 100 MG tablet Take 1 tablet (100 mg total) by mouth every evening. 90 tablet 1    b complex vitamins capsule Take 1 capsule by mouth once daily.      BIOTIN ORAL Take 200 mg by mouth once daily.       buPROPion (WELLBUTRIN XL) 300 MG 24 hr tablet Take 1 tablet (300 mg total) by mouth once daily. 90 tablet 1    CALCIUM CITRATE ORAL Take 1 tablet by  mouth 3 (three) times daily.      dicyclomine (BENTYL) 20 mg tablet TAKE 1 TABLET FOUR TIMES A DAY BEFORE MEALS AND NIGHTLY 360 tablet 0    eszopiclone (LUNESTA) 3 mg Tab Take 3 mg by mouth nightly as needed.      fluconazole (DIFLUCAN) 200 MG Tab Take one tablet (200 mg) as needed for vulvar irritation.May take one tablet every 3 days as needed.Do not exceed 3 tablets consecutively. 30 tablet 0    HYDROcodone-acetaminophen (NORCO) 5-325 mg per tablet Take 1 tablet by mouth three times daily as needed. 90 tablet 0    lubiprostone (AMITIZA) 8 MCG Cap TAKE 1 CAPSULE TWICE A DAY WITH MEALS 180 capsule 4    MAGNESIUM ORAL Take 400 mg by mouth 3 (three) times daily.       multivitamin/iron/folic acid (CENTRUM COMPLETE ORAL) Take by mouth 2 (two) times daily.       omeprazole (PRILOSEC) 40 MG capsule Take 1 capsule (40 mg total) by mouth every morning. 180 capsule 4    ondansetron (ZOFRAN-ODT) 8 MG TbDL Dissolve 1 tablet  (8 mg total) under the tounge every 8 (six) hours as needed. 15 tablet 0    topiramate (TOPAMAX) 50 MG tablet Take 1 tablet by mouth twice a day. 180 tablet 0    cyclobenzaprine (FLEXERIL) 5 MG tablet Take 1 tablet (5 mg total) by mouth 3 (three) times daily as needed for Muscle spasms. (Patient not taking: Reported on 2020) 15 tablet 0     No current facility-administered medications on file prior to visit.          PMHX:  has a past medical history of Abdominal pain, other specified site, Acid reflux, Anxiety, Bilateral ovarian cysts, Bulging lumbar disc, DDD (degenerative disc disease), lumbar, Degenerative disc disease (2017), Depression, Fatigue, Fatty liver, Fibroid tumor, Heartburn, IBS (irritable bowel syndrome), Menorrhagia, and Ulcer ().    PSHX:  has a past surgical history that includes Tubal ligation (2000);  section (3/1997; 2000); gall bladder (2011); Colonoscopy (); Esophagogastroduodenoscopy (); Esophagogastroduodenoscopy ();  Hysterectomy (2016); Cholecystectomy (2009); Esophagogastroduodenoscopy (N/A, 8/15/2018); Laparoscopic gastroenterostomy (N/A, 2018); and Upper gastrointestinal endoscopy.    FAMILY HISTORY:   Family History   Problem Relation Age of Onset    Cancer Mother         cervical CA    Bipolar disorder Mother     Drug abuse Mother     Ovarian cancer Mother     Kidney disease Father     Alcohol abuse Father     Drug abuse Brother     Colon cancer Maternal Grandmother     Cancer Maternal Grandmother     Obesity Maternal Grandmother     Stroke Maternal Grandmother     Cancer Maternal Aunt         uterine CA    Breast cancer Paternal Aunt     Cancer Paternal Aunt         breast CA    Cirrhosis Neg Hx        SOCIAL HISTORY:   Social History     Tobacco Use   Smoking Status Former Smoker    Packs/day: 0.50    Years: 6.00    Pack years: 3.00    Types: Cigarettes    Start date: 11/10/1996    Quit date: 2012    Years since quittin.2   Smokeless Tobacco Never Used   Tobacco Comment    quit 6 years ago       Social History     Substance and Sexual Activity   Alcohol Use Yes    Frequency: 2-4 times a month       Social History     Substance and Sexual Activity   Drug Use No         Review of Systems   Constitutional: Negative for appetite change, chills, fatigue, fever and unexpected weight change.   Eyes: Negative for visual disturbance.   Respiratory: Negative for cough and shortness of breath.    Cardiovascular: Negative for chest pain, palpitations and leg swelling.   Gastrointestinal: Negative for abdominal distention, abdominal pain, blood in stool, constipation, diarrhea, nausea and vomiting. No change in stool color.  Genitourinary: Negative for dysuria and hematuria. Denies dark urine.   Musculoskeletal: Negative for arthralgias, gait problem, joint swelling and myalgias.   Skin: Negative for color change, rash and wound. Denies itching.   Neurological: Negative for dizziness,  "tremors, speech difficulty, and headaches.   Hematological: Does not bruise/bleed easily.   Psychiatric/Behavioral: Negative for confusion, decreased concentration and sleep disturbance. Denies memory loss. Denies depression. The patient is not nervous/anxious.        Physical Exam   Constitutional: Well-nourished. No distress. Alert and oriented to person, place, and time.  Eyes: No scleral icterus.   Cardiovascular: Normal rate, regular rhythm and normal heart sounds. No murmur heard.  Pulmonary/Chest: Respiratory effort and breath sounds normal. No respiratory distress.   Abdominal: Soft, non-tender. No distension; no ascites appreciated. There is no palpable hepatosplenomegaly. No hernia or mass.   Musculoskeletal: No edema.   Neurological: No tremor. Coordination and gait normal. No asterixis.    Skin: Skin is warm and dry. No rash or erythema. No jaundice. No telangiectasias or palmar erythema noted.  Psychiatric: Normal mood and affect. Speech, behavior, and thought content normal. No depression or anxiety noted.       /83 (BP Location: Right arm, Patient Position: Sitting, BP Method: Medium (Automatic))   Pulse 99   Resp 19   Ht 5' 8" (1.727 m)   Wt 77.3 kg (170 lb 6.7 oz)   LMP 11/16/2016 (Exact Date)   SpO2 100%   BMI 25.91 kg/m²         LABS:  Lab Results   Component Value Date    WBC 5.76 05/24/2019    HGB 13.1 05/24/2019    HCT 40.7 05/24/2019     05/24/2019     03/03/2020    K 4.4 03/03/2020    CREATININE 0.8 03/03/2020    ALT 42 08/29/2020    AST 24 08/29/2020    ALKPHOS 40 (L) 08/29/2020    BILITOT 0.4 08/29/2020    BILIDIR 0.2 08/29/2020    ALBUMIN 4.2 08/29/2020    SMOOTHMUSCAB Negative 1:40 06/24/2019    AMAIFA Negative 1:40 06/24/2019    IGGSERUM 1002 06/24/2019    ANASCREEN Negative <1:160 06/24/2019    FERRITIN 173 06/24/2019    FESATURATED 23 06/24/2019    ZEGYL3YMVLEK MM 06/24/2019    ZUUMP0WLNSHS 128 06/24/2019    CERULOPLSM 28.0 06/24/2019    CHOL 139 05/24/2019    " TRIG 61 05/24/2019    LDLCALC 63.8 05/24/2019    HGBA1C 5.2 10/24/2018       No results found for: HEPAIGG    Hepatitis B Surface Ag   Date Value Ref Range Status   06/24/2019 Negative  Final     No results found for: HEPBCAB  No results found for: HEPBSAB  Hepatitis C Ab   Date Value Ref Range Status   06/24/2019 Negative  Final     Immunization History   Administered Date(s) Administered    Influenza 10/08/2018    Influenza - Quadrivalent 10/19/2016, 11/09/2017    Influenza - Quadrivalent - PF *Preferred* (6 months and older) 09/30/2014, 09/28/2018, 10/01/2019    Tdap 10/19/2016          DIAGNOSTIC STUDIES:  ABD. US   Results for orders placed during the hospital encounter of 03/04/20   US Abdomen Limited    Addendum  Please note that the gallbladder is surgically removed.   Electronically signed by: Yaa Wan MD Date:    03/04/2020 Time:    10:16      Yaa Wan MD 3/4/2020 10:19 AM          Narrative EXAMINATION:  US ABDOMEN LIMITED    CLINICAL HISTORY:  Unspecified abdominal pain    TECHNIQUE:  Limited ultrasound of the right upper quadrant of the abdomen (including pancreas, liver, gallbladder, common bile duct, and right kidney) was performed.    COMPARISON:  None    FINDINGS:  Liver: Normal in size. The liver demonstrates homogenous echotexture. no focal hepatic lesions are seen.    Biliary system: The gallbladder demonstrates no evidence of calculi. No gallbladder wall thickening.  No sonographic Black sign. No pericholecystic fluid. The common duct is not dilated, measuring 0.28 cm. No intrahepatic ductal dilatation.    Pancreas: The visualized portions of pancreas appear normal    Right kidney: Normal in size measuring 10.8 cmwith no hydronephrosis, mass, or calculi.    Vascular: The portions of the aorta, vena cava, and portal vein appear free of acute abnormality.      Impression 1.  No acute abnormality identified.      Electronically signed by: Yaa Wan  MD  Date:    03/04/2020  Time:    08:49           ASSESSMENT / PLAN:  40 y.o. White female with:    1. NAFLD  -- Labs and Fibroscan reviewed.  -- Discussed importance of lifestyle modifications including healthy diet and adequate physical activity to achieve and maintain ideal body weight.   -- Low carbohydrate, low sugar diet.  -- Maintain control of blood pressure, cholesterol, and blood sugar as these are risk factors for fatty liver    *If statins are being considered for HLD, statins are safe in patients with liver disease. Statins can be used to treat dyslipidemia in patients with both NAFLD and [place].    2. Elevated liver enzymes  -- Essentially normal at this time; may have been due to rapid weight loss post bariatric surgery. Serologic workup unrevealing. NAFLD risk factors well controlled.           Recommend routine labs to monitor liver enzymes 1-2 x year with PCP. Can return to hepatology if liver enzymes increase again. No follow-up scheduled at this time.        EDUCATION / DISCUSSION:   We discussed the manifestations of fatty liver. At this time there is no FDA approved therapy for fatty liver. The patient and I discussed the importance of lifestyle modifications such as diet, exercise, and weight loss for management of fatty liver. We reviewed modification of risk factors such as hypertension, hyperlipidemia, and diabetes mellitus / impaired fasting glucose. Discussed that excessive alcohol consumption can also cause fatty liver. We discussed a low carb, low sugar diet and a goal of 30 minutes of exercise 5 times per week. Patient is aware that fatty liver can progress to steatohepatitis and possibly to cirrhosis, putting one at increased risk for liver cancer or liver failure.           Thank you for allowing me to participate in the care of Anat Mccallumberry    Karen Reid, ROMELP-C  Hepatology and Liver Transplant

## 2020-09-08 ENCOUNTER — TELEPHONE (OUTPATIENT)
Dept: INTERNAL MEDICINE | Facility: CLINIC | Age: 40
End: 2020-09-08

## 2020-09-08 DIAGNOSIS — Z13.220 SCREENING FOR HYPERLIPIDEMIA: ICD-10-CM

## 2020-09-08 DIAGNOSIS — Z12.31 ENCOUNTER FOR SCREENING MAMMOGRAM FOR BREAST CANCER: Primary | ICD-10-CM

## 2020-09-08 DIAGNOSIS — Z13.29 SCREENING FOR HYPOTHYROIDISM: ICD-10-CM

## 2020-09-08 DIAGNOSIS — Z00.00 HEALTHCARE MAINTENANCE: ICD-10-CM

## 2020-10-19 ENCOUNTER — TELEPHONE (OUTPATIENT)
Dept: INTERNAL MEDICINE | Facility: CLINIC | Age: 40
End: 2020-10-19

## 2020-10-19 NOTE — TELEPHONE ENCOUNTER
PA received for lubiprostone (Amitiza) 8 MCG Cap. States that she has an appointment scheduled for 11/25. She said that she spoke with Azalia at length and that Azalia said she would fill the medication. I told her that Azalia did send in the medication, but that we received a PA that needs to be completed prior to the medication being able to be filled. She said if there are questions that need to be filled out that Azalia can send them to her via [facility] and she will answer everything. She states she has taken off work on the day that the appointment is scheduled and she has 3 appointments that day.

## 2020-10-20 NOTE — TELEPHONE ENCOUNTER
Last seen 1/30/2019  At that time she was on amatiza as well as 1 1/2 cap of mirilax and bemtyl. She has failed OTC meds colace. Appt scheduled next month. I completed the PA form and pt has scheduled f/u next month

## 2020-11-02 RX ORDER — ESZOPICLONE 3 MG/1
3 TABLET, FILM COATED ORAL NIGHTLY PRN
Qty: 30 TABLET | Refills: 2 | Status: CANCELLED | OUTPATIENT
Start: 2020-11-02

## 2020-11-02 RX ORDER — ALPRAZOLAM 0.5 MG/1
0.5 TABLET ORAL 2 TIMES DAILY PRN
Qty: 60 TABLET | Refills: 2 | Status: SHIPPED | OUTPATIENT
Start: 2020-11-02 | End: 2021-02-10 | Stop reason: SDUPTHER

## 2020-11-02 RX ORDER — BUPROPION HYDROCHLORIDE 300 MG/1
300 TABLET ORAL DAILY
Qty: 90 TABLET | Refills: 0 | Status: SHIPPED | OUTPATIENT
Start: 2020-11-02 | End: 2021-01-25

## 2020-11-02 RX ORDER — ESZOPICLONE 3 MG/1
3 TABLET, FILM COATED ORAL NIGHTLY PRN
Qty: 30 TABLET | Refills: 2 | Status: SHIPPED | OUTPATIENT
Start: 2020-11-02 | End: 2021-02-10 | Stop reason: SDUPTHER

## 2020-11-02 RX ORDER — ESZOPICLONE 3 MG/1
3 TABLET, FILM COATED ORAL NIGHTLY PRN
Qty: 90 TABLET | Refills: 0 | OUTPATIENT
Start: 2020-11-02

## 2020-11-02 RX ORDER — ALPRAZOLAM 0.5 MG/1
0.5 TABLET ORAL 2 TIMES DAILY PRN
Qty: 60 TABLET | Refills: 2 | Status: CANCELLED | OUTPATIENT
Start: 2020-11-02 | End: 2020-12-02

## 2020-11-02 RX ORDER — AMITRIPTYLINE HYDROCHLORIDE 75 MG/1
75 TABLET ORAL NIGHTLY
Qty: 90 TABLET | Refills: 0 | Status: SHIPPED | OUTPATIENT
Start: 2020-11-02 | End: 2021-01-27

## 2020-11-09 ENCOUNTER — PATIENT MESSAGE (OUTPATIENT)
Dept: INTERNAL MEDICINE | Facility: CLINIC | Age: 40
End: 2020-11-09

## 2020-11-09 ENCOUNTER — TELEPHONE (OUTPATIENT)
Dept: INTERNAL MEDICINE | Facility: CLINIC | Age: 40
End: 2020-11-09

## 2020-11-09 NOTE — TELEPHONE ENCOUNTER
----- Message from Fannie Pruitt sent at 2020 11:25 AM CST -----  Contact: Self  Anat Parks  MRN: [id number]  : 1980  PCP: Azalia Muir  Home Phone      [phone]  Work Phone      Not on file.  Mobile          [phone]      MESSAGE:   She was told that the following medication needed a PA and she needed an appointment to see provider.  She had an appointment to see Azalia but it was cancelled.  She rescheduled to see Dr. Zimmer on the same day because she had taken that day off work.  Would like to speak to nurse about getting this prescription refilled because she is in pain. Please call to advise.    lubiprostone (AMITIZA) 8 MCG Cap        [facility] - [address]    [phone]

## 2020-11-11 ENCOUNTER — TELEPHONE (OUTPATIENT)
Dept: INTERNAL MEDICINE | Facility: CLINIC | Age: 40
End: 2020-11-11

## 2020-11-11 NOTE — TELEPHONE ENCOUNTER
PA request came in for Amitiza 8 mg. Called [facility]. PA was done and faxed on 10/20/2020 and then refaxed on 11/9/2020. I called because we received another PA for the same medication that was already done. It was a new case number. Spoke with pharmacy. Did PA over the phone and it was approved. Approval date range is 10/12/2020-11/11/2021. Reference # [id number].

## 2020-11-23 ENCOUNTER — TELEPHONE (OUTPATIENT)
Dept: ADMINISTRATIVE | Facility: HOSPITAL | Age: 40
End: 2020-11-23

## 2020-11-25 ENCOUNTER — HOSPITAL ENCOUNTER (OUTPATIENT)
Dept: RADIOLOGY | Facility: HOSPITAL | Age: 40
Discharge: HOME | End: 2020-11-25
Attending: INTERNAL MEDICINE
Payer: OTHER GOVERNMENT

## 2020-11-25 ENCOUNTER — OFFICE VISIT (OUTPATIENT)
Dept: INTERNAL MEDICINE | Facility: CLINIC | Age: 40
End: 2020-11-25
Payer: OTHER GOVERNMENT

## 2020-11-25 ENCOUNTER — HOSPITAL ENCOUNTER (OUTPATIENT)
Dept: RADIOLOGY | Facility: HOSPITAL | Age: 40
Discharge: HOME | End: 2020-11-25
Attending: NURSE PRACTITIONER
Payer: OTHER GOVERNMENT

## 2020-11-25 VITALS
WEIGHT: 167.75 LBS | RESPIRATION RATE: 16 BRPM | BODY MASS INDEX: 25.42 KG/M2 | HEIGHT: 68 IN | SYSTOLIC BLOOD PRESSURE: 106 MMHG | OXYGEN SATURATION: 100 % | DIASTOLIC BLOOD PRESSURE: 68 MMHG | HEART RATE: 78 BPM

## 2020-11-25 DIAGNOSIS — Z12.31 ENCOUNTER FOR SCREENING MAMMOGRAM FOR BREAST CANCER: ICD-10-CM

## 2020-11-25 DIAGNOSIS — Z00.00 WELLNESS EXAMINATION: Primary | ICD-10-CM

## 2020-11-25 DIAGNOSIS — M79.671 RIGHT FOOT PAIN: ICD-10-CM

## 2020-11-25 DIAGNOSIS — K58.2 IRRITABLE BOWEL SYNDROME WITH BOTH CONSTIPATION AND DIARRHEA: ICD-10-CM

## 2020-11-25 PROCEDURE — 77067 SCR MAMMO BI INCL CAD: CPT | Mod: TC

## 2020-11-25 PROCEDURE — 77063 MAMMO DIGITAL SCREENING BILAT WITH TOMOSYNTHESIS_CAD: ICD-10-PCS | Mod: 26,,, | Performed by: RADIOLOGY

## 2020-11-25 PROCEDURE — 99213 OFFICE O/P EST LOW 20 MIN: CPT | Mod: S$PBB,,, | Performed by: INTERNAL MEDICINE

## 2020-11-25 PROCEDURE — 99999 PR PBB SHADOW E&M-EST. PATIENT-LVL V: ICD-10-PCS | Mod: PBBFAC,,, | Performed by: INTERNAL MEDICINE

## 2020-11-25 PROCEDURE — 99213 PR OFFICE/OUTPT VISIT, EST, LEVL III, 20-29 MIN: ICD-10-PCS | Mod: S$PBB,,, | Performed by: INTERNAL MEDICINE

## 2020-11-25 PROCEDURE — 99215 OFFICE O/P EST HI 40 MIN: CPT | Mod: PBBFAC | Performed by: INTERNAL MEDICINE

## 2020-11-25 PROCEDURE — 77067 SCR MAMMO BI INCL CAD: CPT | Mod: 26,,, | Performed by: RADIOLOGY

## 2020-11-25 PROCEDURE — 99999 PR PBB SHADOW E&M-EST. PATIENT-LVL V: CPT | Mod: PBBFAC,,, | Performed by: INTERNAL MEDICINE

## 2020-11-25 PROCEDURE — 73630 X-RAY EXAM OF FOOT: CPT | Mod: 26,RT,, | Performed by: RADIOLOGY

## 2020-11-25 PROCEDURE — 77063 BREAST TOMOSYNTHESIS BI: CPT | Mod: 26,,, | Performed by: RADIOLOGY

## 2020-11-25 PROCEDURE — 73630 XR FOOT COMPLETE 3 VIEW RIGHT: ICD-10-PCS | Mod: 26,RT,, | Performed by: RADIOLOGY

## 2020-11-25 PROCEDURE — 77067 MAMMO DIGITAL SCREENING BILAT WITH TOMOSYNTHESIS_CAD: ICD-10-PCS | Mod: 26,,, | Performed by: RADIOLOGY

## 2020-11-25 PROCEDURE — 73630 X-RAY EXAM OF FOOT: CPT | Mod: TC,RT

## 2020-11-25 RX ORDER — DICYCLOMINE HYDROCHLORIDE 20 MG/1
TABLET ORAL
Qty: 60 TABLET | Refills: 1 | Status: SHIPPED | OUTPATIENT
Start: 2020-11-25 | End: 2020-12-07

## 2020-11-25 NOTE — PROGRESS NOTES
Subjective:       Patient ID: Anat Parks is a 40 y.o. female.    Chief Complaint: Annual Exam    Anat Parks is a 40 y.o. female  Here for     Review of Systems   Constitutional: Positive for activity change and unexpected weight change. Negative for chills, fatigue and fever.   HENT: Negative for congestion, ear pain, hearing loss, postnasal drip, rhinorrhea, sinus pressure, sneezing, sore throat and trouble swallowing.    Eyes: Negative for discharge and visual disturbance.   Respiratory: Negative for cough, chest tightness, shortness of breath and wheezing.    Cardiovascular: Negative.  Negative for chest pain and palpitations.   Gastrointestinal: Positive for abdominal pain, constipation and nausea. Negative for blood in stool, diarrhea and vomiting.        Needs bentyl   Endocrine: Negative for polydipsia and polyuria.   Genitourinary: Negative for difficulty urinating, dysuria, flank pain, hematuria and menstrual problem.   Musculoskeletal: Positive for arthralgias and joint swelling. Negative for neck pain.   Skin: Negative.    Neurological: Positive for weakness and headaches. Negative for dizziness.   Psychiatric/Behavioral: Negative for behavioral problems, confusion and dysphoric mood.       Objective:      Physical Exam  Vitals signs and nursing note reviewed.   Constitutional:       Appearance: She is well-developed.   HENT:      Head: Normocephalic and atraumatic.      Right Ear: External ear normal.      Left Ear: External ear normal.      Nose: Nose normal.   Eyes:      Conjunctiva/sclera: Conjunctivae normal.      Pupils: Pupils are equal, round, and reactive to light.   Neck:      Musculoskeletal: Normal range of motion and neck supple.      Thyroid: No thyromegaly.   Cardiovascular:      Rate and Rhythm: Normal rate and regular rhythm.      Heart sounds: Normal heart sounds. No murmur.   Pulmonary:      Effort: Pulmonary effort is normal. No respiratory distress.       Breath sounds: Normal breath sounds. No stridor. No wheezing or rales.   Abdominal:      General: Bowel sounds are normal. There is no distension.      Palpations: Abdomen is soft. There is no mass.      Tenderness: There is no abdominal tenderness. There is no guarding.      Comments: generalized tenderness- not localized; + Bowel sounds   Musculoskeletal: Normal range of motion.        Feet:    Feet:      Comments: R big toe swollen   Lymphadenopathy:      Cervical: No cervical adenopathy.   Skin:     General: Skin is warm and dry.      Capillary Refill: Capillary refill takes less than 2 seconds.   Neurological:      Mental Status: She is alert and oriented to person, place, and time.      Cranial Nerves: No cranial nerve deficit.   Psychiatric:         Behavior: Behavior normal.         Thought Content: Thought content normal.         Judgment: Judgment normal.         Assessment:       1. Wellness examination    2. Irritable bowel syndrome with both constipation and diarrhea        Plan:   Anat was seen today for annual exam.    Diagnoses and all orders for this visit:    Wellness examination  -     Lipid Panel; Future  -     Comprehensive Metabolic Panel; Future  -     CBC Auto Differential; Future  -     TSH; Future    Irritable bowel syndrome with both constipation and diarrhea  -     dicyclomine (BENTYL) 20 mg tablet; TAKE 1 TABLET FOUR TIMES A DAY BEFORE MEALS AND NIGHTLY      Problem List Items Addressed This Visit     IBS (irritable bowel syndrome)    Relevant Medications    dicyclomine (BENTYL) 20 mg tablet      Other Visit Diagnoses     Wellness examination    -  Primary    Relevant Orders    Lipid Panel    Comprehensive Metabolic Panel    CBC Auto Differential    TSH

## 2020-11-27 ENCOUNTER — LAB VISIT (OUTPATIENT)
Dept: LAB | Facility: HOSPITAL | Age: 40
End: 2020-11-27
Attending: INTERNAL MEDICINE
Payer: OTHER GOVERNMENT

## 2020-11-27 DIAGNOSIS — Z00.00 WELLNESS EXAMINATION: ICD-10-CM

## 2020-11-27 LAB
ALBUMIN SERPL BCP-MCNC: 3.9 G/DL (ref 3.5–5.2)
ALP SERPL-CCNC: 36 U/L (ref 55–135)
ALT SERPL W/O P-5'-P-CCNC: 58 U/L (ref 10–44)
ANION GAP SERPL CALC-SCNC: 8 MMOL/L (ref 8–16)
AST SERPL-CCNC: 29 U/L (ref 10–40)
BASOPHILS # BLD AUTO: 0.04 K/UL (ref 0–0.2)
BASOPHILS NFR BLD: 0.9 % (ref 0–1.9)
BILIRUB SERPL-MCNC: 0.3 MG/DL (ref 0.1–1)
BUN SERPL-MCNC: 12 MG/DL (ref 6–20)
CALCIUM SERPL-MCNC: 8.4 MG/DL (ref 8.7–10.5)
CHLORIDE SERPL-SCNC: 109 MMOL/L (ref 95–110)
CHOLEST SERPL-MCNC: 131 MG/DL (ref 120–199)
CHOLEST/HDLC SERPL: 2.3 {RATIO} (ref 2–5)
CO2 SERPL-SCNC: 24 MMOL/L (ref 23–29)
CREAT SERPL-MCNC: 0.8 MG/DL (ref 0.5–1.4)
DIFFERENTIAL METHOD: ABNORMAL
EOSINOPHIL # BLD AUTO: 0.1 K/UL (ref 0–0.5)
EOSINOPHIL NFR BLD: 2.1 % (ref 0–8)
ERYTHROCYTE [DISTWIDTH] IN BLOOD BY AUTOMATED COUNT: 13.2 % (ref 11.5–14.5)
EST. GFR  (AFRICAN AMERICAN): >60 ML/MIN/1.73 M^2
EST. GFR  (NON AFRICAN AMERICAN): >60 ML/MIN/1.73 M^2
GLUCOSE SERPL-MCNC: 89 MG/DL (ref 70–110)
HCT VFR BLD AUTO: 38.9 % (ref 37–48.5)
HDLC SERPL-MCNC: 58 MG/DL (ref 40–75)
HDLC SERPL: 44.3 % (ref 20–50)
HGB BLD-MCNC: 12.3 G/DL (ref 12–16)
IMM GRANULOCYTES # BLD AUTO: 0.02 K/UL (ref 0–0.04)
IMM GRANULOCYTES NFR BLD AUTO: 0.4 % (ref 0–0.5)
LDLC SERPL CALC-MCNC: 61 MG/DL (ref 63–159)
LYMPHOCYTES # BLD AUTO: 1.8 K/UL (ref 1–4.8)
LYMPHOCYTES NFR BLD: 38.6 % (ref 18–48)
MCH RBC QN AUTO: 28.4 PG (ref 27–31)
MCHC RBC AUTO-ENTMCNC: 31.6 G/DL (ref 32–36)
MCV RBC AUTO: 90 FL (ref 82–98)
MONOCYTES # BLD AUTO: 0.4 K/UL (ref 0.3–1)
MONOCYTES NFR BLD: 8.5 % (ref 4–15)
NEUTROPHILS # BLD AUTO: 2.3 K/UL (ref 1.8–7.7)
NEUTROPHILS NFR BLD: 49.5 % (ref 38–73)
NONHDLC SERPL-MCNC: 73 MG/DL
NRBC BLD-RTO: 0 /100 WBC
PLATELET # BLD AUTO: 222 K/UL (ref 150–350)
PMV BLD AUTO: 10.3 FL (ref 9.2–12.9)
POTASSIUM SERPL-SCNC: 4.8 MMOL/L (ref 3.5–5.1)
PROT SERPL-MCNC: 6.2 G/DL (ref 6–8.4)
RBC # BLD AUTO: 4.33 M/UL (ref 4–5.4)
SODIUM SERPL-SCNC: 141 MMOL/L (ref 136–145)
TRIGL SERPL-MCNC: 60 MG/DL (ref 30–150)
TSH SERPL DL<=0.005 MIU/L-ACNC: 1.43 UIU/ML (ref 0.4–4)
WBC # BLD AUTO: 4.69 K/UL (ref 3.9–12.7)

## 2020-11-27 PROCEDURE — 80053 COMPREHEN METABOLIC PANEL: CPT

## 2020-11-27 PROCEDURE — 36415 COLL VENOUS BLD VENIPUNCTURE: CPT

## 2020-11-27 PROCEDURE — 80061 LIPID PANEL: CPT

## 2020-11-27 PROCEDURE — 84443 ASSAY THYROID STIM HORMONE: CPT

## 2020-11-27 PROCEDURE — 85025 COMPLETE CBC W/AUTO DIFF WBC: CPT

## 2020-11-30 ENCOUNTER — PATIENT MESSAGE (OUTPATIENT)
Dept: INTERNAL MEDICINE | Facility: CLINIC | Age: 40
End: 2020-11-30

## 2020-11-30 ENCOUNTER — TELEPHONE (OUTPATIENT)
Dept: INTERNAL MEDICINE | Facility: CLINIC | Age: 40
End: 2020-11-30

## 2020-11-30 DIAGNOSIS — R79.89 ABNORMAL LFTS: Primary | ICD-10-CM

## 2020-11-30 NOTE — TELEPHONE ENCOUNTER
When pain doctors write meds  We dont write pain meds. Only one doctor is supposed to write his pain meds.

## 2020-11-30 NOTE — TELEPHONE ENCOUNTER
Pt doesn't have anymore Norco for the rest of this month or December.     Please see pt message and advise.

## 2020-11-30 NOTE — TELEPHONE ENCOUNTER
NP labs show normal cholesterol;  No DM:   One of lfts is abnormal  : ALT 58   Repeat lfts in 4 weeks ; hepatitis panel  ; avoid alcohol and tylenol

## 2021-01-01 ENCOUNTER — EXTERNAL RECORD (OUTPATIENT)
Dept: OTHER | Age: 41
End: 2021-01-01

## 2021-01-28 ENCOUNTER — PATIENT MESSAGE (OUTPATIENT)
Dept: BARIATRICS | Facility: CLINIC | Age: 41
End: 2021-01-28

## 2021-01-28 ENCOUNTER — TELEPHONE (OUTPATIENT)
Dept: BARIATRICS | Facility: CLINIC | Age: 41
End: 2021-01-28

## 2021-01-29 ENCOUNTER — PATIENT OUTREACH (OUTPATIENT)
Dept: ADMINISTRATIVE | Facility: OTHER | Age: 41
End: 2021-01-29

## 2021-01-31 ENCOUNTER — PATIENT MESSAGE (OUTPATIENT)
Dept: INTERNAL MEDICINE | Facility: CLINIC | Age: 41
End: 2021-01-31

## 2021-02-01 ENCOUNTER — OFFICE VISIT (OUTPATIENT)
Dept: OBSTETRICS AND GYNECOLOGY | Facility: CLINIC | Age: 41
End: 2021-02-01
Payer: OTHER GOVERNMENT

## 2021-02-01 VITALS
RESPIRATION RATE: 12 BRPM | HEIGHT: 68 IN | DIASTOLIC BLOOD PRESSURE: 72 MMHG | SYSTOLIC BLOOD PRESSURE: 114 MMHG | WEIGHT: 170 LBS | BODY MASS INDEX: 25.76 KG/M2 | HEART RATE: 82 BPM | OXYGEN SATURATION: 100 %

## 2021-02-01 DIAGNOSIS — N90.89 VULVAR IRRITATION: ICD-10-CM

## 2021-02-01 DIAGNOSIS — N93.0 POSTCOITAL BLEEDING: Primary | ICD-10-CM

## 2021-02-01 DIAGNOSIS — Z98.890 S/P GASTRIC SURGERY: Primary | ICD-10-CM

## 2021-02-01 LAB
B-HCG UR QL: NEGATIVE
CTP QC/QA: YES

## 2021-02-01 PROCEDURE — 99214 OFFICE O/P EST MOD 30 MIN: CPT | Mod: S$PBB,,, | Performed by: STUDENT IN AN ORGANIZED HEALTH CARE EDUCATION/TRAINING PROGRAM

## 2021-02-01 PROCEDURE — 99999 PR PBB SHADOW E&M-EST. PATIENT-LVL IV: CPT | Mod: PBBFAC,,, | Performed by: STUDENT IN AN ORGANIZED HEALTH CARE EDUCATION/TRAINING PROGRAM

## 2021-02-01 PROCEDURE — 99214 PR OFFICE/OUTPT VISIT, EST, LEVL IV, 30-39 MIN: ICD-10-PCS | Mod: S$PBB,,, | Performed by: STUDENT IN AN ORGANIZED HEALTH CARE EDUCATION/TRAINING PROGRAM

## 2021-02-01 PROCEDURE — 99999 PR PBB SHADOW E&M-EST. PATIENT-LVL IV: ICD-10-PCS | Mod: PBBFAC,,, | Performed by: STUDENT IN AN ORGANIZED HEALTH CARE EDUCATION/TRAINING PROGRAM

## 2021-02-01 PROCEDURE — 99214 OFFICE O/P EST MOD 30 MIN: CPT | Mod: PBBFAC | Performed by: STUDENT IN AN ORGANIZED HEALTH CARE EDUCATION/TRAINING PROGRAM

## 2021-02-01 RX ORDER — FLUCONAZOLE 200 MG/1
TABLET ORAL
Qty: 30 TABLET | Refills: 0 | Status: SHIPPED | OUTPATIENT
Start: 2021-02-01 | End: 2021-06-03 | Stop reason: SDUPTHER

## 2021-02-09 ENCOUNTER — TELEPHONE (OUTPATIENT)
Dept: INTERNAL MEDICINE | Facility: CLINIC | Age: 41
End: 2021-02-09

## 2021-02-10 ENCOUNTER — OFFICE VISIT (OUTPATIENT)
Dept: PSYCHIATRY | Facility: CLINIC | Age: 41
End: 2021-02-10
Payer: OTHER GOVERNMENT

## 2021-02-10 ENCOUNTER — OFFICE VISIT (OUTPATIENT)
Dept: INTERNAL MEDICINE | Facility: CLINIC | Age: 41
End: 2021-02-10
Payer: OTHER GOVERNMENT

## 2021-02-10 VITALS
RESPIRATION RATE: 19 BRPM | SYSTOLIC BLOOD PRESSURE: 130 MMHG | BODY MASS INDEX: 26.65 KG/M2 | HEART RATE: 82 BPM | HEIGHT: 68 IN | WEIGHT: 175.81 LBS | DIASTOLIC BLOOD PRESSURE: 82 MMHG | TEMPERATURE: 98 F

## 2021-02-10 VITALS
HEART RATE: 71 BPM | HEIGHT: 68 IN | RESPIRATION RATE: 18 BRPM | DIASTOLIC BLOOD PRESSURE: 68 MMHG | WEIGHT: 175.94 LBS | OXYGEN SATURATION: 100 % | SYSTOLIC BLOOD PRESSURE: 118 MMHG | BODY MASS INDEX: 26.66 KG/M2 | TEMPERATURE: 97 F

## 2021-02-10 DIAGNOSIS — F51.04 PSYCHOPHYSIOLOGICAL INSOMNIA: Primary | ICD-10-CM

## 2021-02-10 DIAGNOSIS — F41.1 GAD (GENERALIZED ANXIETY DISORDER): ICD-10-CM

## 2021-02-10 DIAGNOSIS — L42 PITYRIASIS ROSEA: Primary | ICD-10-CM

## 2021-02-10 DIAGNOSIS — F33.42 MAJOR DEPRESSIVE DISORDER, RECURRENT, IN FULL REMISSION: ICD-10-CM

## 2021-02-10 DIAGNOSIS — F41.0 PANIC DISORDER: ICD-10-CM

## 2021-02-10 PROCEDURE — 99213 OFFICE O/P EST LOW 20 MIN: CPT | Mod: S$PBB,,, | Performed by: NURSE PRACTITIONER

## 2021-02-10 PROCEDURE — 99214 OFFICE O/P EST MOD 30 MIN: CPT | Mod: PBBFAC | Performed by: PSYCHIATRY & NEUROLOGY

## 2021-02-10 PROCEDURE — 99999 PR PBB SHADOW E&M-EST. PATIENT-LVL V: CPT | Mod: PBBFAC,,, | Performed by: NURSE PRACTITIONER

## 2021-02-10 PROCEDURE — 99999 PR PBB SHADOW E&M-EST. PATIENT-LVL IV: ICD-10-PCS | Mod: PBBFAC,,, | Performed by: PSYCHIATRY & NEUROLOGY

## 2021-02-10 PROCEDURE — 90833 PSYTX W PT W E/M 30 MIN: CPT | Mod: S$PBB,,, | Performed by: PSYCHIATRY & NEUROLOGY

## 2021-02-10 PROCEDURE — 99999 PR PBB SHADOW E&M-EST. PATIENT-LVL IV: CPT | Mod: PBBFAC,,, | Performed by: PSYCHIATRY & NEUROLOGY

## 2021-02-10 PROCEDURE — 99213 PR OFFICE/OUTPT VISIT, EST, LEVL III, 20-29 MIN: ICD-10-PCS | Mod: S$PBB,,, | Performed by: NURSE PRACTITIONER

## 2021-02-10 PROCEDURE — 99215 OFFICE O/P EST HI 40 MIN: CPT | Mod: PBBFAC,27 | Performed by: NURSE PRACTITIONER

## 2021-02-10 PROCEDURE — 99214 OFFICE O/P EST MOD 30 MIN: CPT | Mod: S$PBB,,, | Performed by: PSYCHIATRY & NEUROLOGY

## 2021-02-10 PROCEDURE — 90833 PR PSYCHOTHERAPY W/PATIENT W/E&M, 30 MIN (ADD ON): ICD-10-PCS | Mod: S$PBB,,, | Performed by: PSYCHIATRY & NEUROLOGY

## 2021-02-10 PROCEDURE — 99999 PR PBB SHADOW E&M-EST. PATIENT-LVL V: ICD-10-PCS | Mod: PBBFAC,,, | Performed by: NURSE PRACTITIONER

## 2021-02-10 PROCEDURE — 99214 PR OFFICE/OUTPT VISIT, EST, LEVL IV, 30-39 MIN: ICD-10-PCS | Mod: S$PBB,,, | Performed by: PSYCHIATRY & NEUROLOGY

## 2021-02-10 RX ORDER — BUPROPION HYDROCHLORIDE 300 MG/1
300 TABLET ORAL DAILY
Qty: 90 TABLET | Refills: 4 | Status: SHIPPED | OUTPATIENT
Start: 2021-02-10 | End: 2022-05-02

## 2021-02-10 RX ORDER — HYDROXYZINE PAMOATE 50 MG/1
CAPSULE ORAL
Qty: 60 CAPSULE | Refills: 0 | Status: SHIPPED | OUTPATIENT
Start: 2021-02-10

## 2021-02-10 RX ORDER — ALPRAZOLAM 0.5 MG/1
0.5 TABLET ORAL 2 TIMES DAILY PRN
Qty: 60 TABLET | Refills: 5 | Status: SHIPPED | OUTPATIENT
Start: 2021-02-10 | End: 2021-08-10

## 2021-02-10 RX ORDER — ESZOPICLONE 3 MG/1
3 TABLET, FILM COATED ORAL NIGHTLY PRN
Qty: 30 TABLET | Refills: 5 | Status: SHIPPED | OUTPATIENT
Start: 2021-02-10 | End: 2021-02-22

## 2021-02-10 RX ORDER — TRIAMCINOLONE ACETONIDE 1 MG/G
CREAM TOPICAL 2 TIMES DAILY
Qty: 160 G | Refills: 1 | Status: SHIPPED | OUTPATIENT
Start: 2021-02-10

## 2021-02-10 RX ORDER — AMITRIPTYLINE HYDROCHLORIDE 75 MG/1
75 TABLET ORAL NIGHTLY
Qty: 90 TABLET | Refills: 4 | Status: SHIPPED | OUTPATIENT
Start: 2021-02-10 | End: 2022-02-20

## 2021-02-15 ENCOUNTER — PATIENT MESSAGE (OUTPATIENT)
Dept: INTERNAL MEDICINE | Facility: CLINIC | Age: 41
End: 2021-02-15

## 2021-03-17 ENCOUNTER — EXTERNAL RECORD (OUTPATIENT)
Dept: OTHER | Age: 41
End: 2021-03-17

## 2021-04-13 ENCOUNTER — OFFICE VISIT (OUTPATIENT)
Dept: BARIATRICS | Facility: CLINIC | Age: 41
End: 2021-04-13
Payer: OTHER GOVERNMENT

## 2021-04-13 VITALS
OXYGEN SATURATION: 98 % | HEIGHT: 68 IN | DIASTOLIC BLOOD PRESSURE: 77 MMHG | WEIGHT: 174.81 LBS | RESPIRATION RATE: 16 BRPM | HEART RATE: 89 BPM | BODY MASS INDEX: 26.49 KG/M2 | SYSTOLIC BLOOD PRESSURE: 112 MMHG

## 2021-04-13 DIAGNOSIS — R63.4 WEIGHT LOSS: ICD-10-CM

## 2021-04-13 DIAGNOSIS — K76.0 FATTY LIVER: ICD-10-CM

## 2021-04-13 DIAGNOSIS — K21.9 GASTROESOPHAGEAL REFLUX DISEASE, UNSPECIFIED WHETHER ESOPHAGITIS PRESENT: ICD-10-CM

## 2021-04-13 DIAGNOSIS — F33.42 MAJOR DEPRESSIVE DISORDER, RECURRENT, IN FULL REMISSION: ICD-10-CM

## 2021-04-13 DIAGNOSIS — Z98.890 POST-OPERATIVE STATE: Primary | ICD-10-CM

## 2021-04-13 PROCEDURE — 99213 PR OFFICE/OUTPT VISIT, EST, LEVL III, 20-29 MIN: ICD-10-PCS | Mod: S$PBB,,, | Performed by: PHYSICIAN ASSISTANT

## 2021-04-13 PROCEDURE — 99213 OFFICE O/P EST LOW 20 MIN: CPT | Mod: S$PBB,,, | Performed by: PHYSICIAN ASSISTANT

## 2021-04-13 PROCEDURE — 99999 PR PBB SHADOW E&M-EST. PATIENT-LVL II: CPT | Mod: PBBFAC,,, | Performed by: PHYSICIAN ASSISTANT

## 2021-04-13 PROCEDURE — 99212 OFFICE O/P EST SF 10 MIN: CPT | Mod: PBBFAC | Performed by: PHYSICIAN ASSISTANT

## 2021-04-13 PROCEDURE — 99999 PR PBB SHADOW E&M-EST. PATIENT-LVL II: ICD-10-PCS | Mod: PBBFAC,,, | Performed by: PHYSICIAN ASSISTANT

## 2021-06-01 ENCOUNTER — PATIENT MESSAGE (OUTPATIENT)
Dept: INTERNAL MEDICINE | Facility: CLINIC | Age: 41
End: 2021-06-01

## 2021-06-03 ENCOUNTER — PATIENT MESSAGE (OUTPATIENT)
Dept: PSYCHIATRY | Facility: CLINIC | Age: 41
End: 2021-06-03

## 2021-06-07 RX ORDER — ESZOPICLONE 3 MG/1
TABLET, FILM COATED ORAL
Qty: 30 TABLET | Refills: 2 | Status: SHIPPED | OUTPATIENT
Start: 2021-06-07

## 2021-06-22 ENCOUNTER — PATIENT MESSAGE (OUTPATIENT)
Dept: BARIATRICS | Facility: CLINIC | Age: 41
End: 2021-06-22

## 2021-06-24 DIAGNOSIS — K21.9 GASTROESOPHAGEAL REFLUX DISEASE, UNSPECIFIED WHETHER ESOPHAGITIS PRESENT: ICD-10-CM

## 2021-06-24 RX ORDER — OMEPRAZOLE 40 MG/1
40 CAPSULE, DELAYED RELEASE ORAL
Qty: 180 CAPSULE | Refills: 4 | Status: SHIPPED | OUTPATIENT
Start: 2021-06-24

## 2021-09-24 DIAGNOSIS — M54.50 LOW BACK PAIN: Primary | ICD-10-CM

## 2021-10-15 ENCOUNTER — OFFICE VISIT (OUTPATIENT)
Dept: REHABILITATION | Age: 41
End: 2021-10-15

## 2021-10-15 VITALS
SYSTOLIC BLOOD PRESSURE: 124 MMHG | HEIGHT: 68 IN | BODY MASS INDEX: 25.46 KG/M2 | OXYGEN SATURATION: 100 % | WEIGHT: 168 LBS | DIASTOLIC BLOOD PRESSURE: 78 MMHG | HEART RATE: 107 BPM

## 2021-10-15 DIAGNOSIS — M54.16 LUMBAR RADICULITIS: Primary | ICD-10-CM

## 2021-10-15 DIAGNOSIS — R20.0 RIGHT SIDED NUMBNESS: ICD-10-CM

## 2021-10-15 DIAGNOSIS — M54.12 CERVICAL RADICULITIS: ICD-10-CM

## 2021-10-15 DIAGNOSIS — M47.817 LUMBOSACRAL SPONDYLOSIS WITHOUT MYELOPATHY: ICD-10-CM

## 2021-10-15 PROCEDURE — 99204 OFFICE O/P NEW MOD 45 MIN: CPT | Performed by: PHYSICAL MEDICINE & REHABILITATION

## 2021-10-15 RX ORDER — HYDROCODONE BITARTRATE AND ACETAMINOPHEN 5; 325 MG/1; MG/1
0.5 TABLET ORAL 3 TIMES DAILY PRN
COMMUNITY
Start: 2021-10-13

## 2021-10-15 RX ORDER — TOPIRAMATE 50 MG/1
50 TABLET, FILM COATED ORAL 2 TIMES DAILY
COMMUNITY
Start: 2021-05-05

## 2021-10-15 RX ORDER — ALPRAZOLAM 0.5 MG/1
0.5 TABLET ORAL EVERY 12 HOURS PRN
COMMUNITY
Start: 2021-08-10

## 2021-10-15 RX ORDER — AMITRIPTYLINE HYDROCHLORIDE 75 MG/1
75 TABLET ORAL DAILY
COMMUNITY
Start: 2021-09-27

## 2021-10-15 RX ORDER — HYDROCODONE BITARTRATE AND ACETAMINOPHEN 5; 325 MG/1; MG/1
1 TABLET ORAL 3 TIMES DAILY PRN
COMMUNITY
Start: 2021-05-05

## 2021-10-15 RX ORDER — PREGABALIN 50 MG/1
50 CAPSULE ORAL 3 TIMES DAILY
Qty: 30 CAPSULE | Refills: 0 | Status: SHIPPED | OUTPATIENT
Start: 2021-10-15 | End: 2021-10-21 | Stop reason: SDUPTHER

## 2021-10-15 RX ORDER — ESZOPICLONE 3 MG/1
3 TABLET, FILM COATED ORAL NIGHTLY PRN
COMMUNITY
Start: 2021-06-07

## 2021-10-15 ASSESSMENT — PAIN SCALES - GENERAL: PAINLEVEL: 9

## 2021-10-20 ENCOUNTER — TELEPHONE (OUTPATIENT)
Dept: REHABILITATION | Age: 41
End: 2021-10-20

## 2021-10-21 DIAGNOSIS — R20.0 RIGHT SIDED NUMBNESS: ICD-10-CM

## 2021-10-21 DIAGNOSIS — M54.12 CERVICAL RADICULITIS: ICD-10-CM

## 2021-10-21 DIAGNOSIS — M54.16 LUMBAR RADICULITIS: Primary | ICD-10-CM

## 2021-10-21 RX ORDER — PREGABALIN 50 MG/1
50 CAPSULE ORAL 3 TIMES DAILY
Qty: 90 CAPSULE | Refills: 2 | Status: SHIPPED | OUTPATIENT
Start: 2021-10-21 | End: 2021-11-09

## 2021-10-29 ENCOUNTER — APPOINTMENT (OUTPATIENT)
Dept: MRI IMAGING | Age: 41
End: 2021-10-29
Attending: PHYSICAL MEDICINE & REHABILITATION

## 2021-11-04 ENCOUNTER — HOSPITAL ENCOUNTER (OUTPATIENT)
Dept: MRI IMAGING | Age: 41
Discharge: HOME | End: 2021-11-04
Attending: PHYSICAL MEDICINE & REHABILITATION

## 2021-11-04 ENCOUNTER — OFFICE VISIT (OUTPATIENT)
Dept: NEUROLOGY | Age: 41
End: 2021-11-04
Attending: PHYSICAL MEDICINE & REHABILITATION

## 2021-11-04 ENCOUNTER — TELEPHONE (OUTPATIENT)
Dept: REHABILITATION | Age: 41
End: 2021-11-04

## 2021-11-04 DIAGNOSIS — R20.0 RIGHT SIDED NUMBNESS: ICD-10-CM

## 2021-11-04 DIAGNOSIS — M47.817 LUMBOSACRAL SPONDYLOSIS WITHOUT MYELOPATHY: ICD-10-CM

## 2021-11-04 DIAGNOSIS — M54.12 CERVICAL RADICULITIS: ICD-10-CM

## 2021-11-04 DIAGNOSIS — G56.01 CARPAL TUNNEL SYNDROME OF RIGHT WRIST: Primary | ICD-10-CM

## 2021-11-04 DIAGNOSIS — M54.16 LUMBAR RADICULITIS: ICD-10-CM

## 2021-11-04 PROCEDURE — 95886 MUSC TEST DONE W/N TEST COMP: CPT | Performed by: PSYCHIATRY & NEUROLOGY

## 2021-11-04 PROCEDURE — G1004 CDSM NDSC: HCPCS

## 2021-11-04 PROCEDURE — 70551 MRI BRAIN STEM W/O DYE: CPT

## 2021-11-04 PROCEDURE — 95913 NRV CNDJ TEST 13/> STUDIES: CPT | Performed by: PSYCHIATRY & NEUROLOGY

## 2021-11-08 ENCOUNTER — TELEPHONE (OUTPATIENT)
Dept: REHABILITATION | Age: 41
End: 2021-11-08

## 2021-11-09 ENCOUNTER — OFFICE VISIT (OUTPATIENT)
Dept: REHABILITATION | Age: 41
End: 2021-11-09

## 2021-11-09 VITALS
HEIGHT: 68 IN | WEIGHT: 170 LBS | DIASTOLIC BLOOD PRESSURE: 60 MMHG | RESPIRATION RATE: 16 BRPM | OXYGEN SATURATION: 99 % | BODY MASS INDEX: 25.76 KG/M2 | SYSTOLIC BLOOD PRESSURE: 92 MMHG | HEART RATE: 84 BPM

## 2021-11-09 DIAGNOSIS — M47.817 LUMBOSACRAL SPONDYLOSIS WITHOUT MYELOPATHY: ICD-10-CM

## 2021-11-09 DIAGNOSIS — R20.0 RIGHT SIDED NUMBNESS: Primary | ICD-10-CM

## 2021-11-09 DIAGNOSIS — M54.16 LUMBAR RADICULITIS: ICD-10-CM

## 2021-11-09 PROCEDURE — 99214 OFFICE O/P EST MOD 30 MIN: CPT | Performed by: PHYSICAL MEDICINE & REHABILITATION

## 2021-11-09 RX ORDER — FLUCONAZOLE 200 MG/1
200 TABLET ORAL
COMMUNITY
Start: 2021-10-17

## 2021-11-09 RX ORDER — OMEPRAZOLE 40 MG/1
40 CAPSULE, DELAYED RELEASE ORAL 2 TIMES DAILY
COMMUNITY
Start: 2021-06-24

## 2021-11-09 RX ORDER — LUBIPROSTONE 24 UG/1
24 CAPSULE ORAL 2 TIMES DAILY WITH MEALS
COMMUNITY
Start: 2021-09-14

## 2021-11-09 ASSESSMENT — PAIN SCALES - GENERAL: PAINLEVEL: 6

## 2021-11-16 ENCOUNTER — APPOINTMENT (OUTPATIENT)
Dept: NEUROLOGY | Age: 41
End: 2021-11-16
Attending: PHYSICAL MEDICINE & REHABILITATION

## 2021-11-30 ENCOUNTER — OFFICE VISIT (OUTPATIENT)
Dept: NEUROLOGY | Age: 41
End: 2021-11-30
Attending: PHYSICAL MEDICINE & REHABILITATION

## 2021-11-30 ENCOUNTER — LAB SERVICES (OUTPATIENT)
Dept: LAB | Age: 41
End: 2021-11-30
Attending: PSYCHIATRY & NEUROLOGY

## 2021-11-30 VITALS
OXYGEN SATURATION: 99 % | WEIGHT: 160 LBS | HEIGHT: 68 IN | BODY MASS INDEX: 24.25 KG/M2 | RESPIRATION RATE: 20 BRPM | HEART RATE: 88 BPM | SYSTOLIC BLOOD PRESSURE: 122 MMHG | DIASTOLIC BLOOD PRESSURE: 80 MMHG

## 2021-11-30 DIAGNOSIS — R20.2 PARESTHESIAS: Primary | ICD-10-CM

## 2021-11-30 DIAGNOSIS — R20.2 PARESTHESIAS: ICD-10-CM

## 2021-11-30 LAB
ALBUMIN SERPL-MCNC: 4.3 G/DL (ref 3.6–5.1)
ALBUMIN/GLOB SERPL: 1.4 {RATIO} (ref 1–2.4)
ALP SERPL-CCNC: 39 UNITS/L (ref 45–117)
ALT SERPL-CCNC: 84 UNITS/L
ANION GAP SERPL CALC-SCNC: 18 MMOL/L (ref 10–20)
AST SERPL-CCNC: 40 UNITS/L
BILIRUB SERPL-MCNC: 0.3 MG/DL (ref 0.2–1)
BUN SERPL-MCNC: 11 MG/DL (ref 6–20)
BUN/CREAT SERPL: 14 (ref 7–25)
CALCIUM SERPL-MCNC: 8.6 MG/DL (ref 8.4–10.2)
CHLORIDE SERPL-SCNC: 106 MMOL/L (ref 98–107)
CO2 SERPL-SCNC: 21 MMOL/L (ref 21–32)
CREAT SERPL-MCNC: 0.81 MG/DL (ref 0.51–0.95)
CRP SERPL-MCNC: <0.3 MG/DL
DEPRECATED RDW RBC: 45.6 FL (ref 39–50)
ERYTHROCYTE [DISTWIDTH] IN BLOOD: 14.3 % (ref 11–15)
ERYTHROCYTE [SEDIMENTATION RATE] IN BLOOD BY WESTERGREN METHOD: 2 MM/HR (ref 0–20)
FASTING DURATION TIME PATIENT: ABNORMAL H
GFR SERPLBLD BASED ON 1.73 SQ M-ARVRAT: 90 ML/MIN
GLOBULIN SER-MCNC: 3 G/DL (ref 2–4)
GLUCOSE SERPL-MCNC: 93 MG/DL (ref 70–99)
HCT VFR BLD CALC: 40.7 % (ref 36–46.5)
HGB BLD-MCNC: 13.2 G/DL (ref 12–15.5)
MCH RBC QN AUTO: 28.5 PG (ref 26–34)
MCHC RBC AUTO-ENTMCNC: 32.4 G/DL (ref 32–36.5)
MCV RBC AUTO: 87.9 FL (ref 78–100)
NRBC BLD MANUAL-RTO: 0 /100 WBC
PLATELET # BLD AUTO: 254 K/MCL (ref 140–450)
POTASSIUM SERPL-SCNC: 4 MMOL/L (ref 3.4–5.1)
PROT SERPL-MCNC: 7.3 G/DL (ref 6.4–8.2)
RBC # BLD: 4.63 MIL/MCL (ref 4–5.2)
SODIUM SERPL-SCNC: 141 MMOL/L (ref 135–145)
T4 FREE SERPL-MCNC: 0.8 NG/DL (ref 0.8–1.5)
TSH SERPL-ACNC: 1.52 MCUNITS/ML (ref 0.35–5)
WBC # BLD: 5.2 K/MCL (ref 4.2–11)

## 2021-11-30 PROCEDURE — 86235 NUCLEAR ANTIGEN ANTIBODY: CPT

## 2021-11-30 PROCEDURE — 85027 COMPLETE CBC AUTOMATED: CPT

## 2021-11-30 PROCEDURE — 36415 COLL VENOUS BLD VENIPUNCTURE: CPT

## 2021-11-30 PROCEDURE — 83090 ASSAY OF HOMOCYSTEINE: CPT

## 2021-11-30 PROCEDURE — 86225 DNA ANTIBODY NATIVE: CPT

## 2021-11-30 PROCEDURE — 82306 VITAMIN D 25 HYDROXY: CPT

## 2021-11-30 PROCEDURE — 84443 ASSAY THYROID STIM HORMONE: CPT

## 2021-11-30 PROCEDURE — 84439 ASSAY OF FREE THYROXINE: CPT

## 2021-11-30 PROCEDURE — 80053 COMPREHEN METABOLIC PANEL: CPT

## 2021-11-30 PROCEDURE — 86039 ANTINUCLEAR ANTIBODIES (ANA): CPT

## 2021-11-30 PROCEDURE — 86140 C-REACTIVE PROTEIN: CPT

## 2021-11-30 PROCEDURE — 86038 ANTINUCLEAR ANTIBODIES: CPT

## 2021-11-30 PROCEDURE — 99205 OFFICE O/P NEW HI 60 MIN: CPT | Performed by: PSYCHIATRY & NEUROLOGY

## 2021-11-30 PROCEDURE — 86431 RHEUMATOID FACTOR QUANT: CPT

## 2021-11-30 PROCEDURE — 83520 IMMUNOASSAY QUANT NOS NONAB: CPT

## 2021-11-30 PROCEDURE — 84155 ASSAY OF PROTEIN SERUM: CPT

## 2021-11-30 PROCEDURE — 82607 VITAMIN B-12: CPT

## 2021-11-30 PROCEDURE — 83036 HEMOGLOBIN GLYCOSYLATED A1C: CPT

## 2021-11-30 PROCEDURE — 85652 RBC SED RATE AUTOMATED: CPT

## 2021-11-30 PROCEDURE — 83921 ORGANIC ACID SINGLE QUANT: CPT

## 2021-11-30 RX ORDER — DICYCLOMINE HCL 20 MG
20 TABLET ORAL 2 TIMES DAILY PRN
COMMUNITY
Start: 2021-11-26

## 2021-11-30 RX ORDER — BUPROPION HYDROCHLORIDE 300 MG/1
300 TABLET ORAL DAILY
COMMUNITY
Start: 2021-11-19

## 2021-11-30 ASSESSMENT — PAIN SCALES - GENERAL: PAINLEVEL: 4

## 2021-12-01 LAB
25(OH)D3+25(OH)D2 SERPL-MCNC: 21.2 NG/ML (ref 30–100)
ALBUMIN SERPL ELPH-MCNC: 4.7 G/DL (ref 3.5–4.9)
ALPHA 1: 0.3 G/DL (ref 0.2–0.4)
ALPHA2 GLOB SERPL ELPH-MCNC: 0.6 G/DL (ref 0.5–0.9)
ANA SER QL IA: POSITIVE
B-GLOBULIN SERPL ELPH-MCNC: 0.7 G/DL (ref 0.7–1.2)
CENTROMERE AB SER-ACNC: <0.2 AI
CHROMATIN AB SERPL-ACNC: <0.2 AI
DSDNA AB SER IA-ACNC: <1 IUNITS/ML
ENA JO1 IGG SER-ACNC: <0.2 AI
ENA RNP IGG SER IA-ACNC: 0.6 AI
ENA SCL70 IGG SER IA-ACNC: 1.2 AI
ENA SM IGG SER IA-ACNC: <0.2 AI
ENA SM+RNP IGG SER IA-ACNC: <0.2 AI
ENA SS-A IGG SER IA-ACNC: <0.2 AI
ENA SS-B IGG SER IA-ACNC: <0.2 AI
GAMMA GLOB SERPL ELPH-MCNC: 0.9 G/DL (ref 0.7–1.7)
GLOBULIN SER-MCNC: 2.5 G/DL (ref 2.1–4.2)
HBA1C MFR BLD: 5.2 % (ref 4.5–5.6)
HCYS SERPL-SCNC: 7 MCMOL/L
IGA SERPL-MCNC: 182 MG/DL (ref 82–453)
IGG SERPL-MCNC: 896 MG/DL (ref 751–1560)
IGM SERPL-MCNC: 51 MG/DL (ref 46–304)
KAPPA LC FREE SER NEPH-MCNC: 1.29 MG/DL (ref 0.33–1.94)
KAPPA LC/LAMBDA SER: 0.98 {RATIO} (ref 0.26–1.65)
LAMBDA LC FREE SER NEPH-MCNC: 1.32 MG/DL (ref 0.57–2.63)
PATH INTERP BLD-IMP: NORMAL
PATH INTERP SPEC-IMP: NORMAL
PROT SERPL-MCNC: 7.2 G/DL (ref 6.4–8.2)
RHEUMATOID FACT SER NEPH-ACNC: <10 UNITS/ML
RIBOSOMAL P IGG SER-ACNC: <0.2 AI
VIT B12 SERPL-MCNC: 1366 PG/ML (ref 211–911)

## 2021-12-02 LAB
ANA PAT SER IF-IMP: NORMAL
ANA TITR SER IF: NORMAL {TITER}

## 2021-12-05 LAB — METHYLMALONATE SERPL-SCNC: 0.14 UMOL/L (ref 0–0.4)

## 2021-12-06 ENCOUNTER — TELEPHONE (OUTPATIENT)
Dept: NEUROLOGY | Age: 41
End: 2021-12-06

## 2021-12-09 ENCOUNTER — TELEPHONE (OUTPATIENT)
Dept: NEUROLOGY | Age: 41
End: 2021-12-09

## 2021-12-09 DIAGNOSIS — Z12.31 OTHER SCREENING MAMMOGRAM: ICD-10-CM

## 2021-12-17 ENCOUNTER — APPOINTMENT (OUTPATIENT)
Dept: REHABILITATION | Age: 41
End: 2021-12-17

## 2021-12-20 ENCOUNTER — TELEPHONE (OUTPATIENT)
Dept: NEUROLOGY | Age: 41
End: 2021-12-20

## 2022-01-31 ENCOUNTER — PATIENT MESSAGE (OUTPATIENT)
Dept: ADMINISTRATIVE | Facility: HOSPITAL | Age: 42
End: 2022-01-31
Payer: OTHER GOVERNMENT

## 2022-02-04 DIAGNOSIS — K58.2 IRRITABLE BOWEL SYNDROME WITH BOTH CONSTIPATION AND DIARRHEA: ICD-10-CM

## 2022-02-07 RX ORDER — DICYCLOMINE HYDROCHLORIDE 20 MG/1
TABLET ORAL
Qty: 120 TABLET | Refills: 3 | Status: SHIPPED | OUTPATIENT
Start: 2022-02-07

## 2022-02-10 ENCOUNTER — APPOINTMENT (OUTPATIENT)
Dept: NEUROLOGY | Age: 42
End: 2022-02-10

## 2022-02-28 ENCOUNTER — OFFICE VISIT (OUTPATIENT)
Dept: NEUROLOGY | Age: 42
End: 2022-02-28

## 2022-02-28 VITALS
HEART RATE: 88 BPM | HEIGHT: 68 IN | WEIGHT: 175.1 LBS | DIASTOLIC BLOOD PRESSURE: 72 MMHG | RESPIRATION RATE: 16 BRPM | BODY MASS INDEX: 26.54 KG/M2 | OXYGEN SATURATION: 99 % | SYSTOLIC BLOOD PRESSURE: 110 MMHG

## 2022-02-28 DIAGNOSIS — M79.7 FIBROMYALGIA: Primary | ICD-10-CM

## 2022-02-28 PROCEDURE — 99214 OFFICE O/P EST MOD 30 MIN: CPT | Performed by: PSYCHIATRY & NEUROLOGY

## 2022-02-28 RX ORDER — LACTULOSE 10 G/15ML
SOLUTION ORAL
COMMUNITY
Start: 2022-01-14

## 2022-02-28 RX ORDER — DULOXETIN HYDROCHLORIDE 30 MG/1
CAPSULE, DELAYED RELEASE ORAL
Qty: 180 CAPSULE | Refills: 1 | Status: SHIPPED | OUTPATIENT
Start: 2022-02-28 | End: 2022-04-19 | Stop reason: SDUPTHER

## 2022-02-28 ASSESSMENT — PAIN SCALES - GENERAL: PAINLEVEL: 6

## 2022-03-07 ENCOUNTER — PATIENT MESSAGE (OUTPATIENT)
Dept: BARIATRICS | Facility: CLINIC | Age: 42
End: 2022-03-07
Payer: OTHER GOVERNMENT

## 2022-04-07 ENCOUNTER — PATIENT MESSAGE (OUTPATIENT)
Dept: BARIATRICS | Facility: CLINIC | Age: 42
End: 2022-04-07
Payer: OTHER GOVERNMENT

## 2022-04-12 ENCOUNTER — PATIENT MESSAGE (OUTPATIENT)
Dept: BARIATRICS | Facility: CLINIC | Age: 42
End: 2022-04-12
Payer: OTHER GOVERNMENT

## 2022-04-19 RX ORDER — DULOXETIN HYDROCHLORIDE 30 MG/1
CAPSULE, DELAYED RELEASE ORAL
Qty: 180 CAPSULE | Refills: 1 | Status: SHIPPED | OUTPATIENT
Start: 2022-04-19

## 2022-05-05 ENCOUNTER — PATIENT MESSAGE (OUTPATIENT)
Dept: BARIATRICS | Facility: CLINIC | Age: 42
End: 2022-05-05
Payer: OTHER GOVERNMENT

## 2022-05-10 ENCOUNTER — PATIENT MESSAGE (OUTPATIENT)
Dept: BARIATRICS | Facility: CLINIC | Age: 42
End: 2022-05-10
Payer: OTHER GOVERNMENT

## 2022-05-16 ENCOUNTER — PATIENT MESSAGE (OUTPATIENT)
Dept: BARIATRICS | Facility: CLINIC | Age: 42
End: 2022-05-16
Payer: OTHER GOVERNMENT

## 2022-06-02 ENCOUNTER — APPOINTMENT (OUTPATIENT)
Dept: NEUROLOGY | Age: 42
End: 2022-06-02

## 2022-06-09 ENCOUNTER — TELEPHONE (OUTPATIENT)
Dept: NEUROLOGY | Age: 42
End: 2022-06-09

## 2022-06-10 ENCOUNTER — PATIENT MESSAGE (OUTPATIENT)
Dept: BARIATRICS | Facility: CLINIC | Age: 42
End: 2022-06-10
Payer: OTHER GOVERNMENT

## 2022-06-14 ENCOUNTER — PATIENT MESSAGE (OUTPATIENT)
Dept: BARIATRICS | Facility: CLINIC | Age: 42
End: 2022-06-14
Payer: OTHER GOVERNMENT

## 2022-07-05 ENCOUNTER — PATIENT MESSAGE (OUTPATIENT)
Dept: BARIATRICS | Facility: CLINIC | Age: 42
End: 2022-07-05
Payer: OTHER GOVERNMENT

## 2022-08-02 ENCOUNTER — PATIENT MESSAGE (OUTPATIENT)
Dept: BARIATRICS | Facility: CLINIC | Age: 42
End: 2022-08-02
Payer: OTHER GOVERNMENT

## 2022-08-04 ENCOUNTER — PATIENT MESSAGE (OUTPATIENT)
Dept: BARIATRICS | Facility: CLINIC | Age: 42
End: 2022-08-04
Payer: OTHER GOVERNMENT

## 2022-09-07 ENCOUNTER — PATIENT MESSAGE (OUTPATIENT)
Dept: BARIATRICS | Facility: CLINIC | Age: 42
End: 2022-09-07
Payer: OTHER GOVERNMENT

## 2022-10-06 ENCOUNTER — PATIENT MESSAGE (OUTPATIENT)
Dept: BARIATRICS | Facility: CLINIC | Age: 42
End: 2022-10-06
Payer: OTHER GOVERNMENT

## 2022-11-04 ENCOUNTER — PATIENT MESSAGE (OUTPATIENT)
Dept: BARIATRICS | Facility: CLINIC | Age: 42
End: 2022-11-04
Payer: OTHER GOVERNMENT

## 2022-12-02 DIAGNOSIS — N90.89 VULVAR IRRITATION: ICD-10-CM

## 2022-12-02 RX ORDER — FLUCONAZOLE 200 MG/1
TABLET ORAL
Qty: 30 TABLET | Refills: 3 | OUTPATIENT
Start: 2022-12-02

## 2022-12-07 ENCOUNTER — PATIENT MESSAGE (OUTPATIENT)
Dept: BARIATRICS | Facility: CLINIC | Age: 42
End: 2022-12-07
Payer: OTHER GOVERNMENT

## 2023-01-09 ENCOUNTER — PATIENT MESSAGE (OUTPATIENT)
Dept: BARIATRICS | Facility: CLINIC | Age: 43
End: 2023-01-09
Payer: OTHER GOVERNMENT

## 2023-02-09 ENCOUNTER — PATIENT MESSAGE (OUTPATIENT)
Dept: BARIATRICS | Facility: CLINIC | Age: 43
End: 2023-02-09
Payer: OTHER GOVERNMENT

## 2023-02-14 ENCOUNTER — PATIENT MESSAGE (OUTPATIENT)
Dept: BARIATRICS | Facility: CLINIC | Age: 43
End: 2023-02-14
Payer: OTHER GOVERNMENT

## 2023-02-16 ENCOUNTER — TELEPHONE (OUTPATIENT)
Dept: INTERNAL MEDICINE | Facility: CLINIC | Age: 43
End: 2023-02-16
Payer: OTHER GOVERNMENT

## 2023-02-16 NOTE — TELEPHONE ENCOUNTER
----- Message from Saadia Ghosh sent at 2023  1:45 PM CST -----  Contact: pt  Anat Parks  MRN: [id number]  : 1980  PCP: No primary care provider on file.  Home Phone      Not on file.  Work Phone      Not on file.  Mobile          [phone]      MESSAGE: pt states she had a referral to see Pain Management back in . She saw a Dr. Quintero. She states the insurance paid for October and 2020 but they are saying she didn't have a referral for 2020. She is asking if [facility] can send the referral in for 2020. Please advise and let the patient know.     [phone]

## 2023-02-16 NOTE — TELEPHONE ENCOUNTER
Pt was seen by Dr. Michael Quintero on 2020, but is just now getting a bill. The referral  on 2020 and the office saw her without current referral in place. Referral placed. Pending auth.

## 2023-02-22 ENCOUNTER — PATIENT MESSAGE (OUTPATIENT)
Dept: BARIATRICS | Facility: CLINIC | Age: 43
End: 2023-02-22
Payer: OTHER GOVERNMENT

## 2023-03-08 ENCOUNTER — PATIENT MESSAGE (OUTPATIENT)
Dept: BARIATRICS | Facility: CLINIC | Age: 43
End: 2023-03-08
Payer: OTHER GOVERNMENT

## 2023-03-14 ENCOUNTER — PATIENT MESSAGE (OUTPATIENT)
Dept: BARIATRICS | Facility: CLINIC | Age: 43
End: 2023-03-14
Payer: OTHER GOVERNMENT

## 2023-04-05 ENCOUNTER — PATIENT MESSAGE (OUTPATIENT)
Dept: BARIATRICS | Facility: CLINIC | Age: 43
End: 2023-04-05
Payer: OTHER GOVERNMENT

## 2023-04-11 ENCOUNTER — PATIENT MESSAGE (OUTPATIENT)
Dept: BARIATRICS | Facility: CLINIC | Age: 43
End: 2023-04-11
Payer: OTHER GOVERNMENT

## 2023-05-12 ENCOUNTER — TELEPHONE (OUTPATIENT)
Dept: BARIATRICS | Facility: CLINIC | Age: 43
End: 2023-05-12
Payer: OTHER GOVERNMENT

## 2023-05-12 ENCOUNTER — PATIENT MESSAGE (OUTPATIENT)
Dept: BARIATRICS | Facility: CLINIC | Age: 43
End: 2023-05-12
Payer: OTHER GOVERNMENT

## 2023-05-23 ENCOUNTER — MED INFO FORMS (OUTPATIENT)
Dept: HEALTH INFORMATION MANAGEMENT | Age: 43
End: 2023-05-23

## 2024-11-11 ENCOUNTER — OFFICE VISIT (OUTPATIENT)
Dept: FAMILY MEDICINE CLINIC | Age: 44
End: 2024-11-11
Payer: OTHER GOVERNMENT

## 2024-11-11 VITALS
WEIGHT: 170 LBS | BODY MASS INDEX: 25.76 KG/M2 | TEMPERATURE: 98.2 F | SYSTOLIC BLOOD PRESSURE: 120 MMHG | OXYGEN SATURATION: 99 % | HEIGHT: 68 IN | HEART RATE: 86 BPM | DIASTOLIC BLOOD PRESSURE: 74 MMHG

## 2024-11-11 DIAGNOSIS — F33.41 RECURRENT MAJOR DEPRESSIVE DISORDER, IN PARTIAL REMISSION (HCC): ICD-10-CM

## 2024-11-11 DIAGNOSIS — M54.42 CHRONIC LOW BACK PAIN WITH LEFT-SIDED SCIATICA, UNSPECIFIED BACK PAIN LATERALITY: ICD-10-CM

## 2024-11-11 DIAGNOSIS — G89.29 CHRONIC LOW BACK PAIN WITH LEFT-SIDED SCIATICA, UNSPECIFIED BACK PAIN LATERALITY: ICD-10-CM

## 2024-11-11 DIAGNOSIS — K21.9 GASTROESOPHAGEAL REFLUX DISEASE, UNSPECIFIED WHETHER ESOPHAGITIS PRESENT: ICD-10-CM

## 2024-11-11 DIAGNOSIS — Z13.220 SCREENING CHOLESTEROL LEVEL: ICD-10-CM

## 2024-11-11 DIAGNOSIS — Z98.84 HISTORY OF GASTRIC BYPASS: ICD-10-CM

## 2024-11-11 DIAGNOSIS — G47.00 INSOMNIA, UNSPECIFIED TYPE: ICD-10-CM

## 2024-11-11 DIAGNOSIS — M79.7 FIBROMYALGIA: ICD-10-CM

## 2024-11-11 DIAGNOSIS — F41.9 ANXIETY: Primary | ICD-10-CM

## 2024-11-11 PROCEDURE — 99204 OFFICE O/P NEW MOD 45 MIN: CPT | Performed by: FAMILY MEDICINE

## 2024-11-11 RX ORDER — VITAMIN B COMPLEX
50 CAPSULE ORAL
COMMUNITY

## 2024-11-11 RX ORDER — NORTRIPTYLINE HYDROCHLORIDE 25 MG/1
25 CAPSULE ORAL NIGHTLY
COMMUNITY

## 2024-11-11 RX ORDER — LYSINE HCL 500 MG
TABLET ORAL
COMMUNITY

## 2024-11-11 RX ORDER — PHENTERMINE HYDROCHLORIDE 15 MG/1
1 CAPSULE ORAL DAILY
COMMUNITY
End: 2024-11-11

## 2024-11-11 RX ORDER — BUPROPION HYDROCHLORIDE 300 MG/1
1 TABLET ORAL DAILY
COMMUNITY

## 2024-11-11 RX ORDER — ESTRADIOL 1 MG/1
1 TABLET ORAL DAILY
COMMUNITY

## 2024-11-11 RX ORDER — ESZOPICLONE 3 MG/1
1 TABLET, FILM COATED ORAL DAILY
COMMUNITY

## 2024-11-11 RX ORDER — LEVOTHYROXINE SODIUM 25 UG/1
25 TABLET ORAL
COMMUNITY
End: 2024-11-11

## 2024-11-11 RX ORDER — LUBIPROSTONE 24 UG/1
1 CAPSULE ORAL 2 TIMES DAILY
COMMUNITY

## 2024-11-11 RX ORDER — FLUCONAZOLE 150 MG/1
150 TABLET ORAL DAILY
COMMUNITY

## 2024-11-11 RX ORDER — OMEPRAZOLE 40 MG/1
40 CAPSULE, DELAYED RELEASE ORAL 2 TIMES DAILY
COMMUNITY

## 2024-11-11 RX ORDER — ALPRAZOLAM 0.5 MG
0.5 TABLET ORAL 3 TIMES DAILY PRN
COMMUNITY

## 2024-11-11 RX ORDER — HYDROCODONE BITARTRATE AND ACETAMINOPHEN 7.5; 325 MG/1; MG/1
1 TABLET ORAL EVERY 8 HOURS PRN
COMMUNITY

## 2024-11-11 RX ORDER — TOPIRAMATE 100 MG/1
1 TABLET, FILM COATED ORAL 2 TIMES DAILY
COMMUNITY

## 2024-11-11 SDOH — ECONOMIC STABILITY: FOOD INSECURITY: WITHIN THE PAST 12 MONTHS, YOU WORRIED THAT YOUR FOOD WOULD RUN OUT BEFORE YOU GOT MONEY TO BUY MORE.: NEVER TRUE

## 2024-11-11 SDOH — ECONOMIC STABILITY: FOOD INSECURITY: WITHIN THE PAST 12 MONTHS, THE FOOD YOU BOUGHT JUST DIDN'T LAST AND YOU DIDN'T HAVE MONEY TO GET MORE.: NEVER TRUE

## 2024-11-11 SDOH — ECONOMIC STABILITY: INCOME INSECURITY: HOW HARD IS IT FOR YOU TO PAY FOR THE VERY BASICS LIKE FOOD, HOUSING, MEDICAL CARE, AND HEATING?: NOT HARD AT ALL

## 2024-11-11 ASSESSMENT — PATIENT HEALTH QUESTIONNAIRE - PHQ9
SUM OF ALL RESPONSES TO PHQ QUESTIONS 1-9: 0
2. FEELING DOWN, DEPRESSED OR HOPELESS: NOT AT ALL
SUM OF ALL RESPONSES TO PHQ QUESTIONS 1-9: 0
SUM OF ALL RESPONSES TO PHQ QUESTIONS 1-9: 0
1. LITTLE INTEREST OR PLEASURE IN DOING THINGS: NOT AT ALL
SUM OF ALL RESPONSES TO PHQ9 QUESTIONS 1 & 2: 0
SUM OF ALL RESPONSES TO PHQ QUESTIONS 1-9: 0

## 2024-11-11 NOTE — PROGRESS NOTES
.   SAVANNAH HARTLEY [address] [place] [address]  Dept: [phone]  Dept Fax: [phone]    Visit type: New patient    Reason for Visit: Establish Care      Assessment & Plan   Assessment and Plan       Assessment & Plan  Anxiety       Orders:    CBC with Auto Differential; Future    Comprehensive Metabolic Panel; Future    TSH; Future    History of gastric bypass       Orders:    Vitamin D 25 Hydroxy; Future    Folate; Future    Vitamin B12; Future    Screening cholesterol level       Orders:    Lipid Panel; Future    Chronic low back pain with left-sided sciatica, unspecified back pain laterality       Orders:    External Referral To Pain Clinic    Fibromyalgia       Orders:    External Referral To Pain Clinic    Recurrent major depressive disorder, in partial remission (HCC)            Gastroesophageal reflux disease, unspecified whether esophagitis present            Insomnia, unspecified type            Patient appears to be stable and doing well with her current medications.  Stressed the importance of as needed use of her medications only and based on her medication availability recommended follow-up prior to needing further refills of the Xanax, Lunesta, or Norco.  Discussed the possibility of involving pain management in her care for long-term management of her chronic pain issues.  Discussed labs for further evaluation continue monitoring.  Discussed signs and symptoms requiring medical attention.  All questions were answered and patient voiced understanding and agreement plan as discussed.    Return if symptoms worsen or fail to improve, for next scheduled follow up with PCP.       Subjective       HPI   Patient presents to establish [facility] and states he has a history of bipolar disorder with panic attacks and has been taking Xanax as needed but only takes it about 3 times a week.  She is on Wellbutrin and with these medications feels that she is doing well with her mood without any new

## 2024-11-23 PROBLEM — M54.42 CHRONIC LOW BACK PAIN WITH LEFT-SIDED SCIATICA: Status: ACTIVE | Noted: 2024-11-23

## 2024-11-23 PROBLEM — Z98.84 HISTORY OF GASTRIC BYPASS: Status: ACTIVE | Noted: 2024-11-23

## 2024-11-23 PROBLEM — F41.9 ANXIETY: Status: ACTIVE | Noted: 2024-11-23

## 2024-11-23 PROBLEM — M79.7 FIBROMYALGIA: Status: ACTIVE | Noted: 2024-11-23

## 2024-11-23 PROBLEM — F33.41 RECURRENT MAJOR DEPRESSIVE DISORDER, IN PARTIAL REMISSION (HCC): Status: ACTIVE | Noted: 2024-11-23

## 2024-11-23 PROBLEM — G47.00 INSOMNIA: Status: ACTIVE | Noted: 2024-11-23

## 2024-11-23 PROBLEM — G89.29 CHRONIC LOW BACK PAIN WITH LEFT-SIDED SCIATICA: Status: ACTIVE | Noted: 2024-11-23

## 2024-11-23 PROBLEM — K21.9 GASTROESOPHAGEAL REFLUX DISEASE: Status: ACTIVE | Noted: 2024-11-23

## 2024-11-23 ASSESSMENT — ENCOUNTER SYMPTOMS
CONSTIPATION: 0
COUGH: 0
ABDOMINAL PAIN: 0
RHINORRHEA: 0
VOMITING: 0
DIARRHEA: 0
NAUSEA: 0
SHORTNESS OF BREATH: 0
BACK PAIN: 1

## 2024-11-23 NOTE — ASSESSMENT & PLAN NOTE
Orders:    CBC with Auto Differential; Future    Comprehensive Metabolic Panel; Future    TSH; Future

## 2024-12-02 DIAGNOSIS — Z13.220 SCREENING CHOLESTEROL LEVEL: ICD-10-CM

## 2024-12-02 DIAGNOSIS — Z98.84 HISTORY OF GASTRIC BYPASS: ICD-10-CM

## 2024-12-02 DIAGNOSIS — F41.9 ANXIETY: ICD-10-CM

## 2024-12-02 LAB
25(OH)D3 SERPL-MCNC: 36.1 NG/ML
ALBUMIN SERPL-MCNC: 4.5 G/DL (ref 3.5–5.2)
ALP SERPL-CCNC: 47 U/L (ref 35–104)
ALT SERPL-CCNC: 43 U/L (ref 5–33)
ANION GAP SERPL CALCULATED.3IONS-SCNC: 11 MMOL/L (ref 7–19)
AST SERPL-CCNC: 23 U/L (ref 5–32)
BASOPHILS # BLD: 0 K/UL (ref 0–0.2)
BASOPHILS NFR BLD: 0.7 % (ref 0–1)
BILIRUB SERPL-MCNC: 0.3 MG/DL (ref 0.2–1.2)
BUN SERPL-MCNC: 13 MG/DL (ref 6–20)
CALCIUM SERPL-MCNC: 9 MG/DL (ref 8.6–10)
CHLORIDE SERPL-SCNC: 106 MMOL/L (ref 98–111)
CHOLEST SERPL-MCNC: 179 MG/DL (ref 0–199)
CO2 SERPL-SCNC: 24 MMOL/L (ref 22–29)
CREAT SERPL-MCNC: 0.7 MG/DL (ref 0.5–0.9)
EOSINOPHIL # BLD: 0 K/UL (ref 0–0.6)
EOSINOPHIL NFR BLD: 0.7 % (ref 0–5)
ERYTHROCYTE [DISTWIDTH] IN BLOOD BY AUTOMATED COUNT: 13.8 % (ref 11.5–14.5)
FOLATE SERPL-MCNC: >20 NG/ML (ref 4.8–37.3)
GLUCOSE SERPL-MCNC: 87 MG/DL (ref 70–99)
HCT VFR BLD AUTO: 42.3 % (ref 37–47)
HDLC SERPL-MCNC: 85 MG/DL (ref 40–60)
HGB BLD-MCNC: 13.4 G/DL (ref 12–16)
IMM GRANULOCYTES # BLD: 0 K/UL
LDLC SERPL CALC-MCNC: 72 MG/DL
LYMPHOCYTES # BLD: 1.9 K/UL (ref 1.1–4.5)
LYMPHOCYTES NFR BLD: 32.8 % (ref 20–40)
MCH RBC QN AUTO: 28.3 PG (ref 27–31)
MCHC RBC AUTO-ENTMCNC: 31.7 G/DL (ref 33–37)
MCV RBC AUTO: 89.4 FL (ref 81–99)
MONOCYTES # BLD: 0.4 K/UL (ref 0–0.9)
MONOCYTES NFR BLD: 7.3 % (ref 0–10)
NEUTROPHILS # BLD: 3.3 K/UL (ref 1.5–7.5)
NEUTS SEG NFR BLD: 57.8 % (ref 50–65)
PLATELET # BLD AUTO: 260 K/UL (ref 130–400)
PMV BLD AUTO: 10.5 FL (ref 9.4–12.3)
POTASSIUM SERPL-SCNC: 3.7 MMOL/L (ref 3.5–5)
PROT SERPL-MCNC: 7.1 G/DL (ref 6.4–8.3)
RBC # BLD AUTO: 4.73 M/UL (ref 4.2–5.4)
SODIUM SERPL-SCNC: 141 MMOL/L (ref 136–145)
TRIGL SERPL-MCNC: 108 MG/DL (ref 0–149)
TSH SERPL DL<=0.005 MIU/L-ACNC: 2.4 UIU/ML (ref 0.27–4.2)
VIT B12 SERPL-MCNC: 799 PG/ML (ref 232–1245)
WBC # BLD AUTO: 5.8 K/UL (ref 4.8–10.8)

## 2024-12-23 ENCOUNTER — OFFICE VISIT (OUTPATIENT)
Dept: FAMILY MEDICINE CLINIC | Age: 44
End: 2024-12-23
Payer: OTHER GOVERNMENT

## 2024-12-23 VITALS
TEMPERATURE: 98 F | HEART RATE: 106 BPM | BODY MASS INDEX: 26.69 KG/M2 | HEIGHT: 68 IN | DIASTOLIC BLOOD PRESSURE: 78 MMHG | WEIGHT: 176.13 LBS | SYSTOLIC BLOOD PRESSURE: 130 MMHG | OXYGEN SATURATION: 98 %

## 2024-12-23 DIAGNOSIS — M54.50 CHRONIC LOW BACK PAIN, UNSPECIFIED BACK PAIN LATERALITY, UNSPECIFIED WHETHER SCIATICA PRESENT: Primary | ICD-10-CM

## 2024-12-23 DIAGNOSIS — F41.9 ANXIETY: ICD-10-CM

## 2024-12-23 DIAGNOSIS — G89.29 CHRONIC LOW BACK PAIN, UNSPECIFIED BACK PAIN LATERALITY, UNSPECIFIED WHETHER SCIATICA PRESENT: Primary | ICD-10-CM

## 2024-12-23 DIAGNOSIS — G47.00 INSOMNIA, UNSPECIFIED TYPE: ICD-10-CM

## 2024-12-23 PROCEDURE — 99214 OFFICE O/P EST MOD 30 MIN: CPT | Performed by: FAMILY MEDICINE

## 2024-12-23 RX ORDER — ALPRAZOLAM 0.5 MG
0.5 TABLET ORAL 2 TIMES DAILY PRN
Qty: 60 TABLET | Refills: 0 | Status: SHIPPED | OUTPATIENT
Start: 2024-12-23 | End: 2025-12-23

## 2024-12-23 RX ORDER — HYDROCODONE BITARTRATE AND ACETAMINOPHEN 7.5; 325 MG/1; MG/1
1 TABLET ORAL EVERY 8 HOURS PRN
Qty: 90 TABLET | Refills: 0 | Status: SHIPPED | OUTPATIENT
Start: 2024-12-23 | End: 2025-12-23

## 2024-12-23 SDOH — ECONOMIC STABILITY: FOOD INSECURITY: WITHIN THE PAST 12 MONTHS, YOU WORRIED THAT YOUR FOOD WOULD RUN OUT BEFORE YOU GOT MONEY TO BUY MORE.: NEVER TRUE

## 2024-12-23 SDOH — ECONOMIC STABILITY: FOOD INSECURITY: WITHIN THE PAST 12 MONTHS, THE FOOD YOU BOUGHT JUST DIDN'T LAST AND YOU DIDN'T HAVE MONEY TO GET MORE.: NEVER TRUE

## 2024-12-23 SDOH — ECONOMIC STABILITY: INCOME INSECURITY: HOW HARD IS IT FOR YOU TO PAY FOR THE VERY BASICS LIKE FOOD, HOUSING, MEDICAL CARE, AND HEATING?: NOT HARD AT ALL

## 2024-12-23 ASSESSMENT — ENCOUNTER SYMPTOMS
VOMITING: 0
DIARRHEA: 0
SHORTNESS OF BREATH: 0
ABDOMINAL PAIN: 0
CONSTIPATION: 0
BACK PAIN: 1
COUGH: 0
RHINORRHEA: 0
NAUSEA: 0

## 2024-12-23 ASSESSMENT — PATIENT HEALTH QUESTIONNAIRE - PHQ9
4. FEELING TIRED OR HAVING LITTLE ENERGY: SEVERAL DAYS
3. TROUBLE FALLING OR STAYING ASLEEP: SEVERAL DAYS
8. MOVING OR SPEAKING SO SLOWLY THAT OTHER PEOPLE COULD HAVE NOTICED. OR THE OPPOSITE, BEING SO FIGETY OR RESTLESS THAT YOU HAVE BEEN MOVING AROUND A LOT MORE THAN USUAL: MORE THAN HALF THE DAYS
10. IF YOU CHECKED OFF ANY PROBLEMS, HOW DIFFICULT HAVE THESE PROBLEMS MADE IT FOR YOU TO DO YOUR WORK, TAKE CARE OF THINGS AT HOME, OR GET ALONG WITH OTHER PEOPLE: NOT DIFFICULT AT ALL
1. LITTLE INTEREST OR PLEASURE IN DOING THINGS: NOT AT ALL
5. POOR APPETITE OR OVEREATING: NOT AT ALL
9. THOUGHTS THAT YOU WOULD BE BETTER OFF DEAD, OR OF HURTING YOURSELF: NOT AT ALL
SUM OF ALL RESPONSES TO PHQ QUESTIONS 1-9: 6
2. FEELING DOWN, DEPRESSED OR HOPELESS: SEVERAL DAYS
SUM OF ALL RESPONSES TO PHQ9 QUESTIONS 1 & 2: 1
SUM OF ALL RESPONSES TO PHQ QUESTIONS 1-9: 6
6. FEELING BAD ABOUT YOURSELF - OR THAT YOU ARE A FAILURE OR HAVE LET YOURSELF OR YOUR FAMILY DOWN: NOT AT ALL
7. TROUBLE CONCENTRATING ON THINGS, SUCH AS READING THE NEWSPAPER OR WATCHING TELEVISION: SEVERAL DAYS

## 2024-12-23 NOTE — ASSESSMENT & PLAN NOTE
Orders:    ALPRAZolam (XANAX) 0.5 MG tablet; Take 1 tablet by mouth 2 times daily as needed for Anxiety. Max Daily Amount: 1 mg

## 2024-12-23 NOTE — PROGRESS NOTES
(WELLBUTRIN XL) 300 MG extended release tablet Take 1 tablet by mouth daily      estradiol (ESTRACE) 1 MG tablet Take 1 tablet by mouth daily      lubiprostone (AMITIZA) 24 MCG capsule Take 1 capsule by mouth 2 times daily      omeprazole (PRILOSEC) 40 MG delayed release capsule Take 1 capsule by mouth 2 times daily      topiramate (TOPAMAX) 100 MG tablet Take 1 tablet by mouth 2 times daily      nortriptyline (PAMELOR) 25 MG capsule Take 1 capsule by mouth nightly      fluconazole (DIFLUCAN) 150 MG tablet Take 1 tablet by mouth daily      Magnesium Citrate (CITROMA PO) take 100mgs BID      HYDROcodone-acetaminophen (NORCO) 7.5-325 MG per tablet Take 1 tablet by mouth every 8 hours as needed for Pain.      ALPRAZolam (XANAX) 0.5 MG tablet Take 1 tablet by mouth 3 times daily as needed.       No facility-administered medications prior to visit.        History reviewed. No pertinent past medical history.     Social History     Tobacco Use    Smoking status: Former     Current packs/day: 0.00     Average packs/day: 1 pack/day for 20.0 years (20.0 ttl pk-yrs)     Types: Cigarettes     Start date:      Quit date:      Years since quittin.9    Smokeless tobacco: Never   Substance Use Topics    Alcohol use: Not on file        History reviewed. No pertinent surgical history.    History reviewed. No pertinent family history.    Objective       /78 (Site: Right Upper Arm, Position: Sitting, Cuff Size: Large Adult)   Pulse (!) 106   Temp 98 °F (36.7 °C) (Temporal)   Ht 1.727 m (5' 8\")   Wt 79.9 kg (176 lb 2 oz)   SpO2 98%   BMI 26.78 kg/m²   Physical Exam  Vitals and nursing note reviewed.   Constitutional:       General: She is not in acute distress.     Appearance: Normal appearance. She is well-developed. She is not diaphoretic.   HENT:      Head: Normocephalic and atraumatic.      Right Ear: External ear normal.      Left Ear: External ear normal.      Nose: Nose normal.      Mouth/Throat:

## 2025-01-08 ENCOUNTER — HOSPITAL ENCOUNTER (OUTPATIENT)
Dept: GENERAL RADIOLOGY | Age: 45
Discharge: HOME | End: 2025-01-08
Payer: OTHER GOVERNMENT

## 2025-01-08 DIAGNOSIS — M47.816 LUMBAR SPONDYLOSIS: ICD-10-CM

## 2025-01-08 DIAGNOSIS — M54.16 LUMBAR RADICULOPATHY: ICD-10-CM

## 2025-01-08 DIAGNOSIS — M79.7 FIBROSITIS: ICD-10-CM

## 2025-01-08 PROCEDURE — 72110 X-RAY EXAM L-2 SPINE 4/>VWS: CPT

## 2025-01-13 DIAGNOSIS — M25.512 LEFT SHOULDER PAIN, UNSPECIFIED CHRONICITY: ICD-10-CM

## 2025-01-13 DIAGNOSIS — M54.2 NECK PAIN: Primary | ICD-10-CM

## 2025-01-16 ENCOUNTER — HOSPITAL ENCOUNTER (OUTPATIENT)
Dept: GENERAL RADIOLOGY | Age: 45
Discharge: HOME | End: 2025-01-16
Payer: OTHER GOVERNMENT

## 2025-01-16 DIAGNOSIS — M54.2 NECK PAIN: ICD-10-CM

## 2025-01-16 DIAGNOSIS — M25.512 LEFT SHOULDER PAIN, UNSPECIFIED CHRONICITY: ICD-10-CM

## 2025-01-16 PROCEDURE — 72040 X-RAY EXAM NECK SPINE 2-3 VW: CPT

## 2025-01-16 PROCEDURE — 73030 X-RAY EXAM OF SHOULDER: CPT

## 2025-01-18 SDOH — ECONOMIC STABILITY: INCOME INSECURITY: IN THE LAST 12 MONTHS, WAS THERE A TIME WHEN YOU WERE NOT ABLE TO PAY THE MORTGAGE OR RENT ON TIME?: NO

## 2025-01-18 SDOH — ECONOMIC STABILITY: FOOD INSECURITY: WITHIN THE PAST 12 MONTHS, THE FOOD YOU BOUGHT JUST DIDN'T LAST AND YOU DIDN'T HAVE MONEY TO GET MORE.: NEVER TRUE

## 2025-01-18 SDOH — ECONOMIC STABILITY: FOOD INSECURITY: WITHIN THE PAST 12 MONTHS, YOU WORRIED THAT YOUR FOOD WOULD RUN OUT BEFORE YOU GOT MONEY TO BUY MORE.: NEVER TRUE

## 2025-01-18 SDOH — ECONOMIC STABILITY: TRANSPORTATION INSECURITY
IN THE PAST 12 MONTHS, HAS THE LACK OF TRANSPORTATION KEPT YOU FROM MEDICAL APPOINTMENTS OR FROM GETTING MEDICATIONS?: NO

## 2025-01-18 SDOH — ECONOMIC STABILITY: TRANSPORTATION INSECURITY
IN THE PAST 12 MONTHS, HAS LACK OF TRANSPORTATION KEPT YOU FROM MEETINGS, WORK, OR FROM GETTING THINGS NEEDED FOR DAILY LIVING?: NO

## 2025-01-18 ASSESSMENT — PATIENT HEALTH QUESTIONNAIRE - PHQ9
4. FEELING TIRED OR HAVING LITTLE ENERGY: NEARLY EVERY DAY
SUM OF ALL RESPONSES TO PHQ QUESTIONS 1-9: 13
6. FEELING BAD ABOUT YOURSELF - OR THAT YOU ARE A FAILURE OR HAVE LET YOURSELF OR YOUR FAMILY DOWN: MORE THAN HALF THE DAYS
6. FEELING BAD ABOUT YOURSELF - OR THAT YOU ARE A FAILURE OR HAVE LET YOURSELF OR YOUR FAMILY DOWN: MORE THAN HALF THE DAYS
9. THOUGHTS THAT YOU WOULD BE BETTER OFF DEAD, OR OF HURTING YOURSELF: NOT AT ALL
2. FEELING DOWN, DEPRESSED OR HOPELESS: SEVERAL DAYS
SUM OF ALL RESPONSES TO PHQ QUESTIONS 1-9: 13
5. POOR APPETITE OR OVEREATING: SEVERAL DAYS
2. FEELING DOWN, DEPRESSED OR HOPELESS: SEVERAL DAYS
7. TROUBLE CONCENTRATING ON THINGS, SUCH AS READING THE NEWSPAPER OR WATCHING TELEVISION: NEARLY EVERY DAY
3. TROUBLE FALLING OR STAYING ASLEEP: MORE THAN HALF THE DAYS
SUM OF ALL RESPONSES TO PHQ9 QUESTIONS 1 & 2: 2
SUM OF ALL RESPONSES TO PHQ QUESTIONS 1-9: 13
1. LITTLE INTEREST OR PLEASURE IN DOING THINGS: SEVERAL DAYS
7. TROUBLE CONCENTRATING ON THINGS, SUCH AS READING THE NEWSPAPER OR WATCHING TELEVISION: NEARLY EVERY DAY
9. THOUGHTS THAT YOU WOULD BE BETTER OFF DEAD, OR OF HURTING YOURSELF: NOT AT ALL
10. IF YOU CHECKED OFF ANY PROBLEMS, HOW DIFFICULT HAVE THESE PROBLEMS MADE IT FOR YOU TO DO YOUR WORK, TAKE CARE OF THINGS AT HOME, OR GET ALONG WITH OTHER PEOPLE: SOMEWHAT DIFFICULT
3. TROUBLE FALLING OR STAYING ASLEEP: MORE THAN HALF THE DAYS
10. IF YOU CHECKED OFF ANY PROBLEMS, HOW DIFFICULT HAVE THESE PROBLEMS MADE IT FOR YOU TO DO YOUR WORK, TAKE CARE OF THINGS AT HOME, OR GET ALONG WITH OTHER PEOPLE: SOMEWHAT DIFFICULT
8. MOVING OR SPEAKING SO SLOWLY THAT OTHER PEOPLE COULD HAVE NOTICED. OR THE OPPOSITE, BEING SO FIGETY OR RESTLESS THAT YOU HAVE BEEN MOVING AROUND A LOT MORE THAN USUAL: NOT AT ALL
4. FEELING TIRED OR HAVING LITTLE ENERGY: NEARLY EVERY DAY
SUM OF ALL RESPONSES TO PHQ QUESTIONS 1-9: 13
1. LITTLE INTEREST OR PLEASURE IN DOING THINGS: SEVERAL DAYS
SUM OF ALL RESPONSES TO PHQ QUESTIONS 1-9: 13
5. POOR APPETITE OR OVEREATING: SEVERAL DAYS
8. MOVING OR SPEAKING SO SLOWLY THAT OTHER PEOPLE COULD HAVE NOTICED. OR THE OPPOSITE - BEING SO FIDGETY OR RESTLESS THAT YOU HAVE BEEN MOVING AROUND A LOT MORE THAN USUAL: NOT AT ALL

## 2025-01-20 ENCOUNTER — OFFICE VISIT (OUTPATIENT)
Age: 45
End: 2025-01-20
Payer: OTHER GOVERNMENT

## 2025-01-20 VITALS
SYSTOLIC BLOOD PRESSURE: 118 MMHG | TEMPERATURE: 98 F | DIASTOLIC BLOOD PRESSURE: 64 MMHG | OXYGEN SATURATION: 98 % | HEIGHT: 68 IN | HEART RATE: 98 BPM | BODY MASS INDEX: 26.37 KG/M2 | WEIGHT: 174 LBS

## 2025-01-20 DIAGNOSIS — G89.29 CHRONIC LOW BACK PAIN, UNSPECIFIED BACK PAIN LATERALITY, UNSPECIFIED WHETHER SCIATICA PRESENT: Primary | ICD-10-CM

## 2025-01-20 DIAGNOSIS — K58.1 IRRITABLE BOWEL SYNDROME WITH CONSTIPATION: ICD-10-CM

## 2025-01-20 DIAGNOSIS — F41.9 ANXIETY: ICD-10-CM

## 2025-01-20 DIAGNOSIS — M54.50 CHRONIC LOW BACK PAIN, UNSPECIFIED BACK PAIN LATERALITY, UNSPECIFIED WHETHER SCIATICA PRESENT: Primary | ICD-10-CM

## 2025-01-20 DIAGNOSIS — M79.7 FIBROMYALGIA: ICD-10-CM

## 2025-01-20 DIAGNOSIS — K21.9 GASTROESOPHAGEAL REFLUX DISEASE, UNSPECIFIED WHETHER ESOPHAGITIS PRESENT: ICD-10-CM

## 2025-01-20 DIAGNOSIS — B37.9 YEAST INFECTION: ICD-10-CM

## 2025-01-20 PROCEDURE — 99214 OFFICE O/P EST MOD 30 MIN: CPT | Performed by: FAMILY MEDICINE

## 2025-01-20 RX ORDER — TOPIRAMATE 100 MG/1
100 TABLET, FILM COATED ORAL 2 TIMES DAILY
Qty: 180 TABLET | Refills: 1 | Status: SHIPPED | OUTPATIENT
Start: 2025-01-20

## 2025-01-20 RX ORDER — OMEPRAZOLE 40 MG/1
40 CAPSULE, DELAYED RELEASE ORAL 2 TIMES DAILY
Qty: 90 CAPSULE | Refills: 1 | Status: SHIPPED | OUTPATIENT
Start: 2025-01-20

## 2025-01-20 RX ORDER — LUBIPROSTONE 24 UG/1
24 CAPSULE ORAL 2 TIMES DAILY
Qty: 180 CAPSULE | Refills: 1 | Status: SHIPPED | OUTPATIENT
Start: 2025-01-20

## 2025-01-20 RX ORDER — ESTRADIOL 1 MG/1
1 TABLET ORAL DAILY
Qty: 90 TABLET | Refills: 1 | Status: SHIPPED | OUTPATIENT
Start: 2025-01-20

## 2025-01-20 RX ORDER — FLUCONAZOLE 150 MG/1
150 TABLET ORAL DAILY
Qty: 30 TABLET | Refills: 0 | Status: SHIPPED | OUTPATIENT
Start: 2025-01-20

## 2025-01-20 RX ORDER — ALPRAZOLAM 0.5 MG
0.5 TABLET ORAL 2 TIMES DAILY PRN
Qty: 60 TABLET | Refills: 2 | Status: SHIPPED | OUTPATIENT
Start: 2025-01-20 | End: 2026-01-20

## 2025-01-20 ASSESSMENT — ENCOUNTER SYMPTOMS
NAUSEA: 0
DIARRHEA: 0
ABDOMINAL PAIN: 0
BACK PAIN: 1
RHINORRHEA: 0
VOMITING: 0
COUGH: 0
SHORTNESS OF BREATH: 0
CONSTIPATION: 0

## 2025-01-20 NOTE — ASSESSMENT & PLAN NOTE
Orders:    omeprazole (PRILOSEC) 40 MG delayed release capsule; Take 1 capsule by mouth 2 times daily

## 2025-01-21 RX ORDER — NORTRIPTYLINE HYDROCHLORIDE 25 MG/1
25 CAPSULE ORAL NIGHTLY
Qty: 90 CAPSULE | Refills: 1 | Status: SHIPPED | OUTPATIENT
Start: 2025-01-21

## 2025-01-21 RX ORDER — BUPROPION HYDROCHLORIDE 300 MG/1
300 TABLET ORAL EVERY MORNING
Qty: 90 TABLET | Refills: 0 | Status: SHIPPED | OUTPATIENT
Start: 2025-01-21

## 2025-03-31 RX ORDER — BUPROPION HYDROCHLORIDE 300 MG/1
300 TABLET ORAL EVERY MORNING
Qty: 90 TABLET | Refills: 1 | Status: SHIPPED | OUTPATIENT
Start: 2025-03-31

## 2025-04-08 ENCOUNTER — OFFICE VISIT (OUTPATIENT)
Age: 45
End: 2025-04-08
Payer: OTHER GOVERNMENT

## 2025-04-08 VITALS
HEART RATE: 110 BPM | WEIGHT: 181.5 LBS | OXYGEN SATURATION: 97 % | TEMPERATURE: 97.6 F | BODY MASS INDEX: 27.51 KG/M2 | SYSTOLIC BLOOD PRESSURE: 140 MMHG | DIASTOLIC BLOOD PRESSURE: 86 MMHG | HEIGHT: 68 IN

## 2025-04-08 DIAGNOSIS — G47.00 INSOMNIA, UNSPECIFIED TYPE: Primary | ICD-10-CM

## 2025-04-08 DIAGNOSIS — F41.9 ANXIETY: ICD-10-CM

## 2025-04-08 PROCEDURE — 99214 OFFICE O/P EST MOD 30 MIN: CPT | Performed by: FAMILY MEDICINE

## 2025-04-08 RX ORDER — ALPRAZOLAM 0.5 MG
0.5 TABLET ORAL 2 TIMES DAILY PRN
Qty: 60 TABLET | Refills: 2 | Status: SHIPPED | OUTPATIENT
Start: 2025-04-08 | End: 2026-04-08

## 2025-04-08 RX ORDER — ESZOPICLONE 3 MG/1
3 TABLET, FILM COATED ORAL DAILY
Qty: 90 TABLET | Refills: 0 | Status: SHIPPED | OUTPATIENT
Start: 2025-04-08 | End: 2026-04-08

## 2025-04-08 RX ORDER — BUPRENORPHINE 15 UG/H
PATCH TRANSDERMAL
COMMUNITY
Start: 2025-03-25

## 2025-04-08 RX ORDER — CYCLOBENZAPRINE HCL 10 MG
TABLET ORAL
COMMUNITY
Start: 2025-03-20

## 2025-04-08 NOTE — PROGRESS NOTES
tablet Take 1 tablet by mouth daily Yes José Miguel Galvan MD   lubiprostone (AMITIZA) 24 MCG capsule Take 1 capsule by mouth 2 times daily Yes José Miguel Galvan MD   omeprazole (PRILOSEC) 40 MG delayed release capsule Take 1 capsule by mouth 2 times daily Yes José Miguel Galvan MD   topiramate (TOPAMAX) 100 MG tablet Take 1 tablet by mouth 2 times daily Yes José Miguel Galvan MD   fluconazole (DIFLUCAN) 150 MG tablet Take 1 tablet by mouth daily Yes José Miguel Galvan MD   HYDROcodone-acetaminophen (NORCO) 7.5-325 MG per tablet Take 1 tablet by mouth every 8 hours as needed for Pain. Max Daily Amount: 3 tablets  Patient taking differently: Take 1 tablet by mouth in the morning and 1 tablet in the evening. Yes José Miguel Galvan MD   Multiple Vitamins-Minerals (MULTIVITAMIN ADULTS) TABS take one tablet bid Yes Aditi Borrego MD   B Complex Vitamins (VITAMIN B COMPLEX) CAPS Take 50 mg by mouth Yes Aditi Borrego MD   Calcium Carbonate-Vit D-Min (CALCIUM 600+D PLUS MINERALS PO) Take 600 mcg by mouth once a week Yes Aditi Borrego MD   Magnesium Citrate (CITROMA PO) take 100mgs BID Yes Aditi Borrego MD        No Known Allergies    No past medical history on file.    No past surgical history on file.    No family history on file.      Review of Systems   Constitutional:  Negative for activity change, appetite change, fatigue and fever.   HENT:  Negative for congestion and rhinorrhea.    Respiratory:  Negative for cough and shortness of breath.    Cardiovascular:  Negative for chest pain and palpitations.   Gastrointestinal:  Negative for abdominal pain, constipation, diarrhea, nausea and vomiting.   Genitourinary:  Negative for dysuria and hematuria.   Musculoskeletal:  Positive for arthralgias and back pain. Negative for gait problem and neck pain.   Skin:  Negative for rash and wound.   Neurological:  Negative for syncope and weakness.   Hematological:  Negative for adenopathy. Does not bruise/bleed easily.

## 2025-04-09 DIAGNOSIS — F41.9 ANXIETY: ICD-10-CM

## 2025-04-09 RX ORDER — ALPRAZOLAM 0.5 MG
0.5 TABLET ORAL 2 TIMES DAILY PRN
Qty: 60 TABLET | Refills: 2 | OUTPATIENT
Start: 2025-04-09 | End: 2026-04-09

## 2025-04-24 DIAGNOSIS — E66.9 OBESITY, UNSPECIFIED CLASS, UNSPECIFIED OBESITY TYPE, UNSPECIFIED WHETHER SERIOUS COMORBIDITY PRESENT: Primary | ICD-10-CM

## 2025-05-13 ENCOUNTER — HOSPITAL ENCOUNTER (OUTPATIENT)
Dept: MRI IMAGING | Facility: HOSPITAL | Age: 45
Discharge: HOME | End: 2025-05-13
Payer: OTHER GOVERNMENT

## 2025-05-13 DIAGNOSIS — M54.16 LUMBAR RADICULOPATHY: ICD-10-CM

## 2025-05-13 PROCEDURE — 72148 MRI LUMBAR SPINE W/O DYE: CPT

## 2025-05-23 DIAGNOSIS — B37.9 YEAST INFECTION: ICD-10-CM

## 2025-05-23 DIAGNOSIS — K21.9 GASTROESOPHAGEAL REFLUX DISEASE, UNSPECIFIED WHETHER ESOPHAGITIS PRESENT: ICD-10-CM

## 2025-05-23 RX ORDER — OMEPRAZOLE 40 MG/1
40 CAPSULE, DELAYED RELEASE ORAL 2 TIMES DAILY
Qty: 180 CAPSULE | Refills: 3 | Status: SHIPPED | OUTPATIENT
Start: 2025-05-23

## 2025-05-23 RX ORDER — FLUCONAZOLE 150 MG/1
150 TABLET ORAL DAILY
Qty: 30 TABLET | Refills: 11 | Status: SHIPPED | OUTPATIENT
Start: 2025-05-23

## 2025-06-03 DIAGNOSIS — K58.1 IRRITABLE BOWEL SYNDROME WITH CONSTIPATION: ICD-10-CM

## 2025-06-03 RX ORDER — LUBIPROSTONE 24 UG/1
24 CAPSULE ORAL 2 TIMES DAILY
Qty: 180 CAPSULE | Refills: 1 | Status: SHIPPED | OUTPATIENT
Start: 2025-06-03 | End: 2025-06-04 | Stop reason: SDUPTHER

## 2025-06-04 DIAGNOSIS — K58.1 IRRITABLE BOWEL SYNDROME WITH CONSTIPATION: ICD-10-CM

## 2025-06-04 RX ORDER — LUBIPROSTONE 24 UG/1
24 CAPSULE ORAL 2 TIMES DAILY
Qty: 180 CAPSULE | Refills: 1 | Status: SHIPPED | OUTPATIENT
Start: 2025-06-04

## 2025-06-25 ENCOUNTER — OFFICE VISIT (OUTPATIENT)
Age: 45
End: 2025-06-25
Payer: OTHER GOVERNMENT

## 2025-06-25 VITALS
BODY MASS INDEX: 27.61 KG/M2 | TEMPERATURE: 97.4 F | HEIGHT: 68 IN | WEIGHT: 182.2 LBS | SYSTOLIC BLOOD PRESSURE: 110 MMHG | HEART RATE: 97 BPM | DIASTOLIC BLOOD PRESSURE: 80 MMHG | RESPIRATION RATE: 16 BRPM | OXYGEN SATURATION: 98 %

## 2025-06-25 DIAGNOSIS — M54.50 CHRONIC LOW BACK PAIN, UNSPECIFIED BACK PAIN LATERALITY, UNSPECIFIED WHETHER SCIATICA PRESENT: Primary | ICD-10-CM

## 2025-06-25 DIAGNOSIS — Z12.11 SCREENING FOR MALIGNANT NEOPLASM OF COLON: ICD-10-CM

## 2025-06-25 DIAGNOSIS — K58.1 IRRITABLE BOWEL SYNDROME WITH CONSTIPATION: ICD-10-CM

## 2025-06-25 DIAGNOSIS — F41.9 ANXIETY: ICD-10-CM

## 2025-06-25 DIAGNOSIS — Z12.39 SCREENING BREAST EXAMINATION: ICD-10-CM

## 2025-06-25 DIAGNOSIS — G47.00 INSOMNIA, UNSPECIFIED TYPE: ICD-10-CM

## 2025-06-25 DIAGNOSIS — Z71.89 COUNSELING FOR HORMONE REPLACEMENT THERAPY: ICD-10-CM

## 2025-06-25 DIAGNOSIS — G89.29 CHRONIC LOW BACK PAIN, UNSPECIFIED BACK PAIN LATERALITY, UNSPECIFIED WHETHER SCIATICA PRESENT: Primary | ICD-10-CM

## 2025-06-25 DIAGNOSIS — M79.7 FIBROMYALGIA: ICD-10-CM

## 2025-06-25 PROCEDURE — 99214 OFFICE O/P EST MOD 30 MIN: CPT | Performed by: FAMILY MEDICINE

## 2025-06-25 RX ORDER — ALPRAZOLAM 0.5 MG
0.5 TABLET ORAL 2 TIMES DAILY PRN
Qty: 60 TABLET | Refills: 2 | Status: SHIPPED | OUTPATIENT
Start: 2025-06-25 | End: 2026-06-25

## 2025-06-25 RX ORDER — ESZOPICLONE 3 MG/1
3 TABLET, FILM COATED ORAL DAILY
Qty: 90 TABLET | Refills: 0 | OUTPATIENT
Start: 2025-06-25

## 2025-06-25 RX ORDER — NORTRIPTYLINE HYDROCHLORIDE 25 MG/1
25 CAPSULE ORAL NIGHTLY
Qty: 90 CAPSULE | Refills: 1 | Status: SHIPPED | OUTPATIENT
Start: 2025-06-25

## 2025-06-25 RX ORDER — ESZOPICLONE 3 MG/1
3 TABLET, FILM COATED ORAL DAILY
Qty: 90 TABLET | Refills: 0 | Status: SHIPPED | OUTPATIENT
Start: 2025-06-25 | End: 2026-06-25

## 2025-06-25 RX ORDER — BUPROPION HYDROCHLORIDE 300 MG/1
300 TABLET ORAL EVERY MORNING
Qty: 90 TABLET | Refills: 1 | Status: SHIPPED | OUTPATIENT
Start: 2025-06-25

## 2025-06-25 RX ORDER — TOPIRAMATE 100 MG/1
100 TABLET, FILM COATED ORAL 2 TIMES DAILY
Qty: 180 TABLET | Refills: 1 | Status: SHIPPED | OUTPATIENT
Start: 2025-06-25

## 2025-06-25 ASSESSMENT — ENCOUNTER SYMPTOMS
ABDOMINAL PAIN: 0
VOMITING: 0
COUGH: 0
RHINORRHEA: 0
NAUSEA: 0
DIARRHEA: 0
BACK PAIN: 1
CONSTIPATION: 0
SHORTNESS OF BREATH: 0

## 2025-06-25 NOTE — PROGRESS NOTES
Lashawn Wiggins (:  1980) is a 45 y.o. female, Established patient, here for evaluation of the following chief complaint(s):  Anxiety (Patient states med works well), Back Pain (States med works well), and Insomnia (States med works well)         Assessment & Plan  1. Medication Management: Stable.  - Continues Lunesta and Xanax, advised to take Xanax only as needed and skip prescriptions if excess accumulates.  - Discussed potential side effects of Zepbound, cautioned about its use given non-obese status.  - New referral for pain management placed today.  - Medication sent to pharmacy.    2. Health Maintenance.  - Mammogram due in 10/2025.  - Referrals to OB/GYN for hormone replacement therapy counseling and GI doctor for colon cancer screening.    3. Irritable bowel syndrome: Chronic.  - Nortriptyline prescribed for IBS.  - Regular GI checkup required, referral to GI doctor for further management.    4. Weight management.  - Reports weight gain and difficulty losing weight despite dietary efforts.  - Starting Zepbound to assist with weight management.    Follow-up  - Mammogram due in 10/2025.  - Referrals to OB/GYN for hormone replacement therapy counseling and GI doctor for colon cancer screening.    Results    1. Chronic low back pain, unspecified back pain laterality, unspecified whether sciatica present  -     External Referral To Pain Clinic  2. Insomnia, unspecified type  -     eszopiclone (LUNESTA) 3 MG TABS; Take 1 tablet by mouth daily. Max Daily Amount: 3 mg, Disp-90 tablet, R-0Normal  3. Anxiety  -     ALPRAZolam (XANAX) 0.5 MG tablet; Take 1 tablet by mouth 2 times daily as needed for Anxiety. Max Daily Amount: 1 mg, Disp-60 tablet, R-2Normal  -     buPROPion (WELLBUTRIN XL) 300 MG extended release tablet; Take 1 tablet by mouth every morning, Disp-90 tablet, R-1Normal  4. Fibromyalgia  -     topiramate (TOPAMAX) 100 MG tablet; Take 1 tablet by mouth 2 times daily, Disp-180 tablet,

## 2025-06-30 ENCOUNTER — TELEPHONE (OUTPATIENT)
Age: 45
End: 2025-06-30

## 2025-06-30 RX ORDER — ESTRADIOL 1 MG/1
1 TABLET ORAL DAILY
Qty: 90 TABLET | Refills: 3 | Status: SHIPPED | OUTPATIENT
Start: 2025-06-30

## 2025-06-30 NOTE — TELEPHONE ENCOUNTER
Lashawn GUILLERMO Wiggins called to request a refill on her medication.      Last office visit : 6/25/2025   Next office visit : 9/24/2025     Requested Prescriptions     Pending Prescriptions Disp Refills    estradiol (ESTRACE) 1 MG tablet [Pharmacy Med Name: ESTRADIOL TABS 1MG] 90 tablet 3     Sig: TAKE 1 TABLET DAILY            Rachael Patterson RN

## 2025-08-11 ENCOUNTER — OFFICE VISIT (OUTPATIENT)
Dept: OBGYN CLINIC | Age: 45
End: 2025-08-11
Payer: OTHER GOVERNMENT

## 2025-08-11 VITALS
WEIGHT: 172 LBS | HEART RATE: 77 BPM | DIASTOLIC BLOOD PRESSURE: 79 MMHG | HEIGHT: 68 IN | BODY MASS INDEX: 26.07 KG/M2 | SYSTOLIC BLOOD PRESSURE: 113 MMHG

## 2025-08-11 DIAGNOSIS — N95.1 PERIMENOPAUSAL SYMPTOMS: Primary | ICD-10-CM

## 2025-08-11 PROCEDURE — 99203 OFFICE O/P NEW LOW 30 MIN: CPT | Performed by: OBSTETRICS & GYNECOLOGY

## 2025-08-11 RX ORDER — METRONIDAZOLE 500 MG/1
500 TABLET ORAL 2 TIMES DAILY
Qty: 14 TABLET | Refills: 0 | Status: SHIPPED | OUTPATIENT
Start: 2025-08-11 | End: 2025-08-18

## 2025-08-11 RX ORDER — FERROUS GLUCONATE 324(38)MG
TABLET ORAL
COMMUNITY

## 2025-08-11 RX ORDER — TIRZEPATIDE 5 MG/.5ML
INJECTION, SOLUTION SUBCUTANEOUS
COMMUNITY

## 2025-08-11 RX ORDER — OXYCODONE AND ACETAMINOPHEN 5; 325 MG/1; MG/1
TABLET ORAL
COMMUNITY
Start: 2025-07-25

## 2025-08-11 SDOH — ECONOMIC STABILITY: FOOD INSECURITY: WITHIN THE PAST 12 MONTHS, THE FOOD YOU BOUGHT JUST DIDN'T LAST AND YOU DIDN'T HAVE MONEY TO GET MORE.: NEVER TRUE

## 2025-08-11 SDOH — ECONOMIC STABILITY: FOOD INSECURITY: WITHIN THE PAST 12 MONTHS, YOU WORRIED THAT YOUR FOOD WOULD RUN OUT BEFORE YOU GOT MONEY TO BUY MORE.: NEVER TRUE

## 2025-08-11 ASSESSMENT — PATIENT HEALTH QUESTIONNAIRE - PHQ9
10. IF YOU CHECKED OFF ANY PROBLEMS, HOW DIFFICULT HAVE THESE PROBLEMS MADE IT FOR YOU TO DO YOUR WORK, TAKE CARE OF THINGS AT HOME, OR GET ALONG WITH OTHER PEOPLE: VERY DIFFICULT
8. MOVING OR SPEAKING SO SLOWLY THAT OTHER PEOPLE COULD HAVE NOTICED. OR THE OPPOSITE, BEING SO FIGETY OR RESTLESS THAT YOU HAVE BEEN MOVING AROUND A LOT MORE THAN USUAL: NOT AT ALL
SUM OF ALL RESPONSES TO PHQ QUESTIONS 1-9: 10
3. TROUBLE FALLING OR STAYING ASLEEP: MORE THAN HALF THE DAYS
SUM OF ALL RESPONSES TO PHQ QUESTIONS 1-9: 10
SUM OF ALL RESPONSES TO PHQ QUESTIONS 1-9: 10
9. THOUGHTS THAT YOU WOULD BE BETTER OFF DEAD, OR OF HURTING YOURSELF: NOT AT ALL
7. TROUBLE CONCENTRATING ON THINGS, SUCH AS READING THE NEWSPAPER OR WATCHING TELEVISION: MORE THAN HALF THE DAYS
2. FEELING DOWN, DEPRESSED OR HOPELESS: SEVERAL DAYS
1. LITTLE INTEREST OR PLEASURE IN DOING THINGS: MORE THAN HALF THE DAYS
4. FEELING TIRED OR HAVING LITTLE ENERGY: MORE THAN HALF THE DAYS
5. POOR APPETITE OR OVEREATING: NOT AT ALL
SUM OF ALL RESPONSES TO PHQ QUESTIONS 1-9: 10
6. FEELING BAD ABOUT YOURSELF - OR THAT YOU ARE A FAILURE OR HAVE LET YOURSELF OR YOUR FAMILY DOWN: SEVERAL DAYS

## 2025-08-11 ASSESSMENT — ENCOUNTER SYMPTOMS
DIARRHEA: 1
BACK PAIN: 1
CONSTIPATION: 1
RESPIRATORY NEGATIVE: 1

## 2025-08-11 ASSESSMENT — LIFESTYLE VARIABLES
HOW OFTEN DO YOU HAVE A DRINK CONTAINING ALCOHOL: MONTHLY OR LESS
HOW MANY STANDARD DRINKS CONTAINING ALCOHOL DO YOU HAVE ON A TYPICAL DAY: PATIENT DOES NOT DRINK

## (undated) DEVICE — SHEARS HARMONIC 5CM 36CM

## (undated) DEVICE — ENDOSTITCH INSTRUMENT

## (undated) DEVICE — STAPLER ANVIL DST EEA XL 25MM

## (undated) DEVICE — DRAPE ABDOMINAL TIBURON 14X11

## (undated) DEVICE — ELECTRODE REM PLYHSV RETURN 9

## (undated) DEVICE — SEE MEDLINE ITEM 156902

## (undated) DEVICE — ADHESIVE MASTISOL VIAL 48/BX

## (undated) DEVICE — SEE MEDLINE ITEM 157117

## (undated) DEVICE — STAPLER 3.5MM

## (undated) DEVICE — IRRIGATOR ENDOSCOPY DISP.

## (undated) DEVICE — APPLICATOR CHLORAPREP ORN 26ML

## (undated) DEVICE — STAPLER ECHELON FLEX 60MM 44CM

## (undated) DEVICE — SUT SURGIDAC ENDO STITCH2-0

## (undated) DEVICE — TUBING HF INSUFFLATION W/ FLTR

## (undated) DEVICE — TROCAR ENDOPATH XCEL 12MM 10CM

## (undated) DEVICE — NDL HYPO REG 25G X 1 1/2

## (undated) DEVICE — RELOAD ECHELON FLEX WHT 60MM

## (undated) DEVICE — SYR 10CC LUER LOCK

## (undated) DEVICE — SEE MEDLINE ITEM 152487

## (undated) DEVICE — Device

## (undated) DEVICE — CANNULA ENDOPATH XCEL 5X100MM

## (undated) DEVICE — SOL NS 1000CC

## (undated) DEVICE — RELOAD ECHELON FLEX BLU 60MM

## (undated) DEVICE — TROCAR ENDOPATH XCEL 12X100MM

## (undated) DEVICE — LUBRICANT SURGILUBE 2 OZ

## (undated) DEVICE — SUT MCRYL PLUS 4-0 PS2 27IN

## (undated) DEVICE — ENDOSTITCH POLYSORB 2-0 ES-9

## (undated) DEVICE — STRIP STERI REIN CLSR 1/2X2IN

## (undated) DEVICE — TROCAR ENDOPATH XCEL 5X100MM

## (undated) DEVICE — SUT 0 VICRYL / UR6 (J603)